# Patient Record
Sex: FEMALE | Race: WHITE | NOT HISPANIC OR LATINO | Employment: FULL TIME | URBAN - METROPOLITAN AREA
[De-identification: names, ages, dates, MRNs, and addresses within clinical notes are randomized per-mention and may not be internally consistent; named-entity substitution may affect disease eponyms.]

---

## 2017-01-26 ENCOUNTER — OFFICE VISIT (OUTPATIENT)
Dept: OBSTETRICS AND GYNECOLOGY | Facility: CLINIC | Age: 32
End: 2017-01-26
Attending: OBSTETRICS & GYNECOLOGY
Payer: COMMERCIAL

## 2017-01-26 ENCOUNTER — TELEPHONE (OUTPATIENT)
Dept: OBSTETRICS AND GYNECOLOGY | Facility: CLINIC | Age: 32
End: 2017-01-26

## 2017-01-26 VITALS
WEIGHT: 147.69 LBS | DIASTOLIC BLOOD PRESSURE: 78 MMHG | BODY MASS INDEX: 23.84 KG/M2 | HEART RATE: 73 BPM | SYSTOLIC BLOOD PRESSURE: 120 MMHG

## 2017-01-26 DIAGNOSIS — Z01.419 WELL FEMALE EXAM WITH ROUTINE GYNECOLOGICAL EXAM: Primary | ICD-10-CM

## 2017-01-26 PROBLEM — K52.9 COLITIS: Status: ACTIVE | Noted: 2017-01-26

## 2017-01-26 PROCEDURE — 88175 CYTOPATH C/V AUTO FLUID REDO: CPT

## 2017-01-26 PROCEDURE — 99999 PR PBB SHADOW E&M-EST. PATIENT-LVL III: CPT | Mod: PBBFAC,,, | Performed by: OBSTETRICS & GYNECOLOGY

## 2017-01-26 PROCEDURE — 99385 PREV VISIT NEW AGE 18-39: CPT | Mod: S$GLB,,, | Performed by: OBSTETRICS & GYNECOLOGY

## 2017-01-26 RX ORDER — AMOXICILLIN 500 MG
2 CAPSULE ORAL DAILY
COMMUNITY

## 2017-01-26 RX ORDER — NORETHINDRONE ACETATE AND ETHINYL ESTRADIOL AND FERROUS FUMARATE 1MG-20(24)
1 KIT ORAL DAILY
Qty: 28 TABLET | Refills: 11 | Status: SHIPPED | OUTPATIENT
Start: 2017-01-26 | End: 2018-11-16

## 2017-01-26 RX ORDER — LANOLIN ALCOHOL/MO/W.PET/CERES
100 CREAM (GRAM) TOPICAL DAILY
COMMUNITY

## 2017-01-26 NOTE — PROGRESS NOTES
SUBJECTIVE:   31 y.o. female No obstetric history on file.  for routine gyn exam. Patient's last menstrual period was 01/25/2017 (approximate)..  She has no unusual complaints. Desires OCP's. Discussed colitis and pregnancy issues.          Past Medical History   Diagnosis Date    Crohn disease     Ulcerative colitis, unspecified      Past Surgical History   Procedure Laterality Date    Appendectomy      Foot surgery Right     Ear tube removal Bilateral     [place] tooth extraction       Social History     Social History    Marital status: Single     Spouse name: N/A    Number of children: N/A    Years of education: N/A     Occupational History    Not on file.     Social History Main Topics    Smoking status: Never Smoker    Smokeless tobacco: Not on file    Alcohol use Yes      Comment: socially    Drug use: No    Sexual activity: Yes     Partners: Male     Birth control/ protection: OCP     Other Topics Concern    Not on file     Social History Narrative     History reviewed. No pertinent family history.  OB History   No data available         Current Outpatient Prescriptions   Medication Sig Dispense Refill    cyanocobalamin (VITAMIN B-12) 1000 MCG tablet Take 100 mcg by mouth once daily.      fish oil-omega-3 fatty acids 300-1,000 mg capsule Take 2 g by mouth once daily.      mercaptopurine (PURINETHOL) 50 mg tablet Take 50 mg by mouth once daily.      mesalamine (ASACOL) 400 mg EC tablet Take 400 mg by mouth 3 (three) times daily.      multivitamin capsule Take 1 capsule by mouth once daily.      turmeric, bulk, 95 % Powd by Misc.(Non-Drug; Combo Route) route.      norethindrone-e.estradiol-iron (MINASTRIN 24 FE) 1 mg-20 mcg(24) /75 mg (4) Chew Take 1 tablet by mouth once daily. 28 tablet 11     No current facility-administered medications for this visit.      Allergies: Cimzia [certolizumab pegol]     ROS:  Constitutional: no weight loss, weight gain, fever, fatigue  Eyes:  No vision  changes, glasses/contacts  ENT/Mouth: No ulcers, sinus problems, ears ringing, headache  Cardiovascular: No inability to lie flat, chest pain, exercise intolerance, swelling, heart palpitations  Respiratory: No wheezing, coughing blood, shortness of breath, or cough  Gastrointestinal: No diarrhea, bloody stool, nausea/vomiting, constipation, gas, hemorrhoids  Genitourinary: No blood in urine, painful urination, urgency of urination, frequency of urination, incomplete emptying, incontinence, abnormal bleeding, painful periods, heavy periods, vaginal discharge, vaginal odor, painful intercourse, sexual problems, bleeding after intercourse.  Musculoskeletal: No muscle weakness  Skin/Breast: No painful breasts, nipple discharge, masses, rash, ulcers  Neurological: No passing out, seizures, numbness, headache  Endocrine: No diabetes, hypothyroid, hyperthyroid, hot flashes, hair loss, abnormal hair growth, ance  Psychiatric: No depression, crying  Hematologic: No bruises, bleeding, swollen lymph nodes, anemia.      OBJECTIVE:   The patient appears well, alert, oriented x 3, in no distress.  Visit Vitals    /78 (BP Location: Right arm, Patient Position: Sitting, BP Method: Manual)    Pulse 73    Wt 67 kg (147 lb 11.3 oz)    LMP 01/25/2017 (Approximate)    BMI 23.84 kg/m2     NECK: no thyromegaly, trachea midline  SKIN: no acne, striae, hirsutism  CHEST: CTAB  CV: RRR  BREAST EXAM: breasts appear normal, no suspicious masses, no skin or nipple changes or axillary nodes  ABDOMEN: no hernias, masses, or hepatosplenomegaly  GENITALIA: normal external genitalia, no erythema, no discharge  URETHRA: normal urethra, normal urethral meatus  VAGINA: Normal  CERVIX: no lesions or cervical motion tenderness  UTERUS: normal size, contour, position, consistency, mobility, non-tender  ADNEXA: no mass, fullness, tenderness      ASSESSMENT:   1. Well female exam with routine gynecological exam  Liquid-based pap smear, screening        PLAN:   Orders Placed This Encounter    Liquid-based pap smear, screening    norethindrone-e.estradiol-iron (MINASTRIN 24 FE) 1 mg-20 mcg(24) /75 mg (4) Chew     Return to clinic in 1 year

## 2017-01-26 NOTE — MR AVS SNAPSHOT
[facility] - [facility]N [address]  [address] [address]  Phone: [phone]                  Alisa Herrera   2017 3:00 PM   Office Visit    Description:  Female : 1985   Provider:  Juanis Mccarthy MD   Department:  [facility] - OB[facility]N [address]           Reason for Visit     Well Woman                To Do List           Goals (5 Years of Data)     None      [facility]     [facility] Nurse Care Line -  Assistance  Registered nurses in the [facility] provide clinical advisement, health education, appointment booking, and other advisory services.  Call for this free service at [phone].             Medications           Message regarding Medications     Verify the changes and/or additions to your medication regime listed below are the same as discussed with your clinician today.  If any of these changes or additions are incorrect, please notify your healthcare provider.             Verify that the below list of medications is an accurate representation of the medications you are currently taking.  If none reported, the list may be blank. If incorrect, please contact your healthcare provider. Carry this list with you in case of emergency.           Current Medications     fish oil-omega-3 fatty acids 300-1,000 mg capsule Take 2 g by mouth once daily.    mercaptopurine (PURINETHOL) 50 mg tablet Take 50 mg by mouth once daily.    mesalamine (ASACOL) 400 mg EC tablet Take 400 mg by mouth 3 (three) times daily.    multivitamin capsule Take 1 capsule by mouth once daily.    turmeric, bulk, 95 % Powd by Misc.(Non-Drug; Combo Route) route.           Clinical Reference Information           Vital Signs - Last Recorded  Most recent update: 2017  3:33 PM by Liz Franz MA    BP Pulse Wt LMP BMI    120/78 (BP Location: Right arm, Patient Position: Sitting, BP Method: Manual) 73 67 kg (147 lb 11.3 oz) 2017 (Approximate) 23.84 kg/m2       Blood Pressure          Most Recent Value    BP  120/78      Allergies as of 1/26/2017     Cimzia [Certolizumab Pegol]      Immunizations Administered on Date of Encounter - 1/26/2017     None      [facility] Sign-Up     Activating your [facility] account is as easy as 1-2-3!     1) Visit [email or web address], select Sign Up Now, enter this activation code and your date of birth, then select Next.  [email or web address]  Expires: 3/12/2017  3:34 PM      2) Create a username and password to use when you visit [facility] in the future and select a security question in case you lose your password and select Next.    3) Enter your e-mail address and click Sign Up!    Additional Information  If you have questions, please e-mail [email or web address] or call [phone] to talk to our [facility] staff. Remember, [facility] is NOT to be used for urgent needs. For medical emergencies, dial 911.

## 2017-01-26 NOTE — LETTER
January 26, 2017      Abbey Fuentes MD  [address]  [address]  [place] [address]           [facility] [address]  [address] [address]  Phone: [phone]          Patient: Alisa Herrera   MR Number: [id number]   YOB: 1985   Date of Visit: 1/26/2017       Dear Dr. Abbey Fuentes:    Thank you for referring Alisa Herrera to me for evaluation. Attached you will find relevant portions of my assessment and plan of care.    If you have questions, please do not hesitate to call me. I look forward to following Alisa Herrera along with you.    Sincerely,    Juanis Mccarthy MD    Enclosure  CC:  No Recipients    If you would like to receive this communication electronically, please contact [email or web address] or [phone] to request more information on [employer] access.    For providers and/or their staff who would like to refer a patient to [facility], please contact us through our one-stop-shop provider referral line, [facility], at [phone].    If you feel you have received this communication in error or would no longer like to receive these types of communications, please e-mail [email or web address]

## 2017-01-26 NOTE — TELEPHONE ENCOUNTER
----- Message from Kalli Albright sent at 1/26/2017  8:10 AM CST -----  Contact: RAFA MESSINA [[id number]]  x_  1st Request  _  2nd Request  _  3rd Request        Who: RAFA MESSINA [[id number]]    Why: Pt states she has started her period and would like to know if she can still be seen today for her annual. Patient states to please leave a voicemail because she may not be able to answer her phone.    What Number to Call Back: [phone]    When to Expect a call back: (Before the end of the day)   -- if call after 3:00 call back will be tomorrow.

## 2017-12-11 ENCOUNTER — OFFICE VISIT (OUTPATIENT)
Dept: OBSTETRICS AND GYNECOLOGY | Facility: CLINIC | Age: 32
End: 2017-12-11
Payer: COMMERCIAL

## 2017-12-11 VITALS
WEIGHT: 148.38 LBS | DIASTOLIC BLOOD PRESSURE: 68 MMHG | HEIGHT: 66 IN | SYSTOLIC BLOOD PRESSURE: 112 MMHG | BODY MASS INDEX: 23.84 KG/M2

## 2017-12-11 DIAGNOSIS — Z31.69 ENCOUNTER FOR PRECONCEPTION CONSULTATION: ICD-10-CM

## 2017-12-11 DIAGNOSIS — K51.00 ULCERATIVE PANCOLITIS WITHOUT COMPLICATION: Primary | ICD-10-CM

## 2017-12-11 PROCEDURE — 99213 OFFICE O/P EST LOW 20 MIN: CPT | Mod: S$GLB,,, | Performed by: OBSTETRICS & GYNECOLOGY

## 2017-12-11 PROCEDURE — 99999 PR PBB SHADOW E&M-EST. PATIENT-LVL III: CPT | Mod: PBBFAC,,, | Performed by: OBSTETRICS & GYNECOLOGY

## 2017-12-11 RX ORDER — MESALAMINE 800 MG/1
1600 TABLET, DELAYED RELEASE ORAL 2 TIMES DAILY
Qty: 360 TABLET | Refills: 3
Start: 2017-12-11 | End: 2018-12-18

## 2017-12-11 NOTE — PROGRESS NOTES
HPI: 32 year old female presents today for preconception counseling. She has hx of ulcerative colitis which has been well controlled for the past 3 years. Is on mesalamine. Was on mercaptopurine which was stopped by her GI doctor in [place] as she will be attempting pregnancy (Cat D). She wants to see how she does with just the mesalamine alone. Pt plans to stop OCP in February 2018 if UC is still well controlled on mesalamine alone.    Pt is a dietician.      ROS:  GENERAL: Feeling well overall. Denies fever or chills.   SKIN: Denies rash or lesions.   HEAD: Denies head injury or headache.   NODES: Denies enlarged lymph nodes.   CHEST: Denies chest pain or shortness of breath.   CARDIOVASCULAR: Denies palpitations or left sided chest pain.   ABDOMEN: No abdominal pain, constipation, diarrhea, nausea, vomiting or rectal bleeding.   URINARY: No dysuria, hematuria, or burning on urination.  REPRODUCTIVE: See HPI.   BREASTS: Denies pain, lumps, or nipple discharge.   HEMATOLOGIC: No easy bruisability or excessive bleeding.   MUSCULOSKELETAL: Denies joint pain or swelling.   NEUROLOGIC: Denies syncope or weakness.   PSYCHIATRIC: Denies depression, anxiety or mood swings.    PE:   APPEARANCE: Well nourished, well developed, White female in no acute distress.  PELVIC: deferred.     Diagnosis:  1. Ulcerative pancolitis without complication    2. Encounter for preconception consultation        Plan:     Orders Placed This Encounter    mesalamine (ASACOL HD) 800 mg TbEC     - Increase folic acid to 4 mg daily  - continue with current adult multi vitamin      Follow-up with me when (+) UPT

## 2018-03-06 ENCOUNTER — CLINICAL SUPPORT (OUTPATIENT)
Dept: OCCUPATIONAL MEDICINE | Facility: CLINIC | Age: 33
End: 2018-03-06

## 2018-03-06 DIAGNOSIS — Z11.1 PPD SCREENING TEST: Primary | ICD-10-CM

## 2018-03-06 PROCEDURE — 86580 TB INTRADERMAL TEST: CPT | Mod: S$GLB,,,

## 2018-03-08 LAB
TB INDURATION - 48 HR READ: 0 MM
TB INDURATION - 72 HR READ: NORMAL MM
TB SKIN TEST - 48 HR READ: NEGATIVE
TB SKIN TEST - 72 HR READ: NORMAL

## 2018-03-12 RX ORDER — NORETHINDRONE ACETATE, ETHINYL ESTRADIOL AND FERROUS FUMARATE 1MG-20(24)
KIT ORAL
Qty: 28 TABLET | Refills: 0 | OUTPATIENT
Start: 2018-03-12

## 2018-07-23 ENCOUNTER — HOSPITAL ENCOUNTER (OUTPATIENT)
Dept: RADIOLOGY | Facility: OTHER | Age: 33
Discharge: HOME | End: 2018-07-23
Attending: OBSTETRICS & GYNECOLOGY
Payer: COMMERCIAL

## 2018-07-23 ENCOUNTER — NURSE TRIAGE (OUTPATIENT)
Dept: ADMINISTRATIVE | Facility: CLINIC | Age: 33
End: 2018-07-23

## 2018-07-23 ENCOUNTER — TELEPHONE (OUTPATIENT)
Dept: OBSTETRICS AND GYNECOLOGY | Facility: CLINIC | Age: 33
End: 2018-07-23

## 2018-07-23 ENCOUNTER — HOSPITAL ENCOUNTER (EMERGENCY)
Facility: OTHER | Age: 33
Discharge: HOME | End: 2018-07-23
Attending: EMERGENCY MEDICINE
Payer: COMMERCIAL

## 2018-07-23 VITALS
TEMPERATURE: 98 F | OXYGEN SATURATION: 98 % | DIASTOLIC BLOOD PRESSURE: 64 MMHG | WEIGHT: 150 LBS | SYSTOLIC BLOOD PRESSURE: 118 MMHG | HEIGHT: 66 IN | HEART RATE: 84 BPM | RESPIRATION RATE: 18 BRPM | BODY MASS INDEX: 24.11 KG/M2

## 2018-07-23 DIAGNOSIS — R10.2 PELVIC PAIN: Primary | ICD-10-CM

## 2018-07-23 DIAGNOSIS — R10.2 PELVIC PAIN: ICD-10-CM

## 2018-07-23 DIAGNOSIS — K57.32 SIGMOID DIVERTICULITIS: Primary | ICD-10-CM

## 2018-07-23 LAB
ALBUMIN SERPL BCP-MCNC: 3.9 G/DL
ALP SERPL-CCNC: 79 U/L
ALT SERPL W/O P-5'-P-CCNC: 18 U/L
ANION GAP SERPL CALC-SCNC: 10 MMOL/L
AST SERPL-CCNC: 19 U/L
B-HCG UR QL: NEGATIVE
BASOPHILS # BLD AUTO: 0.01 K/UL
BASOPHILS NFR BLD: 0.1 %
BILIRUB SERPL-MCNC: 2.1 MG/DL
BILIRUB UR QL STRIP: NEGATIVE
BUN SERPL-MCNC: 6 MG/DL
CALCIUM SERPL-MCNC: 10.1 MG/DL
CHLORIDE SERPL-SCNC: 102 MMOL/L
CLARITY UR: CLEAR
CO2 SERPL-SCNC: 25 MMOL/L
COLOR UR: YELLOW
CREAT SERPL-MCNC: 0.8 MG/DL
CTP QC/QA: YES
DIFFERENTIAL METHOD: ABNORMAL
EOSINOPHIL # BLD AUTO: 0 K/UL
EOSINOPHIL NFR BLD: 0.1 %
ERYTHROCYTE [DISTWIDTH] IN BLOOD BY AUTOMATED COUNT: 12.6 %
EST. GFR  (AFRICAN AMERICAN): >60 ML/MIN/1.73 M^2
EST. GFR  (NON AFRICAN AMERICAN): >60 ML/MIN/1.73 M^2
GLUCOSE SERPL-MCNC: 107 MG/DL
GLUCOSE UR QL STRIP: NEGATIVE
HCT VFR BLD AUTO: 38.2 %
HGB BLD-MCNC: 12.9 G/DL
HGB UR QL STRIP: NEGATIVE
KETONES UR QL STRIP: NEGATIVE
LEUKOCYTE ESTERASE UR QL STRIP: NEGATIVE
LIPASE SERPL-CCNC: 11 U/L
LYMPHOCYTES # BLD AUTO: 1.5 K/UL
LYMPHOCYTES NFR BLD: 9.9 %
MCH RBC QN AUTO: 32 PG
MCHC RBC AUTO-ENTMCNC: 33.8 G/DL
MCV RBC AUTO: 95 FL
MONOCYTES # BLD AUTO: 1 K/UL
MONOCYTES NFR BLD: 6.6 %
NEUTROPHILS # BLD AUTO: 12.6 K/UL
NEUTROPHILS NFR BLD: 82.9 %
NITRITE UR QL STRIP: NEGATIVE
PH UR STRIP: 6 [PH] (ref 5–8)
PLATELET # BLD AUTO: 300 K/UL
PMV BLD AUTO: 9.6 FL
POTASSIUM SERPL-SCNC: 4 MMOL/L
PROT SERPL-MCNC: 8.8 G/DL
PROT UR QL STRIP: NEGATIVE
RBC # BLD AUTO: 4.03 M/UL
SODIUM SERPL-SCNC: 137 MMOL/L
SP GR UR STRIP: 1.01 (ref 1–1.03)
URN SPEC COLLECT METH UR: NORMAL
UROBILINOGEN UR STRIP-ACNC: NEGATIVE EU/DL
WBC # BLD AUTO: 15.17 K/UL

## 2018-07-23 PROCEDURE — 96375 TX/PRO/DX INJ NEW DRUG ADDON: CPT

## 2018-07-23 PROCEDURE — 76830 TRANSVAGINAL US NON-OB: CPT | Mod: 26,,, | Performed by: RADIOLOGY

## 2018-07-23 PROCEDURE — 96361 HYDRATE IV INFUSION ADD-ON: CPT

## 2018-07-23 PROCEDURE — 76856 US EXAM PELVIC COMPLETE: CPT | Mod: 26,,, | Performed by: RADIOLOGY

## 2018-07-23 PROCEDURE — 96374 THER/PROPH/DIAG INJ IV PUSH: CPT

## 2018-07-23 PROCEDURE — 76830 TRANSVAGINAL US NON-OB: CPT | Mod: TC

## 2018-07-23 PROCEDURE — 63600175 PHARM REV CODE 636 W HCPCS: Performed by: PHYSICIAN ASSISTANT

## 2018-07-23 PROCEDURE — 81025 URINE PREGNANCY TEST: CPT | Performed by: EMERGENCY MEDICINE

## 2018-07-23 PROCEDURE — 83690 ASSAY OF LIPASE: CPT

## 2018-07-23 PROCEDURE — 85025 COMPLETE CBC W/AUTO DIFF WBC: CPT

## 2018-07-23 PROCEDURE — 25000003 PHARM REV CODE 250: Performed by: PHYSICIAN ASSISTANT

## 2018-07-23 PROCEDURE — 80053 COMPREHEN METABOLIC PANEL: CPT

## 2018-07-23 PROCEDURE — 81003 URINALYSIS AUTO W/O SCOPE: CPT

## 2018-07-23 PROCEDURE — 25500020 PHARM REV CODE 255: Performed by: EMERGENCY MEDICINE

## 2018-07-23 PROCEDURE — 99284 EMERGENCY DEPT VISIT MOD MDM: CPT | Mod: 25

## 2018-07-23 RX ORDER — ONDANSETRON 4 MG/1
4 TABLET, ORALLY DISINTEGRATING ORAL EVERY 6 HOURS PRN
Qty: 20 TABLET | Refills: 0 | Status: SHIPPED | OUTPATIENT
Start: 2018-07-23 | End: 2022-04-08

## 2018-07-23 RX ORDER — METRONIDAZOLE 500 MG/1
500 TABLET ORAL
Status: COMPLETED | OUTPATIENT
Start: 2018-07-23 | End: 2018-07-23

## 2018-07-23 RX ORDER — METRONIDAZOLE 500 MG/1
500 TABLET ORAL 2 TIMES DAILY
Qty: 20 TABLET | Refills: 0 | Status: SHIPPED | OUTPATIENT
Start: 2018-07-23 | End: 2018-08-02

## 2018-07-23 RX ORDER — ONDANSETRON 2 MG/ML
4 INJECTION INTRAMUSCULAR; INTRAVENOUS
Status: COMPLETED | OUTPATIENT
Start: 2018-07-23 | End: 2018-07-23

## 2018-07-23 RX ORDER — CIPROFLOXACIN 500 MG/1
500 TABLET ORAL 2 TIMES DAILY
Qty: 20 TABLET | Refills: 0 | Status: SHIPPED | OUTPATIENT
Start: 2018-07-23 | End: 2018-08-02

## 2018-07-23 RX ORDER — MORPHINE SULFATE 10 MG/ML
4 INJECTION INTRAMUSCULAR; INTRAVENOUS; SUBCUTANEOUS
Status: COMPLETED | OUTPATIENT
Start: 2018-07-23 | End: 2018-07-23

## 2018-07-23 RX ORDER — HYDROCODONE BITARTRATE AND ACETAMINOPHEN 5; 325 MG/1; MG/1
1 TABLET ORAL EVERY 6 HOURS PRN
Qty: 12 TABLET | Refills: 0 | Status: SHIPPED | OUTPATIENT
Start: 2018-07-23 | End: 2018-08-02

## 2018-07-23 RX ORDER — CIPROFLOXACIN 500 MG/1
500 TABLET ORAL
Status: COMPLETED | OUTPATIENT
Start: 2018-07-23 | End: 2018-07-23

## 2018-07-23 RX ADMIN — ONDANSETRON HYDROCHLORIDE 4 MG: 2 INJECTION, SOLUTION INTRAMUSCULAR; INTRAVENOUS at 07:07

## 2018-07-23 RX ADMIN — MORPHINE SULFATE 4 MG: 10 INJECTION INTRAVENOUS at 07:07

## 2018-07-23 RX ADMIN — IOHEXOL 75 ML: 350 INJECTION, SOLUTION INTRAVENOUS at 08:07

## 2018-07-23 RX ADMIN — CIPROFLOXACIN HYDROCHLORIDE 500 MG: 500 TABLET, FILM COATED ORAL at 09:07

## 2018-07-23 RX ADMIN — SODIUM CHLORIDE 1000 ML: 0.9 INJECTION, SOLUTION INTRAVENOUS at 07:07

## 2018-07-23 RX ADMIN — METRONIDAZOLE 500 MG: 500 TABLET ORAL at 09:07

## 2018-07-23 NOTE — TELEPHONE ENCOUNTER
----- Message from Shari Ibarra sent at 7/23/2018  9:12 AM CDT -----  Contact: pt            Name of Who is Calling: RAFA GERARD [[id number]]      What is the request in detail:pt has been experiencing cramping, gas and pelvic pain.. Pt is not sure if its GI related.. Pt cycle was suppose to start this pass Friday.. Please advise      Can the clinic reply by [facility]: no      What Number to Call Back if not in [facility]: [phone]

## 2018-07-23 NOTE — TELEPHONE ENCOUNTER
----- Message from Robel Reno sent at 7/23/2018  4:06 PM CDT -----  Contact: self  Pt wanting to know results of her US. She can be reached at [phone]

## 2018-07-23 NOTE — TELEPHONE ENCOUNTER
"  Reason for Disposition   General information question, no triage required and triager able to answer question    Answer Assessment - Initial Assessment Questions  1. REASON FOR CALL or QUESTION: "What is your reason for calling today?" or "How can I best help you?" or "What question do you have that I can help answer?"      Wants to know what portion of an abdominal CT scan would be covered by her insurance if she came in to the ER.  She has an appt with her pcp  In the am, and is sure she will order a CT, and she thinks she'd just rather come in to the ED because she is having significant abdominal pain.    Protocols used: ST INFORMATION ONLY CALL-A-AH    "

## 2018-07-24 NOTE — ED TRIAGE NOTES
"+ sharp pelvic pain since Saturday that radiates up to the abdomen. Pt states that she " had an ultra sound today with her OBGYN and they didn't see anything on their end so I came here because I have to get to the bottom of it". Pt c/o nausea and chills but denies fever, dysuria, sob, cp  "

## 2018-07-24 NOTE — ED PROVIDER NOTES
"Encounter Date: 7/23/2018       History     Chief Complaint   Patient presents with    Abdominal Pain     "I've been having really bad abd pain and bloating since Saturday night. I was supposed to start my cycle Thursday but it did not." reports negative home UPT and US done here today; h/o Chron's and colitis     33-year-old female with history of Crohn's/colitis presents to the emergency department with complaints of abdominal pain and bloating.  She states it initially started on Saturday however worsened.  She reports that she is having abdominal cramps that is more to the lower part of her abdomen.  She reports that she had a soft bowel movement yesterday which she reports is her baseline.  She reports nausea but denies vomiting.  She denies constipation.  She denies any abnormal vaginal discharge. She does report that she was supposed to start her menstrual cycle on Thursday however did not.  She reports associated chills but denies fever.  She is treated the pain with both ibuprofen and Tylenol without significant improvement.  She did report temporary relief.  She states the pain is 7/10.      The history is provided by the patient.     Review of patient's allergies indicates:   Allergen Reactions    Cimzia [certolizumab pegol]      Lupus like reactions     Past Medical History:   Diagnosis Date    Crohn disease     Ulcerative colitis, unspecified      Past Surgical History:   Procedure Laterality Date    APPENDECTOMY      EAR TUBE REMOVAL Bilateral     FOOT SURGERY Right     WISDOM TOOTH EXTRACTION       History reviewed. No pertinent family history.  Social History   Substance Use Topics    Smoking status: Never Smoker    Smokeless tobacco: Not on file    Alcohol use Yes      Comment: socially     Review of Systems   Constitutional: Negative for chills and fever.   HENT: Negative for sore throat.    Respiratory: Negative for shortness of breath.    Cardiovascular: Negative for chest pain. "   Gastrointestinal: Positive for abdominal pain and nausea. Negative for anal bleeding, constipation, diarrhea and vomiting.   Genitourinary: Negative for difficulty urinating, dysuria, flank pain, frequency, hematuria, urgency, vaginal bleeding and vaginal discharge.   Musculoskeletal: Negative for back pain.   Skin: Negative for rash.   Neurological: Negative for weakness.   Hematological: Does not bruise/bleed easily.       Physical Exam     Initial Vitals [07/23/18 1832]   BP Pulse Resp Temp SpO2   119/72 107 16 98.3 °F (36.8 °C) 99 %      MAP       --         Physical Exam    Nursing note and vitals reviewed.  Constitutional: Vital signs are normal. She appears well-developed and well-nourished. She is not diaphoretic.  Non-toxic appearance. No distress.   HENT:   Head: Normocephalic and atraumatic.   Right Ear: External ear normal.   Left Ear: External ear normal.   Nose: Nose normal.   Mouth/Throat: Oropharynx is clear and moist.   Eyes: Conjunctivae, EOM and lids are normal. Pupils are equal, round, and reactive to light. No scleral icterus.   Neck: Normal range of motion and phonation normal. Neck supple.   Cardiovascular: Normal rate, regular rhythm and normal heart sounds. Exam reveals no gallop and no friction rub.    No murmur heard.  Pulmonary/Chest: No respiratory distress. She has no wheezes. She has no rhonchi. She has no rales.   Abdominal: Soft. Normal appearance and bowel sounds are normal. She exhibits no distension. There is tenderness in the suprapubic area and left lower quadrant. There is no rigidity, no rebound, no guarding, no CVA tenderness, no tenderness at McBurney's point and negative Dee's sign.   Musculoskeletal: Normal range of motion.   No obvious deformities, moving all extremities, normal gait   Neurological: She is alert and oriented to person, place, and time. She has normal strength. No sensory deficit.   Skin: Skin is warm, dry and intact. No lesion and no rash noted. No  erythema.   Psychiatric: She has a normal mood and affect. Her speech is normal and behavior is normal. Judgment normal. Cognition and memory are normal.         ED Course   Procedures  Labs Reviewed   CBC W/ AUTO DIFFERENTIAL - Abnormal; Notable for the following:        Result Value    WBC 15.17 (*)     MCH 32.0 (*)     Gran # (ANC) 12.6 (*)     Gran% 82.9 (*)     Lymph% 9.9 (*)     All other components within normal limits   COMPREHENSIVE METABOLIC PANEL - Abnormal; Notable for the following:     Total Protein 8.8 (*)     Total Bilirubin 2.1 (*)     All other components within normal limits   URINALYSIS, REFLEX TO URINE CULTURE    Narrative:     Preferred Collection Type->Urine, Clean Catch   LIPASE   POCT URINE PREGNANCY          Imaging Results          CT Abdomen Pelvis With Contrast (Final result)  Result time 07/23/18 20:47:05    Final result by Lito Ambriz MD (07/23/18 20:47:05)                 Impression:      Acute sigmoid diverticulitis.  No abscess.    Prior appendectomy.    Layering sludge in the gallbladder.    Small bilateral adnexal cyst.  Minimal free fluid in the cul de sac.      Electronically signed by: Lito Ambriz MD  Date:    07/23/2018  Time:    20:47             Narrative:    EXAMINATION:  CT ABDOMEN PELVIS WITH CONTRAST    CLINICAL HISTORY:  Abdominal pain, unspecified;    TECHNIQUE:  Low dose axial images, sagittal and coronal reformations were obtained from the lung bases to the pubic symphysis following the IV administration of 75 mL of Omnipaque 350 .  Oral contrast was not administered.    COMPARISON:  July 9, 2014.    FINDINGS:  ABDOMEN:    - Lung bases: No infiltrates and no nodules.    - Liver: No focal mass.    - Gallbladder: Layering sludge.    - Bile Ducts: No evidence of intra or extra hepatic biliary ductal dilation.    - Spleen: Negative.    - Kidneys: No mass or hydronephrosis.    - Adrenals: Unremarkable.    - Pancreas: No mass or peripancreatic fat  stranding.    - Retroperitoneum:  No significant adenopathy.    - Vascular: No abdominal aortic aneurysm.    - Abdominal wall:  Unremarkable.    PELVIS:    No pelvic mass or adenopathy.  Minimal free fluid in the posterior cul-de-sac.  Subcentimeter enhancing follicular cyst left ovary.  2 cm cyst right ovary    BOWEL/MESENTERY:    Fell bowel obstruction.  Sigmoid diverticula with segment of abnormal colon wall thickening and pericolic fat stranding consistent with acute diverticulitis.  No pericolic abscess.  1 cm fecalith  at the cecal base.  Radiopaque suture line at the base of the cecum consistent with history of prior appendectomy.    BONES:  No acute osseous abnormality and no suspicious lytic or blastic lesion.                                 Medical Decision Making:   History:   Old Medical Records: I decided to obtain old medical records.  Initial Assessment:   33-year-old female with complaints consistent with sigmoid diverticulitis.  Afebrile neurovascularly intact.  She is alert and healthy and nontoxic appearing.  Exam documented above.  She is tender in the left lower quadrant suprapubic region.  She appears uncomfortable.  No CVA tenderness palpation.  Patient had ultrasound obtained by gyn with no acute findings to explain her symptoms today.  Clinical Tests:   Lab Tests: Ordered and Reviewed  Radiological Study: Ordered and Reviewed  ED Management:  Urine, labs and CT scan with IV contrast was obtained.  Her [place] blood [place] [address].  H&H stable. No significant electrolyte abnormality.  UPT negative. Urinalysis shows no evidence of serious bacterial infection, UTI or pyelonephritis.  Lipase is not elevated.  A CT scan concerning for sigmoid diverticulitis without abscess formation.  She was administered IV fluids, Zofran, morphine in the emergency department.  She was then given a dose of Cipro and Flagyl.  Given strict return precautions, otherwise follow up with her  gastroenterologist.  She admits that her physician is in bed roof however she did call her today.  Will discharge home with prescription for Cipro, Flagyl, Zofran ODT and Norco.  She is to follow up in the next 48 hr or return for any worsening signs or symptoms. She states understanding agrees with this plan.  This patient was discussed with the attending physician who agrees with treatment plan.  This is the extent of patient's complaints today.  Other:   I have discussed this case with another health care provider.       <> Summary of the Discussion: Jack  This note was created using [employer] dictation.  There may be typographical errors secondary to dictation.                        Clinical Impression:     1. Sigmoid diverticulitis                                   Shea Alvarez PA-C  07/23/18 [id number]

## 2018-07-24 NOTE — ED NOTES
Rounding on the patient is completed. AAOx4.  Patient is sitting in recliner, respirations are even and unlabored. Patient skin is warm and dry. Patient in no acute distress. The patient has been updated on plan of care and current status. Pain was accessed and is currently a 4/10. Pt denies n/v, SOB, dizziness or lightheadedness. Comfort postioning and restroom needs were addressed. Pt's significant other is at the bedside. The call bell remains at bedside. Will continue to monitor patient.

## 2018-11-16 ENCOUNTER — OFFICE VISIT (OUTPATIENT)
Dept: OBSTETRICS AND GYNECOLOGY | Facility: CLINIC | Age: 33
End: 2018-11-16
Payer: COMMERCIAL

## 2018-11-16 ENCOUNTER — LAB VISIT (OUTPATIENT)
Dept: LAB | Facility: HOSPITAL | Age: 33
End: 2018-11-16
Attending: OBSTETRICS & GYNECOLOGY
Payer: COMMERCIAL

## 2018-11-16 ENCOUNTER — TELEPHONE (OUTPATIENT)
Dept: OBSTETRICS AND GYNECOLOGY | Facility: CLINIC | Age: 33
End: 2018-11-16

## 2018-11-16 VITALS
DIASTOLIC BLOOD PRESSURE: 64 MMHG | BODY MASS INDEX: 23.77 KG/M2 | WEIGHT: 147.94 LBS | HEIGHT: 66 IN | SYSTOLIC BLOOD PRESSURE: 112 MMHG

## 2018-11-16 DIAGNOSIS — N91.2 AMENORRHEA: Primary | ICD-10-CM

## 2018-11-16 DIAGNOSIS — Z11.3 SCREEN FOR STD (SEXUALLY TRANSMITTED DISEASE): ICD-10-CM

## 2018-11-16 DIAGNOSIS — Z01.419 ENCOUNTER FOR GYNECOLOGICAL EXAMINATION: Primary | ICD-10-CM

## 2018-11-16 DIAGNOSIS — N91.2 AMENORRHEA: ICD-10-CM

## 2018-11-16 DIAGNOSIS — Z12.4 PAP SMEAR FOR CERVICAL CANCER SCREENING: ICD-10-CM

## 2018-11-16 LAB
HCG INTACT+B SERPL-ACNC: 39 MIU/ML
PROGEST SERPL-MCNC: 8.5 NG/ML

## 2018-11-16 PROCEDURE — 99395 PREV VISIT EST AGE 18-39: CPT | Mod: S$GLB,,, | Performed by: OBSTETRICS & GYNECOLOGY

## 2018-11-16 PROCEDURE — 99999 PR PBB SHADOW E&M-EST. PATIENT-LVL III: CPT | Mod: PBBFAC,,, | Performed by: OBSTETRICS & GYNECOLOGY

## 2018-11-16 PROCEDURE — 36415 COLL VENOUS BLD VENIPUNCTURE: CPT | Mod: PO

## 2018-11-16 PROCEDURE — 84144 ASSAY OF PROGESTERONE: CPT

## 2018-11-16 PROCEDURE — 87491 CHLMYD TRACH DNA AMP PROBE: CPT

## 2018-11-16 PROCEDURE — 88175 CYTOPATH C/V AUTO FLUID REDO: CPT

## 2018-11-16 PROCEDURE — 84702 CHORIONIC GONADOTROPIN TEST: CPT

## 2018-11-16 RX ORDER — CYCLOBENZAPRINE HCL 10 MG
TABLET ORAL
Refills: 0 | COMMUNITY
Start: 2018-11-11 | End: 2019-09-19

## 2018-11-16 RX ORDER — SODIUM PICOSULFATE, MAGNESIUM OXIDE, AND ANHYDROUS CITRIC ACID 10; 3.5; 12 MG/160ML; G/160ML; G/160ML
LIQUID ORAL
Refills: 0 | COMMUNITY
Start: 2018-08-09 | End: 2019-09-19

## 2018-11-16 NOTE — PROGRESS NOTES
HPI: Pt is a 33 y.o.  female who presents for routine annual exam. She reports she has not had a cycle in 6 weeks. She has been attempting pregnancy. No other complaints.      is  at [employer].     ROS:  GENERAL: Feeling well overall. Denies fever or chills.   SKIN: Denies rash or lesions.   HEAD: Denies head injury or headache.   NODES: Denies enlarged lymph nodes.   CHEST: Denies chest pain or shortness of breath.   CARDIOVASCULAR: Denies palpitations or left sided chest pain.   ABDOMEN: No abdominal pain, constipation, diarrhea, nausea, vomiting or rectal bleeding.   URINARY: No dysuria, hematuria, or burning on urination.  REPRODUCTIVE: See HPI.   BREASTS: Denies pain, lumps, or nipple discharge.   HEMATOLOGIC: No easy bruisability or excessive bleeding.   MUSCULOSKELETAL: Denies joint pain or swelling.   NEUROLOGIC: Denies syncope or weakness.   PSYCHIATRIC: Denies depression, anxiety or mood swings.    PE:   APPEARANCE: Well nourished, well developed, White female in no acute distress.  NODES: no cervical, supraclavicular, or inguinal lymphadenopathy  BREASTS: Symmetrical, no skin changes or visible lesions. No palpable masses, nipple discharge or adenopathy bilaterally.  ABDOMEN: Soft. No tenderness or masses. No distention. No hernias palpated. No CVA tenderness.  VULVA: No lesions. Normal external female genitalia.  URETHRAL MEATUS: Normal size and location, no lesions, no prolapse.  URETHRA: No masses, tenderness, or prolapse.  VAGINA: Moist. No lesions or lacerations noted. No abnormal discharge present. No odor present.   CERVIX: No lesions or discharge. No cervical motion tenderness.   UTERUS: Normal size, regular shape, mobile, non-tender.  ADNEXA: No tenderness. No fullness or masses palpated in the adnexal regions.   ANUS PERINEUM: Normal.    UPT faintly (+)      Diagnosis:  1. Encounter for gynecological examination    2. Amenorrhea    3. Pap smear for cervical cancer  screening    4. Screen for STD (sexually transmitted disease)        Plan:     Orders Placed This Encounter    C. trachomatis/N. gonorrhoeae by AMP DNA    hCG, quantitative    Progesterone    Liquid-based pap smear, screening       Patient was counseled today on the new ACS guidelines for cervical cytology screening as well as the current recommendations for breast cancer screening. She was counseled to follow up with her PCP for other routine health maintenance. Counseling session lasted approximately 10 minutes, and all her questions were answered.    Follow-up with me in 4 weeks for dating u/s and new OB visit.

## 2018-11-19 ENCOUNTER — LAB VISIT (OUTPATIENT)
Dept: LAB | Facility: HOSPITAL | Age: 33
End: 2018-11-19
Attending: OBSTETRICS & GYNECOLOGY
Payer: COMMERCIAL

## 2018-11-19 ENCOUNTER — TELEPHONE (OUTPATIENT)
Dept: OBSTETRICS AND GYNECOLOGY | Facility: CLINIC | Age: 33
End: 2018-11-19

## 2018-11-19 DIAGNOSIS — N91.4 SECONDARY OLIGOMENORRHEA: Primary | ICD-10-CM

## 2018-11-19 DIAGNOSIS — N91.4 SECONDARY OLIGOMENORRHEA: ICD-10-CM

## 2018-11-19 LAB
C TRACH DNA SPEC QL NAA+PROBE: NOT DETECTED
HCG INTACT+B SERPL-ACNC: 122 MIU/ML
N GONORRHOEA DNA SPEC QL NAA+PROBE: NOT DETECTED
PROGEST SERPL-MCNC: 11.1 NG/ML

## 2018-11-19 PROCEDURE — 36415 COLL VENOUS BLD VENIPUNCTURE: CPT | Mod: PO

## 2018-11-19 PROCEDURE — 84702 CHORIONIC GONADOTROPIN TEST: CPT

## 2018-11-19 PROCEDURE — 84144 ASSAY OF PROGESTERONE: CPT

## 2018-11-20 ENCOUNTER — PATIENT MESSAGE (OUTPATIENT)
Dept: OBSTETRICS AND GYNECOLOGY | Facility: CLINIC | Age: 33
End: 2018-11-20

## 2018-11-23 ENCOUNTER — PATIENT MESSAGE (OUTPATIENT)
Dept: OBSTETRICS AND GYNECOLOGY | Facility: CLINIC | Age: 33
End: 2018-11-23

## 2018-12-17 ENCOUNTER — OFFICE VISIT (OUTPATIENT)
Dept: OBSTETRICS AND GYNECOLOGY | Facility: CLINIC | Age: 33
End: 2018-12-17
Payer: COMMERCIAL

## 2018-12-17 ENCOUNTER — PROCEDURE VISIT (OUTPATIENT)
Dept: OBSTETRICS AND GYNECOLOGY | Facility: CLINIC | Age: 33
End: 2018-12-17
Payer: COMMERCIAL

## 2018-12-17 VITALS
WEIGHT: 146.81 LBS | SYSTOLIC BLOOD PRESSURE: 118 MMHG | HEIGHT: 66 IN | BODY MASS INDEX: 23.59 KG/M2 | DIASTOLIC BLOOD PRESSURE: 78 MMHG

## 2018-12-17 DIAGNOSIS — N91.2 AMENORRHEA: ICD-10-CM

## 2018-12-17 DIAGNOSIS — Z32.01 POSITIVE PREGNANCY TEST: ICD-10-CM

## 2018-12-17 DIAGNOSIS — N91.2 AMENORRHEA: Primary | ICD-10-CM

## 2018-12-17 DIAGNOSIS — O02.1 MISSED ABORTION: Primary | ICD-10-CM

## 2018-12-17 LAB
B-HCG UR QL: POSITIVE
CTP QC/QA: YES

## 2018-12-17 PROCEDURE — 3008F BODY MASS INDEX DOCD: CPT | Mod: CPTII,S$GLB,, | Performed by: NURSE PRACTITIONER

## 2018-12-17 PROCEDURE — 87186 SC STD MICRODIL/AGAR DIL: CPT

## 2018-12-17 PROCEDURE — 81025 URINE PREGNANCY TEST: CPT | Mod: S$GLB,,, | Performed by: NURSE PRACTITIONER

## 2018-12-17 PROCEDURE — 99214 OFFICE O/P EST MOD 30 MIN: CPT | Mod: S$GLB,,, | Performed by: OBSTETRICS & GYNECOLOGY

## 2018-12-17 PROCEDURE — 99214 OFFICE O/P EST MOD 30 MIN: CPT | Mod: S$GLB,,, | Performed by: NURSE PRACTITIONER

## 2018-12-17 PROCEDURE — 99999 PR PBB SHADOW E&M-EST. PATIENT-LVL II: CPT | Mod: PBBFAC,,, | Performed by: OBSTETRICS & GYNECOLOGY

## 2018-12-17 PROCEDURE — 87086 URINE CULTURE/COLONY COUNT: CPT

## 2018-12-17 PROCEDURE — 87088 URINE BACTERIA CULTURE: CPT

## 2018-12-17 PROCEDURE — 87491 CHLMYD TRACH DNA AMP PROBE: CPT

## 2018-12-17 PROCEDURE — 76801 OB US < 14 WKS SINGLE FETUS: CPT | Mod: S$GLB,,, | Performed by: OBSTETRICS & GYNECOLOGY

## 2018-12-17 PROCEDURE — 87077 CULTURE AEROBIC IDENTIFY: CPT

## 2018-12-17 PROCEDURE — 99999 PR PBB SHADOW E&M-EST. PATIENT-LVL IV: CPT | Mod: PBBFAC,,, | Performed by: NURSE PRACTITIONER

## 2018-12-17 RX ORDER — CLINDAMYCIN PHOSPHATE 11.9 MG/ML
SOLUTION TOPICAL
Refills: 6 | COMMUNITY
Start: 2018-12-11 | End: 2021-04-09

## 2018-12-17 NOTE — PROGRESS NOTES
"  CC: Positive Pregnancy Test    HISTORY OF PRESENT ILLNESS:    Alisa Herrera is a 33 y.o. female, ,  Presents today for a routine exam complaining of amenorrhea and positive home urine pregnancy test.  Patient's last menstrual period was 10/11/2018.  This is her first pregnancy. Sees Dr. Glover for her GYN care. Feels good overall. Occasional nausea. Has ultrasound after this appointment. Has noticed some spotting after intercourse only. Hx of UC, (taking Asacol) TMJ (uses Flexeril prn), chlamydia in , appendectomy in . Had pregnancy confirmed with a beta level in November when she saw Dr. Glover for her annual exam. Usually sees her at the [place] location.     LMP: 10/11/18  EGA: 9w4d  EDC: 19    ROS:  GENERAL: No weight changes. No swelling. No fatigue. No fever.  CARDIOVASCULAR: No chest pain. No shortness of breath. No leg cramps.   NEUROLOGICAL: No headaches. No vision changes.  BREASTS: No pain. No lumps. No discharge.  ABDOMEN: No pain. No diarrhea. No constipation.  REPRODUCTIVE: No abnormal bleeding.   VULVA: No pain. No lesions. No itching.  VAGINA: No relaxation. No itching. No odor. No discharge. No lesions.  URINARY: No incontinence. No nocturia. No frequency. No dysuria.    MEDICATIONS AND ALLERGIES:  Reviewed    COMPREHENSIVE GYN HISTORY:  PAP History: Denies abnormal Paps.  Infection History: Denies STDs. Denies PID.  Benign History: Denies uterine fibroids. Denies ovarian cysts. Denies endometriosis. Denies other conditions.  Cancer History: Denies cervical cancer. Denies uterine cancer or hyperplasia. Denies ovarian cancer. Denies vulvar cancer or pre-cancer. Denies vaginal cancer or pre-cancer. Denies breast cancer. Denies colon cancer.  Sexual Activity History: Reports currently being sexually active  Menstrual History: None.  Contraception: None    /78   Ht 5' 6" (1.676 m)   Wt 66.6 kg (146 lb 13.2 oz)   LMP 10/11/2018   BMI 23.70 kg/m² "     PE:  AFFECT: Calm, alert and oriented X 3. Interactive during exam  GENERAL: Appears well-nourished, well-developed, in no acute distress.  HEAD: Normocephalic, atruamatic  TEETH: Good dentition.  THYROID: No thyromegally   BREASTS: No masses, skin changes, nipple discharge or adenopathy bilaterally.  SKIN: Normal for race, warm, & dry. No lesions or rashes.  LUNGS: Easy and unlabored, clear to auscultation bilaterally.  HEART: Regular rate and rhythm   ABDOMEN: Soft and nontender without masses or organomegally.  VULVA: No lesions, masses or tenderness.  VAGINA: Moist and well rugated without lesions or discharge.  CERVIX: Moist and pink without lesions, discharge or tenderness.      UTERUS SIZE: 8 week size, nontender and without masses.  ADNEXA: No masses or tenderness.  ESTIMATE OF PELVIC CAPACITY: Adequate  EXTREMITIES: No cyanosis, clubbing or edema. No calf tenderness.  LYMPH NODES: No axillary or inguinal adenopathy.    PROCEDURES:  UPT Positive  Genprobe  Pap current      ASSESSMENT/PLAN:  Amenorrhea  Positive urine pregnancy test (MONA: 19, EGA: 9w4d based on LMP)    -  Routine prenatal care    Nausea and vomiting in pregnancy    -  Education regarding lifestyle and dietary modifications    -  Advised use of B6/Unisom. Pt will notify us if no relief/worsening symptoms, will consider Zofran if needed.      1st TRIMESTER COUNSELING: Discussed all, booklet provided:  Common complaints of pregnancy  HIV and other routine prenatal tests including  genetic screening  Risk factors identified by prenatal history  Oriented to practice - discussed anticipated course of prenatal care & indications for Ultrasound  Childbirth classes/Hospital facilities   Nutrition and weight gain counseling  Toxoplasmosis precautions (Cats/Raw Meat)  Sexual activity and exercise  Environmental/Work hazards  Travel  Tobacco (Ask, Advise, Assess, Assist, and Arrange), as well as alcohol and drug use  Use of any  medications (Including supplements, Vitamins, Herbs, or OTC Drugs)  Domestic violence  Seat belt use    TERATOLOGY COUNSELING:   Discussed indications and options for aneuploidy screening - pamphlets given    -  Pt declines testing    FOLLOW-UP in 4 weeks with Dr. Belen SAMSON labs and ultrasound today  Pap current    Betina Arias NP  OB/GYN

## 2018-12-17 NOTE — H&P (VIEW-ONLY)
HPI: Pt here today to discuss management for missed AB. Seen today for dating scan and was supposed to be 8 weeks and baby was only measuring 6 weeks with no heart beat seen. She denies VB or pain.     ROS:  GENERAL: Feeling well overall. Denies fever or chills.   SKIN: Denies rash or lesions.   HEAD: Denies head injury or headache.   NODES: Denies enlarged lymph nodes.   CHEST: Denies chest pain or shortness of breath.   CARDIOVASCULAR: Denies palpitations or left sided chest pain.   ABDOMEN: No abdominal pain, constipation, diarrhea, nausea, vomiting or rectal bleeding.   URINARY: No dysuria, hematuria, or burning on urination.  REPRODUCTIVE: See HPI.   BREASTS: Denies pain, lumps, or nipple discharge.   HEMATOLOGIC: No easy bruisability or excessive bleeding.   MUSCULOSKELETAL: Denies joint pain or swelling.   NEUROLOGIC: Denies syncope or weakness.   PSYCHIATRIC: Denies depression, anxiety or mood swings.    PE:   APPEARANCE: Well nourished, well developed, White female in no acute distress.  ABDOMEN: Soft. No tenderness or masses. No distention. No hernias palpated. No CVA tenderness.  VULVA: No lesions. Normal external female genitalia.  URETHRAL MEATUS: Normal size and location, no lesions, no prolapse.  URETHRA: No masses, tenderness, or prolapse.  VAGINA: Moist. No lesions or lacerations noted. No abnormal discharge present. No odor present.   CERVIX: No lesions or discharge. No cervical motion tenderness.   UTERUS: Normal size, regular shape, mobile, non-tender.  ADNEXA: No tenderness. No fullness or masses palpated in the adnexal regions.   ANUS PERINEUM: Normal.      Diagnosis:  1. Missed         Plan:     Orders Placed This Encounter    Case Request Operating Room: DILATION AND CURETTAGE, UTERUS, USING SUCTION     Discussed with the patient management options for missed , including expectant management, medical management with Cytotec, and surgical management with D+C. The risks and  benefits of each option were discussed and all of her questions were answered.     - Suction D+C scheduled for tomorrow at 12:00 noon.     I have discussed the risks, benefits, indications, and alternatives of the procedure in detail with the patient.  She verbalizes her understanding.  All questions answered.  Surgical and blood consents signed.  The patient agrees to proceed with procedure as planned.      Follow-up with me PRN. No post op visit needed.

## 2018-12-18 ENCOUNTER — ANESTHESIA EVENT (OUTPATIENT)
Dept: SURGERY | Facility: OTHER | Age: 33
End: 2018-12-18
Payer: COMMERCIAL

## 2018-12-18 ENCOUNTER — ANESTHESIA (OUTPATIENT)
Dept: SURGERY | Facility: OTHER | Age: 33
End: 2018-12-18
Payer: COMMERCIAL

## 2018-12-18 ENCOUNTER — HOSPITAL ENCOUNTER (OUTPATIENT)
Facility: OTHER | Age: 33
Discharge: HOME | End: 2018-12-18
Attending: OBSTETRICS & GYNECOLOGY | Admitting: OBSTETRICS & GYNECOLOGY
Payer: COMMERCIAL

## 2018-12-18 VITALS
OXYGEN SATURATION: 100 % | SYSTOLIC BLOOD PRESSURE: 112 MMHG | DIASTOLIC BLOOD PRESSURE: 78 MMHG | BODY MASS INDEX: 23.46 KG/M2 | TEMPERATURE: 98 F | WEIGHT: 146 LBS | HEART RATE: 65 BPM | RESPIRATION RATE: 16 BRPM | HEIGHT: 66 IN

## 2018-12-18 DIAGNOSIS — O02.1 MISSED ABORTION: Primary | ICD-10-CM

## 2018-12-18 PROBLEM — Z98.890 STATUS POST DILATION AND CURETTAGE: Status: ACTIVE | Noted: 2018-12-18

## 2018-12-18 LAB
ABO + RH BLD: NORMAL
BLD GP AB SCN CELLS X3 SERPL QL: NORMAL

## 2018-12-18 PROCEDURE — 36000705 HC OR TIME LEV I EA ADD 15 MIN: Performed by: OBSTETRICS & GYNECOLOGY

## 2018-12-18 PROCEDURE — 71000016 HC POSTOP RECOV ADDL HR: Performed by: OBSTETRICS & GYNECOLOGY

## 2018-12-18 PROCEDURE — 36415 COLL VENOUS BLD VENIPUNCTURE: CPT

## 2018-12-18 PROCEDURE — 88305 TISSUE EXAM BY PATHOLOGIST: CPT | Mod: 26,,, | Performed by: PATHOLOGY

## 2018-12-18 PROCEDURE — 71000039 HC RECOVERY, EACH ADD'L HOUR: Performed by: OBSTETRICS & GYNECOLOGY

## 2018-12-18 PROCEDURE — 25000003 PHARM REV CODE 250: Performed by: ANESTHESIOLOGY

## 2018-12-18 PROCEDURE — 71000015 HC POSTOP RECOV 1ST HR: Performed by: OBSTETRICS & GYNECOLOGY

## 2018-12-18 PROCEDURE — 36000704 HC OR TIME LEV I 1ST 15 MIN: Performed by: OBSTETRICS & GYNECOLOGY

## 2018-12-18 PROCEDURE — 37000008 HC ANESTHESIA 1ST 15 MINUTES: Performed by: OBSTETRICS & GYNECOLOGY

## 2018-12-18 PROCEDURE — 71000033 HC RECOVERY, INTIAL HOUR: Performed by: OBSTETRICS & GYNECOLOGY

## 2018-12-18 PROCEDURE — 59820 CARE OF MISCARRIAGE: CPT | Mod: ,,, | Performed by: OBSTETRICS & GYNECOLOGY

## 2018-12-18 PROCEDURE — 88305 TISSUE EXAM BY PATHOLOGIST: CPT | Performed by: PATHOLOGY

## 2018-12-18 PROCEDURE — 63600175 PHARM REV CODE 636 W HCPCS: Performed by: NURSE ANESTHETIST, CERTIFIED REGISTERED

## 2018-12-18 PROCEDURE — 25000003 PHARM REV CODE 250: Performed by: NURSE ANESTHETIST, CERTIFIED REGISTERED

## 2018-12-18 PROCEDURE — 37000009 HC ANESTHESIA EA ADD 15 MINS: Performed by: OBSTETRICS & GYNECOLOGY

## 2018-12-18 PROCEDURE — 25000003 PHARM REV CODE 250: Performed by: OBSTETRICS & GYNECOLOGY

## 2018-12-18 PROCEDURE — 86850 RBC ANTIBODY SCREEN: CPT

## 2018-12-18 PROCEDURE — 63600175 PHARM REV CODE 636 W HCPCS: Performed by: ANESTHESIOLOGY

## 2018-12-18 PROCEDURE — 25000003 PHARM REV CODE 250: Performed by: STUDENT IN AN ORGANIZED HEALTH CARE EDUCATION/TRAINING PROGRAM

## 2018-12-18 RX ORDER — SODIUM CHLORIDE, SODIUM LACTATE, POTASSIUM CHLORIDE, CALCIUM CHLORIDE 600; 310; 30; 20 MG/100ML; MG/100ML; MG/100ML; MG/100ML
INJECTION, SOLUTION INTRAVENOUS CONTINUOUS PRN
Status: DISCONTINUED | OUTPATIENT
Start: 2018-12-18 | End: 2018-12-18

## 2018-12-18 RX ORDER — DOXYCYCLINE HYCLATE 100 MG
200 TABLET ORAL ONCE
Status: COMPLETED | OUTPATIENT
Start: 2018-12-18 | End: 2018-12-18

## 2018-12-18 RX ORDER — PROPOFOL 10 MG/ML
VIAL (ML) INTRAVENOUS
Status: DISCONTINUED | OUTPATIENT
Start: 2018-12-18 | End: 2018-12-18

## 2018-12-18 RX ORDER — ONDANSETRON 2 MG/ML
4 INJECTION INTRAMUSCULAR; INTRAVENOUS DAILY PRN
Status: DISCONTINUED | OUTPATIENT
Start: 2018-12-18 | End: 2018-12-18 | Stop reason: HOSPADM

## 2018-12-18 RX ORDER — FENTANYL CITRATE 50 UG/ML
25 INJECTION, SOLUTION INTRAMUSCULAR; INTRAVENOUS EVERY 5 MIN PRN
Status: COMPLETED | OUTPATIENT
Start: 2018-12-18 | End: 2018-12-18

## 2018-12-18 RX ORDER — FENTANYL CITRATE 50 UG/ML
INJECTION, SOLUTION INTRAMUSCULAR; INTRAVENOUS
Status: DISCONTINUED | OUTPATIENT
Start: 2018-12-18 | End: 2018-12-18

## 2018-12-18 RX ORDER — ONDANSETRON 8 MG/1
8 TABLET, ORALLY DISINTEGRATING ORAL EVERY 8 HOURS PRN
Status: DISCONTINUED | OUTPATIENT
Start: 2018-12-18 | End: 2018-12-18 | Stop reason: HOSPADM

## 2018-12-18 RX ORDER — IBUPROFEN 800 MG/1
800 TABLET ORAL EVERY 8 HOURS PRN
Qty: 30 TABLET | Refills: 2 | Status: SHIPPED | OUTPATIENT
Start: 2018-12-18 | End: 2019-09-19

## 2018-12-18 RX ORDER — LIDOCAINE HCL/PF 100 MG/5ML
SYRINGE (ML) INTRAVENOUS
Status: DISCONTINUED | OUTPATIENT
Start: 2018-12-18 | End: 2018-12-18

## 2018-12-18 RX ORDER — KETOROLAC TROMETHAMINE 30 MG/ML
30 INJECTION, SOLUTION INTRAMUSCULAR; INTRAVENOUS ONCE
Status: COMPLETED | OUTPATIENT
Start: 2018-12-18 | End: 2018-12-18

## 2018-12-18 RX ORDER — SODIUM CHLORIDE 0.9 % (FLUSH) 0.9 %
3 SYRINGE (ML) INJECTION
Status: DISCONTINUED | OUTPATIENT
Start: 2018-12-18 | End: 2018-12-18 | Stop reason: HOSPADM

## 2018-12-18 RX ORDER — MIDAZOLAM HYDROCHLORIDE 1 MG/ML
INJECTION INTRAMUSCULAR; INTRAVENOUS
Status: DISCONTINUED | OUTPATIENT
Start: 2018-12-18 | End: 2018-12-18

## 2018-12-18 RX ORDER — MEPERIDINE HYDROCHLORIDE 50 MG/ML
12.5 INJECTION INTRAMUSCULAR; INTRAVENOUS; SUBCUTANEOUS ONCE AS NEEDED
Status: DISCONTINUED | OUTPATIENT
Start: 2018-12-18 | End: 2018-12-18 | Stop reason: HOSPADM

## 2018-12-18 RX ORDER — DIPHENHYDRAMINE HCL 25 MG
25 CAPSULE ORAL EVERY 4 HOURS PRN
Status: DISCONTINUED | OUTPATIENT
Start: 2018-12-18 | End: 2018-12-18 | Stop reason: HOSPADM

## 2018-12-18 RX ORDER — DIPHENHYDRAMINE HYDROCHLORIDE 50 MG/ML
25 INJECTION INTRAMUSCULAR; INTRAVENOUS EVERY 4 HOURS PRN
Status: DISCONTINUED | OUTPATIENT
Start: 2018-12-18 | End: 2018-12-18 | Stop reason: HOSPADM

## 2018-12-18 RX ORDER — GLYCOPYRROLATE 0.2 MG/ML
INJECTION INTRAMUSCULAR; INTRAVENOUS
Status: DISCONTINUED | OUTPATIENT
Start: 2018-12-18 | End: 2018-12-18

## 2018-12-18 RX ORDER — HYDROCODONE BITARTRATE AND ACETAMINOPHEN 5; 325 MG/1; MG/1
1 TABLET ORAL EVERY 4 HOURS PRN
Qty: 8 TABLET | Refills: 0 | Status: SHIPPED | OUTPATIENT
Start: 2018-12-18 | End: 2019-07-23

## 2018-12-18 RX ORDER — OXYCODONE HYDROCHLORIDE 5 MG/1
5 TABLET ORAL
Status: DISCONTINUED | OUTPATIENT
Start: 2018-12-18 | End: 2018-12-18 | Stop reason: HOSPADM

## 2018-12-18 RX ORDER — HYDROCODONE BITARTRATE AND ACETAMINOPHEN 5; 325 MG/1; MG/1
1 TABLET ORAL EVERY 4 HOURS PRN
Status: DISCONTINUED | OUTPATIENT
Start: 2018-12-18 | End: 2018-12-18 | Stop reason: HOSPADM

## 2018-12-18 RX ADMIN — MIDAZOLAM HYDROCHLORIDE 2 MG: 1 INJECTION, SOLUTION INTRAMUSCULAR; INTRAVENOUS at 12:12

## 2018-12-18 RX ADMIN — FENTANYL CITRATE 50 MCG: 50 INJECTION, SOLUTION INTRAMUSCULAR; INTRAVENOUS at 12:12

## 2018-12-18 RX ADMIN — OXYCODONE HYDROCHLORIDE 5 MG: 5 TABLET ORAL at 01:12

## 2018-12-18 RX ADMIN — FENTANYL CITRATE 25 MCG: 50 INJECTION INTRAMUSCULAR; INTRAVENOUS at 01:12

## 2018-12-18 RX ADMIN — DOXYCYCLINE 100 MG: 100 INJECTION, POWDER, LYOPHILIZED, FOR SOLUTION INTRAVENOUS at 12:12

## 2018-12-18 RX ADMIN — SODIUM CHLORIDE, SODIUM LACTATE, POTASSIUM CHLORIDE, AND CALCIUM CHLORIDE: 600; 310; 30; 20 INJECTION, SOLUTION INTRAVENOUS at 12:12

## 2018-12-18 RX ADMIN — DOXYCYCLINE HYCLATE 200 MG: 100 TABLET, COATED ORAL at 03:12

## 2018-12-18 RX ADMIN — FENTANYL CITRATE 25 MCG: 50 INJECTION INTRAMUSCULAR; INTRAVENOUS at 02:12

## 2018-12-18 RX ADMIN — FENTANYL CITRATE 50 MCG: 50 INJECTION, SOLUTION INTRAMUSCULAR; INTRAVENOUS at 01:12

## 2018-12-18 RX ADMIN — LIDOCAINE HYDROCHLORIDE 75 MG: 20 INJECTION, SOLUTION INTRAVENOUS at 12:12

## 2018-12-18 RX ADMIN — PROPOFOL 180 MG: 10 INJECTION, EMULSION INTRAVENOUS at 12:12

## 2018-12-18 RX ADMIN — GLYCOPYRROLATE 0.2 MG: 0.2 INJECTION, SOLUTION INTRAMUSCULAR; INTRAVENOUS at 12:12

## 2018-12-18 RX ADMIN — KETOROLAC TROMETHAMINE 30 MG: 30 INJECTION, SOLUTION INTRAMUSCULAR at 03:12

## 2018-12-18 RX ADMIN — CARBOXYMETHYLCELLULOSE SODIUM 2 DROP: 2.5 SOLUTION/ DROPS OPHTHALMIC at 12:12

## 2018-12-18 NOTE — ANESTHESIA POSTPROCEDURE EVALUATION
"Anesthesia Post Evaluation    Patient: Alisa Herrera    Procedure(s) Performed: Procedure(s) (LRB):  DILATION AND CURETTAGE, UTERUS, USING SUCTION (N/A)    Final Anesthesia Type: general  Patient location during evaluation: PACU  Patient participation: Yes- Able to Participate  Level of consciousness: oriented and awake  Post-procedure vital signs: reviewed and stable  Pain management: adequate  Airway patency: patent  PONV status at discharge: No PONV  Anesthetic complications: no      Cardiovascular status: hemodynamically stable  Respiratory status: unassisted, spontaneous ventilation and room air  Hydration status: euvolemic  Follow-up not needed.        Visit Vitals  /60   Pulse 68   Temp 36.3 °C (97.4 °F) (Oral)   Resp 20   Ht 5' 6" (1.676 m)   Wt 66.2 kg (146 lb)   SpO2 96%   Breastfeeding? No   BMI 23.57 kg/m²       Pain/Kaelyn Score: Pain Rating Prior to Med Admin: 3 (12/18/2018  2:18 PM)  Pain Rating Post Med Admin: 1 (12/18/2018  2:18 PM)  Kaelyn Score: 10 (12/18/2018  2:23 PM)        "

## 2018-12-18 NOTE — TRANSFER OF CARE
"Anesthesia Transfer of Care Note    Patient: Alisa Herrera    Procedure(s) Performed: Procedure(s) (LRB):  DILATION AND CURETTAGE, UTERUS, USING SUCTION (N/A)    Patient location: PACU    Anesthesia Type: general    Transport from OR: Transported from OR on 2-3 L/min O2 by NC with adequate spontaneous ventilation    Post pain: adequate analgesia    Post assessment: no apparent anesthetic complications    Post vital signs: stable    Level of consciousness: awake    Nausea/Vomiting: no nausea/vomiting    Complications: none    Transfer of care protocol was followed      Last vitals:   Visit Vitals  /71 (BP Location: Right arm, Patient Position: Lying)   Pulse 68   Temp 37.1 °C (98.8 °F) (Oral)   Resp 16   Ht 5' 6" (1.676 m)   Wt 66.2 kg (146 lb)   SpO2 100%   Breastfeeding? No   BMI 23.57 kg/m²     "

## 2018-12-18 NOTE — BRIEF OP NOTE
[facility]  Brief Op Note/Discharge Note  Short Stay    Admit Date: 2018    Discharge Date: 2018    Attending Physician: Consuelo Glover MD     Surgery Date: 2018     Surgeon(s) and Role:     * Consuelo Glover MD - Primary    Assisting Surgeon: None    Pre-op Diagnosis:  Missed  [O02.1]    Post-op Diagnosis:  Post-Op Diagnosis Codes:     * Missed  [O02.1]    Procedure(s) (LRB):  DILATION AND CURETTAGE, UTERUS, USING SUCTION (N/A)    Anesthesia: General    Findings/Key Components:   1. Normal appearing external genitalia and cervix   2. Uterus sounded to approximately 9.5 cm   3. Cervix serially dilated to 9 mm   4. 9 mm suction curetted used to remove products of conception   5. Endometrial curettage performed until uniform grittiness felt in all four quadrants   6. Final sweep with suction curette to ensure all products removed   7. Bleeding from cervical os minimal to none at procedure close     Estimated Blood Loss: 50 cc         Specimens:   Specimen (12h ago, onward)    Start     Ordered    18 1309  Specimen to Pathology - Surgery  Once     Comments:  1.) Products of Conception (ps)     Start Status   18 1309 Collected (18 1313)       18 1309          Discharge Provider: Camila Borrero    Diagnoses:  Active Hospital Problems    Diagnosis  POA    *Missed  [O02.1]  Yes    Status post dilation and curettage [Z98.890]  Not Applicable      Resolved Hospital Problems   No resolved problems to display.       Discharged Condition: good    Hospital Course:   Patient was admitted for outpatient procedure as above, and tolerated the procedure well with no complications. Please see operative report for further details. Following the procedure, the patient was awakened from anesthesia and transferred to the recovery area in stable condition. She was discharged to home once ambulating, voiding, tolerating PO intake, and pain was  well-controlled. Patient was given routine post-op instructions and prescriptions for pain medication to take as needed. Patient instructed to follow up with provider as needed. No specific follow up appointment required.    Final Diagnoses: Same as principal problem.    Disposition: Home or Self Care    Follow up/Patient Instructions:    Medications:  Reconciled Home Medications:      Medication List      START taking these medications    HYDROcodone-acetaminophen 5-325 mg per tablet  Commonly known as:  NORCO  Take 1 tablet by mouth every 4 (four) hours as needed for Pain.     ibuprofen 800 MG tablet  Commonly known as:  ADVIL,MOTRIN  Take 1 tablet (800 mg total) by mouth every 8 (eight) hours as needed for Pain.        CONTINUE taking these medications    CLENPIQ 10 mg-3.5 gram -12 gram/160 mL Soln  Generic drug:  sod picosulf-mag ox-citric ac  USE AS DIRECTED     clindamycin 1 % external solution  Commonly known as:  CLEOCIN T  APPLY TO SCALP DAILY AS NEEDED     cyclobenzaprine 10 MG tablet  Commonly known as:  FLEXERIL  TAKE 1/2 TO 1 TABLET BY MOUTH AT BEDTIME     fish oil-omega-3 fatty acids 300-1,000 mg capsule  Take 2 g by mouth once daily.     FLUCELVAX QUAD 3478-1495 (PF) 60 mcg (15 mcg x 4)/0.5 mL Syrg vaccine  Generic drug:  influenza  TO BE ADMINISTERED BY PHARMACIST FOR IMMUNIZATION     FLUZONE QUAD 0503-5512 (PF) 60 mcg (15 mcg x 4)/0.5 mL Syrg  Generic drug:  flu vac [id number] 36mos up(PF)  TO BE ADMINISTERED BY PHARMACIST FOR IMMUNIZATION     mesalamine 800 mg Tbec  Commonly known as:  ASACOL HD  Take 2 tablets (1,600 mg total) by mouth 2 (two) times daily.     multivitamin capsule  Take 1 capsule by mouth once daily.     ondansetron 4 MG Tbdl  Commonly known as:  ZOFRAN-ODT  Take 1 tablet (4 mg total) by mouth every 6 (six) hours as needed (nausea).     turmeric (bulk) 95 % Powd  by Misc.(Non-Drug; Combo Route) route.     VITAMIN B-12 1000 MCG tablet  Generic drug:  cyanocobalamin  Take 100 mcg by  mouth once daily.          Discharge Procedure Orders   Diet general     Other restrictions (specify):   Order Comments: Nothing in the vagina for four weeks     Call MD for:  temperature >100.4     Call MD for:  persistent nausea and vomiting     Call MD for:  severe uncontrolled pain     Call MD for:  difficulty breathing, headache or visual disturbances     Call MD for:  hives     Call MD for:  persistent dizziness or light-headedness     Call MD for:  extreme fatigue     Call MD for:   Order Comments: Heavy vaginal bleeding >1 pad/hour for greater than 2 hours     Activity as tolerated     Follow-up Information     Consuelo Glover MD.    Specialties:  Obstetrics and Gynecology, Obstetrics  Why:  As needed  Contact information:  [address] [id number] [phone]                   Camila Borrero MD  OBGYN, PGY-1

## 2018-12-18 NOTE — OP NOTE
OPERATIVE NOTE    DATE OF PROCEDURE: 2018    SURGEON: Consuelo Glover     ASSISTANT: Camila Borrero MD-PGY1    PREOPERATIVE DIAGNOSIS: Missed     POSTOPERATIVE DIAGNOSIS: Same.     PROCEDURE: Suction Dilation and curettage.     ANESTHESIA: General    FINDINGS: Uterus sounded to 9.5 cm. Cervix was dilated to 9 mm. Suction D&C performed yielding moderate amount of tissue. Bleeding minimal at completion of procedure.    IV FLUIDS: 400 ml    UOP: approximately 100  Ml via in and out catheterization prior to procedure start     ESTIMATED BLOOD LOSS: 50 mL.      SPECIMEN: products of conception     INDICATIONS: Ultrasound performed prior to the procedure confirmed no fetal cardiac activity with CRL of 7.4 mm consistent with embryonic demise.  Patient was counseled extensively on medical and surgical options and opted for surgical management via suction dilation and curettage.     PROCEDURE IN DETAIL: After proper consents were explained and obtained, the   patient was taken to the Operating Room where general anesthesia was obtained without difficulty. She was then placed in dorsal lithotomy position using Apollo stirrups. The patient was prepped and draped in normal sterile fashion. Bladder was drained via in-and-out catheterization prior to procedure. A surgical timeout was performed with patient's name, date of birth, allergies, and procedure to be performed verbalized. All OR staff were in agreement. Preoperative antibiotics doxycycline 100 mg were administered as indicated.     Attention was then turned to the vagina where a weighted speculum was placed into the vagina. A right angle retractor was used to visualize the cervix, which was grasped at the anterior lip with the single tooth tenaculum. The uterus was gently sounded to 9.5 cm. Cervix did not pull. The cervix was then serially dilated to 9mm, and a 9 mm curved suction curette was introduced into the endometrial cavity and suction was  applied to remove the products of conception. Serial sweeps were performed and products of conception were removed. Using a Alfredo curette, serial sweeps were performed until a gritty consistency of the uterine lining was felt in all 4 quadrants. A final pass was performed with the suction curette to ensure that all remaining products of conception were removed. Bleeding from the uterus was minimal to none at completion of the procedure.    The tenaculum was removed and good hemostasis was noted. All instruments were removed from the vagina. All counts were correct x2.    The patient tolerated the procedure well and was awakened without difficulty.  The patient was taken to Recovery area in stable condition.    Camila Borrero MD  OBGYN, PGY-1

## 2018-12-18 NOTE — ANESTHESIA PREPROCEDURE EVALUATION
12/18/2018  Alisa Herrera is a 33 y.o., female.    Anesthesia Evaluation    I have reviewed the Patient Summary Reports.     I have reviewed the Medications.     Review of Systems  Anesthesia Hx:  No problems with previous Anesthesia    Social:  Non-Smoker    Cardiovascular:   Exercise tolerance: good    Pulmonary:  Pulmonary Normal    Hepatic/GI:   PUD,    Endocrine:  Endocrine Normal        Physical Exam  General:  Well nourished    Airway/Jaw/Neck:  Airway Findings: Mouth Opening: Normal Tongue: Normal  General Airway Assessment: Adult  Mallampati: II  TM Distance: Normal, at least 6 cm  Jaw/Neck Findings:     Neck ROM: Normal ROM      Dental:  Dental Findings: In tact             Anesthesia Plan  Type of Anesthesia, risks & benefits discussed:  Anesthesia Type:  general  Patient's Preference:   Intra-op Monitoring Plan:   Intra-op Monitoring Plan Comments:   Post Op Pain Control Plan:   Post Op Pain Control Plan Comments:   Induction:   IV  Beta Blocker:         Informed Consent: Patient understands risks and agrees with Anesthesia plan.  Questions answered. Anesthesia consent signed with patient.  ASA Score: 2     Day of Surgery Review of History & Physical:    H&P update referred to the surgeon.         Ready For Surgery From Anesthesia Perspective.

## 2018-12-18 NOTE — PLAN OF CARE
Alisa Herrera has met all discharge criteria from Phase II. Vital Signs are stable, ambulating  without difficulty. Discharge instructions given, patient verbalized understanding. Discharged from facility via wheelchair in stable condition.

## 2018-12-18 NOTE — DISCHARGE INSTRUCTIONS
Suction Curettage (Therapeutic Dilation & Curettage, D&C)  Suction curettage is a procedure to remove the lining and contents of the womb (uterus). It may be done to stop bleeding, control pain, and prevent infection after a miscarriage, , or childbirth. It may also be done to remove a molar pregnancy. This is when tumors grow in the womb instead of or in addition to a fetus.  After the procedure, you should be able to return to your normal routine in 1 or 2 days. You may have some cramping and light bleeding, however. This is normal. These problems should go away within 5 to 7 days. You can expect to have your next period within 4 to 6 weeks.  Home care  · If you have pain or cramping, use pain medicine as directed.  · If you have light bleeding, use pads instead of tampons. Change these as often as needed.  · Avoid douching, using tampons, or having sex until your healthcare provider says its OK.  · Take showers instead of baths for 1 to 2 weeks.  Follow-up care  Follow-up with your healthcare provider as directed.  When to seek medical advice  Call your healthcare provider right away if any of these occur:  · Fever of 100.4ºF (38ºC) or higher, or as directed by your provider  · Heavy bleeding  · Bleeding that lasts longer than 1 week  · Pain or cramping worsens instead of getting better  · Foul-smelling discharge from the vagina  · Passage of anything that resembles tissue from the vagina (if possible, save the tissue and bring it to the healthcare provider)  · Weakness, dizziness or fainting  Date Last Reviewed: 2015-2017 [employer]. [address] [place] [address], [place] [address]. All rights reserved. This information is not intended as a substitute for professional medical care. Always follow your healthcare professional's instructions.      Anesthesia: After Your Surgery  Youve just had surgery. During surgery, you received medication called anesthesia to keep you comfortable  and pain-free. After surgery, you may experience some pain or nausea. This is common. Here are some tips for feeling better and recovering after surgery.    Going home  Your doctor or nurse will show you how to take care of yourself when you go home. He or she will also answer your questions. Have an adult family member or friend drive you home. For the first 24 hours after your surgery:  · Do not drive or use heavy equipment.  · Do not make important decisions or sign legal documents.  · Avoid alcohol.  · Have someone stay with you, if needed. He or she can watch for problems and help keep you safe.  Be sure to keep all follow-up appointments with your doctor. And rest after your procedure for as long as your doctor tells you to.    Coping with pain  If you have pain after surgery, pain medication will help you feel better. Take it as directed, before pain becomes severe. Also, ask your doctor or pharmacist about other ways to control pain, such as with heat, ice, and relaxation. And follow any other instructions your surgeon or nurse gives you.    URINARY RETENTION  Should you experience a decrease in your urine output or are unable to urinate following surgery, this can be due to the medications given during surgery.  We recommend you going to the nearest Emergency Department.    Tips for taking pain medication  To get the best relief possible, remember these points:  · Pain medications can upset your stomach. Taking them with a little food may help.  · Most pain relievers taken by mouth need at least 20 to 30 minutes to take effect.  · Taking medication on a schedule can help you remember to take it. Try to time your medication so that you can take it before beginning an activity, such as dressing, walking, or sitting down for dinner.  · Constipation is a common side effect of pain medications. Contact your doctor before taking any medications like laxatives or stool softeners to help relieve constipation. Also  ask about any dietary restrictions, because drinking lots of fluids and eating foods like fruits and vegetables that are high in fiber can also help. Remember, dont take laxatives unless your surgeon has prescribed them.  · Mixing alcohol and pain medication can cause dizziness and slow your breathing. It can even be fatal. Dont drink alcohol while taking pain medication.  · Pain medication can slow your reflexes. Dont drive or operate machinery while taking pain medication.  If your health care provider tells you to take acetaminophen to help relieve your pain, ask him or her how much you are supposed to take each day. (Acetaminophen is the generic name for Tylenol and other brand-name pain relievers.) Acetaminophen or other pain relievers may interact with your prescription medicines or other over-the-counter (OTC) drugs. Some prescription medications contain acetaminophen along with other active ingredients. Using both prescription and OTC acetaminophen for pain can cause you to overdose. The FDA recommends that you read the labels on your OTC medications carefully. This will help you to clearly understand the list of active ingredients, dosing instructions, and any warnings. It may also help you avoid taking too much acetaminophen. If you have questions or don't understand the information, ask your pharmacist or health care provider to explain it to you before you take the OTC medication.    Managing nausea  Some people have an upset stomach after surgery. This is often due to anesthesia, pain, pain medications, or the stress of surgery. The following tips will help you manage nausea and get good nutrition as you recover. If you were on a special diet before surgery, ask your doctor if you should follow it during recovery. These tips may help:  · Dont push yourself to eat. Your body will tell you when to eat and how much.  · Start off with clear liquids and soup. They are easier to digest.  · Progress to  semi-solid foods (mashed potatoes, applesauce, and gelatin) as you feel ready.  · Slowly move to solid foods. Dont eat fatty, rich, or spicy foods at first.  · Dont force yourself to have three large meals a day. Instead, eat smaller amounts more often.  · Take pain medications with a small amount of solid food, such as crackers or toast to avoid nausea.      Call your surgeon if    · You feel too sleepy, dizzy, or groggy (medication may be too strong).  · You have side effects like nausea, vomiting, or skin changes (rash, itching, or hives).   © 1089-8754 [employer]. [address] [place] [address], [place] [address]. All rights reserved. This information is not intended as a substitute for professional medical care. Always follow your healthcare professional's instructions.

## 2018-12-20 LAB
BACTERIA UR CULT: NORMAL
C TRACH DNA SPEC QL NAA+PROBE: NOT DETECTED
N GONORRHOEA DNA SPEC QL NAA+PROBE: NOT DETECTED

## 2018-12-20 RX ORDER — NITROFURANTOIN 25; 75 MG/1; MG/1
100 CAPSULE ORAL 2 TIMES DAILY
Qty: 14 CAPSULE | Refills: 0 | Status: SHIPPED | OUTPATIENT
Start: 2018-12-20 | End: 2018-12-27

## 2019-02-13 ENCOUNTER — IMMUNIZATION (OUTPATIENT)
Dept: URGENT CARE | Facility: CLINIC | Age: 34
End: 2019-02-13

## 2019-02-13 DIAGNOSIS — Z11.1 ENCOUNTER FOR PPD TEST: Primary | ICD-10-CM

## 2019-02-13 PROCEDURE — 86580 TB INTRADERMAL TEST: CPT | Mod: S$GLB,,, | Performed by: EMERGENCY MEDICINE

## 2019-02-13 PROCEDURE — 86580 POCT TB SKIN TEST: ICD-10-PCS | Mod: S$GLB,,, | Performed by: EMERGENCY MEDICINE

## 2019-02-13 NOTE — PROGRESS NOTES
Subjective:       Patient ID: Alisa Herrera is a 33 y.o. female.    Vitals:  vitals were not taken for this visit.     Chief Complaint: PPD Placement    PPD placement       <OUCOOHADULT>    Objective:      Physical Exam    Assessment:       No diagnosis found.    Plan:         There are no diagnoses linked to this encounter.

## 2019-03-12 ENCOUNTER — INITIAL PRENATAL (OUTPATIENT)
Dept: OBSTETRICS AND GYNECOLOGY | Facility: CLINIC | Age: 34
End: 2019-03-12
Payer: COMMERCIAL

## 2019-03-12 ENCOUNTER — LAB VISIT (OUTPATIENT)
Dept: LAB | Facility: OTHER | Age: 34
End: 2019-03-12
Attending: OBSTETRICS & GYNECOLOGY
Payer: COMMERCIAL

## 2019-03-12 VITALS
BODY MASS INDEX: 24.02 KG/M2 | DIASTOLIC BLOOD PRESSURE: 72 MMHG | WEIGHT: 148.81 LBS | SYSTOLIC BLOOD PRESSURE: 112 MMHG

## 2019-03-12 DIAGNOSIS — N91.4 SECONDARY OLIGOMENORRHEA: ICD-10-CM

## 2019-03-12 DIAGNOSIS — N91.4 SECONDARY OLIGOMENORRHEA: Primary | ICD-10-CM

## 2019-03-12 DIAGNOSIS — Z32.01 POSITIVE PREGNANCY TEST: ICD-10-CM

## 2019-03-12 LAB
HCG INTACT+B SERPL-ACNC: NORMAL MIU/ML
PROGEST SERPL-MCNC: 18.8 NG/ML

## 2019-03-12 PROCEDURE — 84144 ASSAY OF PROGESTERONE: CPT

## 2019-03-12 PROCEDURE — 99999 PR PBB SHADOW E&M-EST. PATIENT-LVL II: CPT | Mod: PBBFAC,,, | Performed by: OBSTETRICS & GYNECOLOGY

## 2019-03-12 PROCEDURE — 36415 COLL VENOUS BLD VENIPUNCTURE: CPT

## 2019-03-12 PROCEDURE — 99213 PR OFFICE/OUTPT VISIT, EST, LEVL III, 20-29 MIN: ICD-10-PCS | Mod: S$GLB,,, | Performed by: OBSTETRICS & GYNECOLOGY

## 2019-03-12 PROCEDURE — 3008F BODY MASS INDEX DOCD: CPT | Mod: CPTII,S$GLB,, | Performed by: OBSTETRICS & GYNECOLOGY

## 2019-03-12 PROCEDURE — 84702 CHORIONIC GONADOTROPIN TEST: CPT

## 2019-03-12 PROCEDURE — 3008F PR BODY MASS INDEX (BMI) DOCUMENTED: ICD-10-PCS | Mod: CPTII,S$GLB,, | Performed by: OBSTETRICS & GYNECOLOGY

## 2019-03-12 PROCEDURE — 99999 PR PBB SHADOW E&M-EST. PATIENT-LVL II: ICD-10-PCS | Mod: PBBFAC,,, | Performed by: OBSTETRICS & GYNECOLOGY

## 2019-03-12 PROCEDURE — 99213 OFFICE O/P EST LOW 20 MIN: CPT | Mod: S$GLB,,, | Performed by: OBSTETRICS & GYNECOLOGY

## 2019-03-12 NOTE — PROGRESS NOTES
HPI: Pt is a 33 y.o. female who presents complaining of missed menses and (+) home UPT. She denies vaginal bleeding or abdominal pain. She does not have nausea and vomiting.     Pt has hx of ulcerative colitis and is stable.     She is a dietician.     Hx of SAB s/p D+C in 12/2018.    ROS:  GENERAL: Feeling well overall.   SKIN: Denies rash or lesions.   HEAD: Denies head injury or headache.   NODES: Denies enlarged lymph nodes.   CHEST: Denies chest pain or shortness of breath.   CARDIOVASCULAR: Denies palpitations or left sided chest pain.   ABDOMEN: No abdominal pain, constipation, diarrhea or rectal bleeding.   URINARY: No dysuria, hematuria, or burning on urination.  REPRODUCTIVE: See HPI.   BREASTS: Denies pain, lumps, or nipple discharge.   HEMATOLOGIC: No easy bruisability or excessive bleeding.   MUSCULOSKELETAL: Denies joint pain or swelling.   NEUROLOGIC: Denies syncope or weakness.   PSYCHIATRIC: Denies depression, anxiety or mood swings.    PE:   APPEARANCE: Well nourished, well developed, in no acute distress.  AFFECT: WNL, alert and oriented x 3.  SKIN: No acne or hirsutism.  NECK: Neck symmetric, without masses or thyromegaly.  NODES: No inguinal, cervical, axillary or femoral lymph node enlargement.  CHEST: Good respiratory effort.   ABDOMEN: Soft. No tenderness or masses. No hepatosplenomegaly. No hernias.  PELVIC: deferred.   EXTREMITIES: No edema.    PROCEDURES:  - UPT: positive  - Urine dip: negative  - Dating U/S: to be scheduled with GynUS      Diagnosis:  1. Secondary oligomenorrhea    2. Positive pregnancy test        Plan:     Orders Placed This Encounter    hCG, quantitative    Progesterone    US OB/GYN Procedure ([facility])       - Rx: Prenatal vitamins  - She does not want 1st trimester screening with MFM. Declines screening.     Patient was counseled today on routine 1st trimester precautions, including vaginal bleeding and abdominal pain. We also discussed proper weight gain based  on the [employer]'s recommendations based on her pre-pregnancy weight, foods to avoid in pregnancy (i.e. sushi, fish that are high in mercury, cold deli meat, and unpasteurized cheeses), environmental precautions such as cat litter, and prenatal vitamin options (i.e. stool softener, DHA).     Total face to face time spent with the patient was 20 minutes and over half spent in counseling on the above related issues.     Follow-up with me in 6 weeks for OB check

## 2019-03-13 ENCOUNTER — PATIENT MESSAGE (OUTPATIENT)
Dept: OBSTETRICS AND GYNECOLOGY | Facility: CLINIC | Age: 34
End: 2019-03-13

## 2019-03-14 ENCOUNTER — LAB VISIT (OUTPATIENT)
Dept: LAB | Facility: HOSPITAL | Age: 34
End: 2019-03-14
Attending: OBSTETRICS & GYNECOLOGY
Payer: COMMERCIAL

## 2019-03-14 DIAGNOSIS — N91.4 SECONDARY OLIGOMENORRHEA: ICD-10-CM

## 2019-03-14 PROCEDURE — 36415 COLL VENOUS BLD VENIPUNCTURE: CPT | Mod: PO

## 2019-03-14 PROCEDURE — 84144 ASSAY OF PROGESTERONE: CPT

## 2019-03-14 PROCEDURE — 84702 CHORIONIC GONADOTROPIN TEST: CPT

## 2019-03-15 ENCOUNTER — PATIENT MESSAGE (OUTPATIENT)
Dept: OBSTETRICS AND GYNECOLOGY | Facility: CLINIC | Age: 34
End: 2019-03-15

## 2019-03-15 LAB
HCG INTACT+B SERPL-ACNC: NORMAL MIU/ML
PROGEST SERPL-MCNC: 13.6 NG/ML

## 2019-03-15 RX ORDER — PROGESTERONE 200 MG/1
200 CAPSULE ORAL NIGHTLY
Qty: 30 CAPSULE | Refills: 6 | Status: SHIPPED | OUTPATIENT
Start: 2019-03-15 | End: 2019-07-23

## 2019-04-01 ENCOUNTER — PROCEDURE VISIT (OUTPATIENT)
Dept: OBSTETRICS AND GYNECOLOGY | Facility: CLINIC | Age: 34
End: 2019-04-01
Payer: COMMERCIAL

## 2019-04-01 DIAGNOSIS — N91.4 SECONDARY OLIGOMENORRHEA: ICD-10-CM

## 2019-04-01 PROCEDURE — 76801 OB US < 14 WKS SINGLE FETUS: CPT | Mod: S$GLB,,, | Performed by: OBSTETRICS & GYNECOLOGY

## 2019-04-01 PROCEDURE — 76801 PR US, OB <14WKS, TRANSABD, SINGLE GESTATION: ICD-10-PCS | Mod: S$GLB,,, | Performed by: OBSTETRICS & GYNECOLOGY

## 2019-04-07 ENCOUNTER — PATIENT MESSAGE (OUTPATIENT)
Dept: OBSTETRICS AND GYNECOLOGY | Facility: CLINIC | Age: 34
End: 2019-04-07

## 2019-04-22 ENCOUNTER — ROUTINE PRENATAL (OUTPATIENT)
Dept: OBSTETRICS AND GYNECOLOGY | Facility: CLINIC | Age: 34
End: 2019-04-22
Payer: COMMERCIAL

## 2019-04-22 VITALS
SYSTOLIC BLOOD PRESSURE: 120 MMHG | WEIGHT: 155.44 LBS | BODY MASS INDEX: 25.09 KG/M2 | DIASTOLIC BLOOD PRESSURE: 66 MMHG

## 2019-04-22 DIAGNOSIS — Z34.00 SUPERVISION OF NORMAL FIRST PREGNANCY, ANTEPARTUM: Primary | ICD-10-CM

## 2019-04-22 DIAGNOSIS — Z36.9 ANTENATAL SCREENING ENCOUNTER: ICD-10-CM

## 2019-04-22 PROCEDURE — 0502F PR SUBSEQUENT PRENATAL CARE: ICD-10-PCS | Mod: CPTII,S$GLB,, | Performed by: OBSTETRICS & GYNECOLOGY

## 2019-04-22 PROCEDURE — 99999 PR PBB SHADOW E&M-EST. PATIENT-LVL III: ICD-10-PCS | Mod: PBBFAC,,, | Performed by: OBSTETRICS & GYNECOLOGY

## 2019-04-22 PROCEDURE — 99999 PR PBB SHADOW E&M-EST. PATIENT-LVL III: CPT | Mod: PBBFAC,,, | Performed by: OBSTETRICS & GYNECOLOGY

## 2019-04-22 PROCEDURE — 0502F SUBSEQUENT PRENATAL CARE: CPT | Mod: CPTII,S$GLB,, | Performed by: OBSTETRICS & GYNECOLOGY

## 2019-04-22 NOTE — PROGRESS NOTES
Doing well. No complaints. Will stop Progesterone today as we are 12 wga. Single marker AFP next visit. Anatomy sono ordered. RTC 4 weeks.

## 2019-04-26 ENCOUNTER — PATIENT MESSAGE (OUTPATIENT)
Dept: OBSTETRICS AND GYNECOLOGY | Facility: CLINIC | Age: 34
End: 2019-04-26

## 2019-05-06 ENCOUNTER — PATIENT MESSAGE (OUTPATIENT)
Dept: OBSTETRICS AND GYNECOLOGY | Facility: CLINIC | Age: 34
End: 2019-05-06

## 2019-05-07 NOTE — INTERVAL H&P NOTE
The patient has been examined and the H&P has been reviewed:    I concur with the findings and no changes have occurred since H&P was written. Patient feeling well overall. Denies bleeding/cramping. No questions.     Anesthesia/Surgery risks, benefits and alternative options discussed and understood by patient/family.          Active Hospital Problems    Diagnosis  POA    Missed  [O02.1]  Yes      Resolved Hospital Problems   No resolved problems to display.     
non affiliated

## 2019-05-20 ENCOUNTER — ROUTINE PRENATAL (OUTPATIENT)
Dept: OBSTETRICS AND GYNECOLOGY | Facility: CLINIC | Age: 34
End: 2019-05-20
Payer: COMMERCIAL

## 2019-05-20 ENCOUNTER — LAB VISIT (OUTPATIENT)
Dept: LAB | Facility: HOSPITAL | Age: 34
End: 2019-05-20
Attending: OBSTETRICS & GYNECOLOGY
Payer: COMMERCIAL

## 2019-05-20 VITALS — DIASTOLIC BLOOD PRESSURE: 70 MMHG | BODY MASS INDEX: 25.8 KG/M2 | SYSTOLIC BLOOD PRESSURE: 122 MMHG | WEIGHT: 159.81 LBS

## 2019-05-20 DIAGNOSIS — Z36.9 ANTENATAL SCREENING ENCOUNTER: ICD-10-CM

## 2019-05-20 DIAGNOSIS — Z34.00 SUPERVISION OF NORMAL FIRST PREGNANCY, ANTEPARTUM: Primary | ICD-10-CM

## 2019-05-20 DIAGNOSIS — N91.2 AMENORRHEA: ICD-10-CM

## 2019-05-20 DIAGNOSIS — Z32.01 POSITIVE PREGNANCY TEST: ICD-10-CM

## 2019-05-20 LAB — RPR SER QL: NORMAL

## 2019-05-20 PROCEDURE — 0502F PR SUBSEQUENT PRENATAL CARE: ICD-10-PCS | Mod: CPTII,S$GLB,, | Performed by: OBSTETRICS & GYNECOLOGY

## 2019-05-20 PROCEDURE — 82105 ALPHA-FETOPROTEIN SERUM: CPT

## 2019-05-20 PROCEDURE — 0502F SUBSEQUENT PRENATAL CARE: CPT | Mod: CPTII,S$GLB,, | Performed by: OBSTETRICS & GYNECOLOGY

## 2019-05-20 PROCEDURE — 36415 COLL VENOUS BLD VENIPUNCTURE: CPT | Mod: PN

## 2019-05-20 PROCEDURE — 86762 RUBELLA ANTIBODY: CPT

## 2019-05-20 PROCEDURE — 87340 HEPATITIS B SURFACE AG IA: CPT

## 2019-05-20 PROCEDURE — 86703 HIV-1/HIV-2 1 RESULT ANTBDY: CPT

## 2019-05-20 PROCEDURE — 99999 PR PBB SHADOW E&M-EST. PATIENT-LVL III: CPT | Mod: PBBFAC,,, | Performed by: OBSTETRICS & GYNECOLOGY

## 2019-05-20 PROCEDURE — 99999 PR PBB SHADOW E&M-EST. PATIENT-LVL III: ICD-10-PCS | Mod: PBBFAC,,, | Performed by: OBSTETRICS & GYNECOLOGY

## 2019-05-20 PROCEDURE — 86592 SYPHILIS TEST NON-TREP QUAL: CPT

## 2019-05-21 LAB
HBV SURFACE AG SERPL QL IA: NEGATIVE
HIV 1+2 AB+HIV1 P24 AG SERPL QL IA: NEGATIVE
RUBV IGG SER-ACNC: 12.2 IU/ML
RUBV IGG SER-IMP: REACTIVE

## 2019-05-22 LAB
# FETUSES US: NORMAL
AFP INTERPRETATION: NORMAL
AFP MOM SERPL: 1.04
AFP SERPL-MCNC: 37.1 NG/ML
AFP SERPL-MCNC: NEGATIVE NG/ML
AGE AT DELIVERY: 33
GA (DAYS): 2 D
GA (WEEKS): 16 WK
GA METHOD: NORMAL
IDDM PATIENT QL: NORMAL
SMOKING STATUS FTND: NO

## 2019-06-10 ENCOUNTER — PROCEDURE VISIT (OUTPATIENT)
Dept: MATERNAL FETAL MEDICINE | Facility: CLINIC | Age: 34
End: 2019-06-10
Payer: COMMERCIAL

## 2019-06-10 DIAGNOSIS — Z36.9 ANTENATAL SCREENING ENCOUNTER: ICD-10-CM

## 2019-06-10 DIAGNOSIS — Z36.3 ANTENATAL SCREENING FOR MALFORMATION USING ULTRASONICS: ICD-10-CM

## 2019-06-10 PROCEDURE — 76805 OB US >/= 14 WKS SNGL FETUS: CPT | Mod: S$GLB,,, | Performed by: OBSTETRICS & GYNECOLOGY

## 2019-06-10 PROCEDURE — 76805 PR US, OB 14+WKS, TRANSABD, SINGLE GESTATION: ICD-10-PCS | Mod: S$GLB,,, | Performed by: OBSTETRICS & GYNECOLOGY

## 2019-06-10 PROCEDURE — 99499 UNLISTED E&M SERVICE: CPT | Mod: S$GLB,,, | Performed by: OBSTETRICS & GYNECOLOGY

## 2019-06-10 PROCEDURE — 99499 NO LOS: ICD-10-PCS | Mod: S$GLB,,, | Performed by: OBSTETRICS & GYNECOLOGY

## 2019-06-10 NOTE — PROGRESS NOTES
Obstetrical ultrasound completed today.  See report in imaging section of [facility].

## 2019-06-17 ENCOUNTER — ROUTINE PRENATAL (OUTPATIENT)
Dept: OBSTETRICS AND GYNECOLOGY | Facility: CLINIC | Age: 34
End: 2019-06-17
Payer: COMMERCIAL

## 2019-06-17 VITALS
BODY MASS INDEX: 26.76 KG/M2 | SYSTOLIC BLOOD PRESSURE: 114 MMHG | DIASTOLIC BLOOD PRESSURE: 62 MMHG | WEIGHT: 165.81 LBS

## 2019-06-17 DIAGNOSIS — Z78.9 BREASTFEEDING (INFANT): ICD-10-CM

## 2019-06-17 DIAGNOSIS — Z34.00 SUPERVISION OF NORMAL FIRST PREGNANCY, ANTEPARTUM: Primary | ICD-10-CM

## 2019-06-17 PROCEDURE — 99999 PR PBB SHADOW E&M-EST. PATIENT-LVL III: ICD-10-PCS | Mod: PBBFAC,,, | Performed by: OBSTETRICS & GYNECOLOGY

## 2019-06-17 PROCEDURE — 0502F PR SUBSEQUENT PRENATAL CARE: ICD-10-PCS | Mod: CPTII,S$GLB,, | Performed by: OBSTETRICS & GYNECOLOGY

## 2019-06-17 PROCEDURE — 0502F SUBSEQUENT PRENATAL CARE: CPT | Mod: CPTII,S$GLB,, | Performed by: OBSTETRICS & GYNECOLOGY

## 2019-06-17 PROCEDURE — 99999 PR PBB SHADOW E&M-EST. PATIENT-LVL III: CPT | Mod: PBBFAC,,, | Performed by: OBSTETRICS & GYNECOLOGY

## 2019-06-17 RX ORDER — TRIAMCINOLONE ACETONIDE 1 MG/G
OINTMENT TOPICAL
Qty: 30 G | Refills: 0 | Status: SHIPPED | OUTPATIENT
Start: 2019-06-17 | End: 2019-09-19

## 2019-06-17 NOTE — PROGRESS NOTES
Pt doing well. No vaginal bleeding, no loss of fluid, positive fetal movement, no contractions. Bleeding/labor/rom/fmc precautions. DM screen, CBC next visit. RTC 4 weeks.

## 2019-07-02 ENCOUNTER — PATIENT MESSAGE (OUTPATIENT)
Dept: OBSTETRICS AND GYNECOLOGY | Facility: CLINIC | Age: 34
End: 2019-07-02

## 2019-07-07 ENCOUNTER — PATIENT MESSAGE (OUTPATIENT)
Dept: OBSTETRICS AND GYNECOLOGY | Facility: CLINIC | Age: 34
End: 2019-07-07

## 2019-07-19 ENCOUNTER — LAB VISIT (OUTPATIENT)
Dept: LAB | Facility: HOSPITAL | Age: 34
End: 2019-07-19
Attending: OBSTETRICS & GYNECOLOGY
Payer: COMMERCIAL

## 2019-07-19 ENCOUNTER — ROUTINE PRENATAL (OUTPATIENT)
Dept: OBSTETRICS AND GYNECOLOGY | Facility: CLINIC | Age: 34
End: 2019-07-19
Payer: COMMERCIAL

## 2019-07-19 DIAGNOSIS — Z34.00 SUPERVISION OF NORMAL FIRST PREGNANCY, ANTEPARTUM: ICD-10-CM

## 2019-07-19 DIAGNOSIS — L65.9 HAIR LOSS: ICD-10-CM

## 2019-07-19 DIAGNOSIS — Z34.00 SUPERVISION OF NORMAL FIRST PREGNANCY, ANTEPARTUM: Primary | ICD-10-CM

## 2019-07-19 LAB
BASOPHILS # BLD AUTO: 0.03 K/UL (ref 0–0.2)
BASOPHILS NFR BLD: 0.3 % (ref 0–1.9)
DIFFERENTIAL METHOD: ABNORMAL
EOSINOPHIL # BLD AUTO: 0.1 K/UL (ref 0–0.5)
EOSINOPHIL NFR BLD: 0.8 % (ref 0–8)
ERYTHROCYTE [DISTWIDTH] IN BLOOD BY AUTOMATED COUNT: 12.3 % (ref 11.5–14.5)
GLUCOSE SERPL-MCNC: 92 MG/DL (ref 70–140)
HCT VFR BLD AUTO: 31.5 % (ref 37–48.5)
HGB BLD-MCNC: 10.6 G/DL (ref 12–16)
IMM GRANULOCYTES # BLD AUTO: 0.13 K/UL (ref 0–0.04)
IMM GRANULOCYTES NFR BLD AUTO: 1.5 % (ref 0–0.5)
LYMPHOCYTES # BLD AUTO: 1.4 K/UL (ref 1–4.8)
LYMPHOCYTES NFR BLD: 16.6 % (ref 18–48)
MCH RBC QN AUTO: 31.9 PG (ref 27–31)
MCHC RBC AUTO-ENTMCNC: 33.7 G/DL (ref 32–36)
MCV RBC AUTO: 95 FL (ref 82–98)
MONOCYTES # BLD AUTO: 0.8 K/UL (ref 0.3–1)
MONOCYTES NFR BLD: 8.7 % (ref 4–15)
NEUTROPHILS # BLD AUTO: 6.2 K/UL (ref 1.8–7.7)
NEUTROPHILS NFR BLD: 72.1 % (ref 38–73)
NRBC BLD-RTO: 0 /100 WBC
PLATELET # BLD AUTO: 259 K/UL (ref 150–350)
PMV BLD AUTO: 10.5 FL (ref 9.2–12.9)
RBC # BLD AUTO: 3.32 M/UL (ref 4–5.4)
WBC # BLD AUTO: 8.61 K/UL (ref 3.9–12.7)

## 2019-07-19 PROCEDURE — 99999 PR PBB SHADOW E&M-EST. PATIENT-LVL I: CPT | Mod: PBBFAC,,, | Performed by: OBSTETRICS & GYNECOLOGY

## 2019-07-19 PROCEDURE — 0502F SUBSEQUENT PRENATAL CARE: CPT | Mod: CPTII,S$GLB,, | Performed by: OBSTETRICS & GYNECOLOGY

## 2019-07-19 PROCEDURE — 99999 PR PBB SHADOW E&M-EST. PATIENT-LVL I: ICD-10-PCS | Mod: PBBFAC,,, | Performed by: OBSTETRICS & GYNECOLOGY

## 2019-07-19 PROCEDURE — 0502F PR SUBSEQUENT PRENATAL CARE: ICD-10-PCS | Mod: CPTII,S$GLB,, | Performed by: OBSTETRICS & GYNECOLOGY

## 2019-07-19 PROCEDURE — 85025 COMPLETE CBC W/AUTO DIFF WBC: CPT

## 2019-07-19 PROCEDURE — 36415 COLL VENOUS BLD VENIPUNCTURE: CPT | Mod: PN

## 2019-07-19 PROCEDURE — 82950 GLUCOSE TEST: CPT

## 2019-07-19 NOTE — PROGRESS NOTES
Pt doing well. No vaginal bleeding, no loss of fluid, positive fetal movement, no contractions. Bleeding/labor/rom/fmc precautions.     RTC 4 weeks. Tdap @ 32 weeks with growth scan.

## 2019-07-22 ENCOUNTER — PATIENT MESSAGE (OUTPATIENT)
Dept: OBSTETRICS AND GYNECOLOGY | Facility: CLINIC | Age: 34
End: 2019-07-22

## 2019-07-23 ENCOUNTER — ROUTINE PRENATAL (OUTPATIENT)
Dept: OBSTETRICS AND GYNECOLOGY | Facility: CLINIC | Age: 34
End: 2019-07-23
Payer: COMMERCIAL

## 2019-07-23 VITALS
DIASTOLIC BLOOD PRESSURE: 58 MMHG | BODY MASS INDEX: 27.03 KG/M2 | WEIGHT: 167.44 LBS | SYSTOLIC BLOOD PRESSURE: 100 MMHG

## 2019-07-23 DIAGNOSIS — Z34.92 PRENATAL CARE IN SECOND TRIMESTER: ICD-10-CM

## 2019-07-23 DIAGNOSIS — N89.8 VAGINA ITCHING: Primary | ICD-10-CM

## 2019-07-23 DIAGNOSIS — Z3A.25 25 WEEKS GESTATION OF PREGNANCY: ICD-10-CM

## 2019-07-23 PROCEDURE — 87481 CANDIDA DNA AMP PROBE: CPT | Mod: 59

## 2019-07-23 PROCEDURE — 99999 PR PBB SHADOW E&M-EST. PATIENT-LVL III: ICD-10-PCS | Mod: PBBFAC,,, | Performed by: NURSE PRACTITIONER

## 2019-07-23 PROCEDURE — 99999 PR PBB SHADOW E&M-EST. PATIENT-LVL III: CPT | Mod: PBBFAC,,, | Performed by: NURSE PRACTITIONER

## 2019-07-23 PROCEDURE — 99213 OFFICE O/P EST LOW 20 MIN: CPT | Mod: S$GLB,,, | Performed by: NURSE PRACTITIONER

## 2019-07-23 PROCEDURE — 3008F PR BODY MASS INDEX (BMI) DOCUMENTED: ICD-10-PCS | Mod: CPTII,S$GLB,, | Performed by: NURSE PRACTITIONER

## 2019-07-23 PROCEDURE — 87801 DETECT AGNT MULT DNA AMPLI: CPT

## 2019-07-23 PROCEDURE — 99213 PR OFFICE/OUTPT VISIT, EST, LEVL III, 20-29 MIN: ICD-10-PCS | Mod: S$GLB,,, | Performed by: NURSE PRACTITIONER

## 2019-07-23 PROCEDURE — 3008F BODY MASS INDEX DOCD: CPT | Mod: CPTII,S$GLB,, | Performed by: NURSE PRACTITIONER

## 2019-07-23 RX ORDER — FOLIC ACID 0.4 MG
TABLET ORAL
COMMUNITY
Start: 2018-10-01

## 2019-07-23 RX ORDER — MESALAMINE 1000 MG/1
SUPPOSITORY RECTAL
Refills: 2 | COMMUNITY
Start: 2019-05-09 | End: 2021-04-09

## 2019-07-23 RX ORDER — MESALAMINE 800 MG/1
1600 TABLET, DELAYED RELEASE ORAL 2 TIMES DAILY
Refills: 3 | COMMUNITY
Start: 2019-05-20

## 2019-07-23 NOTE — PROGRESS NOTES
Pt presents [facility] to [facility], 25w3d, for vaginal itching x 1 month. She has been using kenalog cream externally, but over the weekend she reports that the itching was more intense and it was hard for her to sleep. She denies any vaginal discharge, burning or odor. She denies using any scented products in the vaginal area or changing any detergents or hygiene products.   She reports + FM, denies LOF, VB or ctx.   VAGINA: + thin yellow watery discharge, no odor noted.  -affirm pending.  -Reviewed  labor/bleeding precautions and when to call or go to [facility]  -Next appt with Dr. Glover on 2019

## 2019-07-24 ENCOUNTER — PATIENT MESSAGE (OUTPATIENT)
Dept: OBSTETRICS AND GYNECOLOGY | Facility: CLINIC | Age: 34
End: 2019-07-24

## 2019-07-24 LAB
BACTERIAL VAGINOSIS DNA: NEGATIVE
CANDIDA GLABRATA DNA: NEGATIVE
CANDIDA KRUSEI DNA: NEGATIVE
CANDIDA RRNA VAG QL PROBE: POSITIVE
T VAGINALIS RRNA GENITAL QL PROBE: NEGATIVE

## 2019-07-25 DIAGNOSIS — B37.31 YEAST VAGINITIS: Primary | ICD-10-CM

## 2019-07-25 RX ORDER — TERCONAZOLE 4 MG/G
1 CREAM VAGINAL NIGHTLY
Qty: 45 G | Refills: 0 | Status: SHIPPED | OUTPATIENT
Start: 2019-07-25 | End: 2019-09-19

## 2019-07-31 ENCOUNTER — PATIENT MESSAGE (OUTPATIENT)
Dept: OBSTETRICS AND GYNECOLOGY | Facility: CLINIC | Age: 34
End: 2019-07-31

## 2019-08-16 ENCOUNTER — TELEPHONE (OUTPATIENT)
Dept: OBSTETRICS AND GYNECOLOGY | Facility: CLINIC | Age: 34
End: 2019-08-16

## 2019-08-19 ENCOUNTER — ROUTINE PRENATAL (OUTPATIENT)
Dept: OBSTETRICS AND GYNECOLOGY | Facility: CLINIC | Age: 34
End: 2019-08-19
Payer: COMMERCIAL

## 2019-08-19 VITALS
DIASTOLIC BLOOD PRESSURE: 60 MMHG | SYSTOLIC BLOOD PRESSURE: 118 MMHG | BODY MASS INDEX: 27.75 KG/M2 | WEIGHT: 171.94 LBS

## 2019-08-19 DIAGNOSIS — Z23 NEED FOR TDAP VACCINATION: ICD-10-CM

## 2019-08-19 DIAGNOSIS — O26.843 UTERINE SIZE DATE DISCREPANCY PREGNANCY, THIRD TRIMESTER: ICD-10-CM

## 2019-08-19 DIAGNOSIS — Z34.00 SUPERVISION OF NORMAL FIRST PREGNANCY, ANTEPARTUM: Primary | ICD-10-CM

## 2019-08-19 PROCEDURE — 99999 PR PBB SHADOW E&M-EST. PATIENT-LVL II: CPT | Mod: PBBFAC,,, | Performed by: OBSTETRICS & GYNECOLOGY

## 2019-08-19 PROCEDURE — 0502F SUBSEQUENT PRENATAL CARE: CPT | Mod: CPTII,S$GLB,, | Performed by: OBSTETRICS & GYNECOLOGY

## 2019-08-19 PROCEDURE — 0502F PR SUBSEQUENT PRENATAL CARE: ICD-10-PCS | Mod: CPTII,S$GLB,, | Performed by: OBSTETRICS & GYNECOLOGY

## 2019-08-19 PROCEDURE — 99999 PR PBB SHADOW E&M-EST. PATIENT-LVL II: ICD-10-PCS | Mod: PBBFAC,,, | Performed by: OBSTETRICS & GYNECOLOGY

## 2019-08-19 NOTE — PROGRESS NOTES
Pt doing well. No vaginal bleeding, no loss of fluid, positive fetal movement, no contractions. Bleeding/labor/rom/fmc precautions. FH = 33. Will send for growth sono. Tdap with next visit on 9/19.

## 2019-09-05 ENCOUNTER — PATIENT MESSAGE (OUTPATIENT)
Dept: OBSTETRICS AND GYNECOLOGY | Facility: CLINIC | Age: 34
End: 2019-09-05

## 2019-09-19 ENCOUNTER — PROCEDURE VISIT (OUTPATIENT)
Dept: MATERNAL FETAL MEDICINE | Facility: CLINIC | Age: 34
End: 2019-09-19
Payer: COMMERCIAL

## 2019-09-19 ENCOUNTER — CLINICAL SUPPORT (OUTPATIENT)
Dept: OBSTETRICS AND GYNECOLOGY | Facility: CLINIC | Age: 34
End: 2019-09-19
Payer: COMMERCIAL

## 2019-09-19 ENCOUNTER — OFFICE VISIT (OUTPATIENT)
Dept: MATERNAL FETAL MEDICINE | Facility: CLINIC | Age: 34
End: 2019-09-19
Payer: COMMERCIAL

## 2019-09-19 DIAGNOSIS — O26.843 SIGNIFICANT DISCREPANCY BETWEEN UTERINE SIZE AND CLINICAL DATES, ANTEPARTUM, THIRD TRIMESTER: ICD-10-CM

## 2019-09-19 DIAGNOSIS — O40.9XX0 AFI (AMNIOTIC FLUID INDEX) INCREASED: ICD-10-CM

## 2019-09-19 DIAGNOSIS — Z23 FLU VACCINE NEED: Primary | ICD-10-CM

## 2019-09-19 DIAGNOSIS — O26.843 UTERINE SIZE DATE DISCREPANCY PREGNANCY, THIRD TRIMESTER: ICD-10-CM

## 2019-09-19 DIAGNOSIS — Z23 NEED FOR TDAP VACCINATION: ICD-10-CM

## 2019-09-19 DIAGNOSIS — O40.3XX0 POLYHYDRAMNIOS AFFECTING PREGNANCY IN THIRD TRIMESTER: ICD-10-CM

## 2019-09-19 DIAGNOSIS — Z36.89 ENCOUNTER FOR ULTRASOUND TO CHECK FETAL GROWTH: Primary | ICD-10-CM

## 2019-09-19 PROCEDURE — 90472 TDAP VACCINE GREATER THAN OR EQUAL TO 7YO IM: ICD-10-PCS | Mod: S$GLB,,, | Performed by: OBSTETRICS & GYNECOLOGY

## 2019-09-19 PROCEDURE — 99212 PR OFFICE/OUTPT VISIT, EST, LEVL II, 10-19 MIN: ICD-10-PCS | Mod: 25,S$GLB,, | Performed by: OBSTETRICS & GYNECOLOGY

## 2019-09-19 PROCEDURE — 90471 FLU VACCINE (QUAD) GREATER THAN OR EQUAL TO 3YO PRESERVATIVE FREE IM: ICD-10-PCS | Mod: S$GLB,,, | Performed by: OBSTETRICS & GYNECOLOGY

## 2019-09-19 PROCEDURE — 99999 PR PBB SHADOW E&M-EST. PATIENT-LVL I: ICD-10-PCS | Mod: PBBFAC,,,

## 2019-09-19 PROCEDURE — 90472 IMMUNIZATION ADMIN EACH ADD: CPT | Mod: S$GLB,,, | Performed by: OBSTETRICS & GYNECOLOGY

## 2019-09-19 PROCEDURE — 90715 TDAP VACCINE 7 YRS/> IM: CPT | Mod: S$GLB,,, | Performed by: OBSTETRICS & GYNECOLOGY

## 2019-09-19 PROCEDURE — 90686 FLU VACCINE (QUAD) GREATER THAN OR EQUAL TO 3YO PRESERVATIVE FREE IM: ICD-10-PCS | Mod: S$GLB,,, | Performed by: OBSTETRICS & GYNECOLOGY

## 2019-09-19 PROCEDURE — 99999 PR PBB SHADOW E&M-EST. PATIENT-LVL I: CPT | Mod: PBBFAC,,,

## 2019-09-19 PROCEDURE — 90686 IIV4 VACC NO PRSV 0.5 ML IM: CPT | Mod: S$GLB,,, | Performed by: OBSTETRICS & GYNECOLOGY

## 2019-09-19 PROCEDURE — 90715 TDAP VACCINE GREATER THAN OR EQUAL TO 7YO IM: ICD-10-PCS | Mod: S$GLB,,, | Performed by: OBSTETRICS & GYNECOLOGY

## 2019-09-19 PROCEDURE — 76816 PR  US,PREGNANT UTERUS,F/U,TRANSABD APP: ICD-10-PCS | Mod: S$GLB,,, | Performed by: OBSTETRICS & GYNECOLOGY

## 2019-09-19 PROCEDURE — 99212 OFFICE O/P EST SF 10 MIN: CPT | Mod: 25,S$GLB,, | Performed by: OBSTETRICS & GYNECOLOGY

## 2019-09-19 PROCEDURE — 90471 IMMUNIZATION ADMIN: CPT | Mod: S$GLB,,, | Performed by: OBSTETRICS & GYNECOLOGY

## 2019-09-19 PROCEDURE — 76816 OB US FOLLOW-UP PER FETUS: CPT | Mod: S$GLB,,, | Performed by: OBSTETRICS & GYNECOLOGY

## 2019-09-19 NOTE — PROGRESS NOTES
Indication  ========    Follow-up evaluation for fetal growth, S > D    History  ======    Previous Outcomes   2  Para 0  Abortions (A) 1  Miscarriages 1    Pregnancy History  ==============    Maternal Lab Tests  Test: AFP  Result of other maternal screening test: negative    Fetal Growth Overview  =================    Exam date        GA              BPD (mm)         HC (mm)        AC (mm)        FL (mm)         HL (mm)        EFW (g)  06/10/2019        19w 2d        44.7                  164.5             139.2             29.4              29.4               280  2019        33w 5d        87.1                  317.0             298.3             65.3                                   2,345    32%      Method  ======    Transabdominal ultrasound examination. 2D Color Doppler, Voluson E10. View: Good view    Pregnancy  =========    Davis pregnancy. Number of fetuses: 1    Dating  ======    LMP on: 2019  Cycle: regular cycle  GA by LMP 33 w + 5 d  MONA by LMP: 2019  Ultrasound examination on: 2019  GA by U/S based upon: AC, BPD, Femur, HC  GA by U/S 34 w + 4 d  MONA by U/S: 10/27/2019  Assigned: Dating performed on 2019, based on the LMP  Assigned GA 33 w + 5 d  Assigned MONA: 2019  Pregnancy length 280 d    General Evaluation  ==============    Cardiac activity present.  bpm.  Fetal movements visualized.  Presentation cephalic .  Placenta anterior.  Umbilical cord 3 vessel cord.  Amniotic fluid Amount of AF: normal amount. MVP 9.3 cm. PAMELA 24.9 cm. Q1 5.7 cm, Q2 5.8 cm, Q3 7.5 cm, Q4 5.9 cm.    Fetal Biometry  ============    Standard  BPD 87.1 mm  35w 1d                Hadlock    .6 mm  37w 4d                Jarrett    .0 mm  35w 4d                Hadlock    .3 mm  33w 6d                Hadlock    Femur 65.3 mm  33w 5d                Hadlock    HC / AC 1.06    EFW 2,345 g          32%        Javier    EFW (lb) 5 lb  EFW (oz) 3 oz  EFW by: Iliana  (BPD-HC-AC-FL)  Head / Face / Neck  Cephalic index 0.78    Extremities / Bony Struc  FL / BPD 0.75    FL / AC 0.22    Other Structures   bpm    Fetal Anatomy  ============    Cranium: normal  Stomach: normal  Kidneys: normal  Bladder: normal  Wants to know gender: no    Consultation  ==========    Type: Polyhydramnios  Polyhydramnios can be seen in conditions including but not limited to idiopathic causes and diabetes (common reasons) and also in cases of  infections, chromosomal abnormalities, genetic disorders, structural malformations, and neuromuscular conditions. Patient aware often  idiopathic. Declined genetic screening for aneuploidy during pregnancy.  Patient was also advised of risks of PPROM/PTL/adverse outcomes with polyhydramnios.  Glucose screen in July was 92. Recommend primary ob consider repeating GDM screen. Recommend follow up in 4 weeks for growth.   fetal surveillance is not warranted unless PAMELA is 30cm or greater or if other indications for testing.    10 minutes was seen in face to face time with greater than half of that time spent in counseling and coordination of care.      Impression  =========    1. Davis live IUP at 33w5d  2. Normal interval fetal growth, EFW 2345g in the 32nd%  3. Mild polyhydramnios, PAMELA 24-26cm  4. Limited fetal anatomy appears normal.    Recommendation  ==============    Recommend primary OB consider repeating GDM screen  We will see the patient back in 4 weeks for growth and to evaluate amniotic fluid   labor precautions given    Message sent to primary OB provider

## 2019-09-19 NOTE — PROGRESS NOTES
Here for TDAP injection. Patient without complaint of pain at this time ,Injection given. Tolerated well. No pain noted after injection.  Advised to wait in lobby 15 minutes and report any adverse reactions.         Site:[facility]    Baby Friendly Handout Given to Patient      Here for Influenza - Quadrivalent - PF (ADULT) vaccine . Vaccine Information sheet given to patient. Patient without complaint of pain prior to or after injection. Immunization tolerated well and patient advised to wait in lobby 15 minutes. Report any adverse reactions.      Site:[facility]

## 2019-09-20 ENCOUNTER — PATIENT MESSAGE (OUTPATIENT)
Dept: OBSTETRICS AND GYNECOLOGY | Facility: CLINIC | Age: 34
End: 2019-09-20

## 2019-09-23 ENCOUNTER — PATIENT MESSAGE (OUTPATIENT)
Dept: OBSTETRICS AND GYNECOLOGY | Facility: CLINIC | Age: 34
End: 2019-09-23

## 2019-09-23 DIAGNOSIS — O40.3XX0 POLYHYDRAMNIOS, THIRD TRIMESTER, SINGLE GESTATION: Primary | ICD-10-CM

## 2019-09-23 NOTE — TELEPHONE ENCOUNTER
Janet,     Schedule her for tomorrow morning at 8:00 am at [facility] for 1 hour glucola.

## 2019-09-24 ENCOUNTER — LAB VISIT (OUTPATIENT)
Dept: LAB | Facility: HOSPITAL | Age: 34
End: 2019-09-24
Attending: OBSTETRICS & GYNECOLOGY
Payer: COMMERCIAL

## 2019-09-24 DIAGNOSIS — O40.3XX0 POLYHYDRAMNIOS, THIRD TRIMESTER, SINGLE GESTATION: ICD-10-CM

## 2019-09-24 LAB — GLUCOSE SERPL-MCNC: 88 MG/DL (ref 70–140)

## 2019-09-24 PROCEDURE — 36415 COLL VENOUS BLD VENIPUNCTURE: CPT | Mod: PN

## 2019-09-24 PROCEDURE — 82950 GLUCOSE TEST: CPT

## 2019-09-27 ENCOUNTER — PATIENT MESSAGE (OUTPATIENT)
Dept: OBSTETRICS AND GYNECOLOGY | Facility: CLINIC | Age: 34
End: 2019-09-27

## 2019-10-03 ENCOUNTER — LAB VISIT (OUTPATIENT)
Dept: LAB | Facility: OTHER | Age: 34
End: 2019-10-03
Attending: OBSTETRICS & GYNECOLOGY
Payer: COMMERCIAL

## 2019-10-03 ENCOUNTER — ROUTINE PRENATAL (OUTPATIENT)
Dept: OBSTETRICS AND GYNECOLOGY | Facility: CLINIC | Age: 34
End: 2019-10-03
Payer: COMMERCIAL

## 2019-10-03 VITALS
WEIGHT: 176.81 LBS | SYSTOLIC BLOOD PRESSURE: 108 MMHG | BODY MASS INDEX: 28.54 KG/M2 | DIASTOLIC BLOOD PRESSURE: 62 MMHG

## 2019-10-03 DIAGNOSIS — Z34.00 SUPERVISION OF NORMAL FIRST PREGNANCY, ANTEPARTUM: Primary | ICD-10-CM

## 2019-10-03 DIAGNOSIS — Z34.00 SUPERVISION OF NORMAL FIRST PREGNANCY, ANTEPARTUM: ICD-10-CM

## 2019-10-03 LAB
BASOPHILS # BLD AUTO: 0.04 K/UL (ref 0–0.2)
BASOPHILS NFR BLD: 0.4 % (ref 0–1.9)
DIFFERENTIAL METHOD: ABNORMAL
EOSINOPHIL # BLD AUTO: 0.1 K/UL (ref 0–0.5)
EOSINOPHIL NFR BLD: 0.6 % (ref 0–8)
ERYTHROCYTE [DISTWIDTH] IN BLOOD BY AUTOMATED COUNT: 12.8 % (ref 11.5–14.5)
HCT VFR BLD AUTO: 31.5 % (ref 37–48.5)
HGB BLD-MCNC: 10.8 G/DL (ref 12–16)
IMM GRANULOCYTES # BLD AUTO: 0.19 K/UL (ref 0–0.04)
IMM GRANULOCYTES NFR BLD AUTO: 1.8 % (ref 0–0.5)
LYMPHOCYTES # BLD AUTO: 1.6 K/UL (ref 1–4.8)
LYMPHOCYTES NFR BLD: 15 % (ref 18–48)
MCH RBC QN AUTO: 32 PG (ref 27–31)
MCHC RBC AUTO-ENTMCNC: 34.3 G/DL (ref 32–36)
MCV RBC AUTO: 94 FL (ref 82–98)
MONOCYTES # BLD AUTO: 0.9 K/UL (ref 0.3–1)
MONOCYTES NFR BLD: 8.3 % (ref 4–15)
NEUTROPHILS # BLD AUTO: 7.8 K/UL (ref 1.8–7.7)
NEUTROPHILS NFR BLD: 73.9 % (ref 38–73)
NRBC BLD-RTO: 0 /100 WBC
PLATELET # BLD AUTO: 251 K/UL (ref 150–350)
PMV BLD AUTO: 10.2 FL (ref 9.2–12.9)
RBC # BLD AUTO: 3.37 M/UL (ref 4–5.4)
WBC # BLD AUTO: 10.58 K/UL (ref 3.9–12.7)

## 2019-10-03 PROCEDURE — 99999 PR PBB SHADOW E&M-EST. PATIENT-LVL II: CPT | Mod: PBBFAC,,, | Performed by: OBSTETRICS & GYNECOLOGY

## 2019-10-03 PROCEDURE — 36415 COLL VENOUS BLD VENIPUNCTURE: CPT

## 2019-10-03 PROCEDURE — 0502F SUBSEQUENT PRENATAL CARE: CPT | Mod: CPTII,S$GLB,, | Performed by: OBSTETRICS & GYNECOLOGY

## 2019-10-03 PROCEDURE — 86592 SYPHILIS TEST NON-TREP QUAL: CPT

## 2019-10-03 PROCEDURE — 86703 HIV-1/HIV-2 1 RESULT ANTBDY: CPT

## 2019-10-03 PROCEDURE — 85025 COMPLETE CBC W/AUTO DIFF WBC: CPT

## 2019-10-03 PROCEDURE — 0502F PR SUBSEQUENT PRENATAL CARE: ICD-10-PCS | Mod: CPTII,S$GLB,, | Performed by: OBSTETRICS & GYNECOLOGY

## 2019-10-03 PROCEDURE — 87081 CULTURE SCREEN ONLY: CPT

## 2019-10-03 PROCEDURE — 99999 PR PBB SHADOW E&M-EST. PATIENT-LVL II: ICD-10-PCS | Mod: PBBFAC,,, | Performed by: OBSTETRICS & GYNECOLOGY

## 2019-10-04 LAB
HIV 1+2 AB+HIV1 P24 AG SERPL QL IA: NEGATIVE
RPR SER QL: NORMAL

## 2019-10-05 LAB — BACTERIA SPEC AEROBE CULT: NORMAL

## 2019-10-14 ENCOUNTER — TELEPHONE (OUTPATIENT)
Dept: OBSTETRICS AND GYNECOLOGY | Facility: CLINIC | Age: 34
End: 2019-10-14

## 2019-10-14 ENCOUNTER — PATIENT MESSAGE (OUTPATIENT)
Dept: OBSTETRICS AND GYNECOLOGY | Facility: CLINIC | Age: 34
End: 2019-10-14

## 2019-10-14 ENCOUNTER — ROUTINE PRENATAL (OUTPATIENT)
Dept: OBSTETRICS AND GYNECOLOGY | Facility: CLINIC | Age: 34
End: 2019-10-14
Payer: COMMERCIAL

## 2019-10-14 VITALS
WEIGHT: 179.88 LBS | BODY MASS INDEX: 29.04 KG/M2 | DIASTOLIC BLOOD PRESSURE: 92 MMHG | SYSTOLIC BLOOD PRESSURE: 156 MMHG

## 2019-10-14 DIAGNOSIS — Z3A.37 37 WEEKS GESTATION OF PREGNANCY: Primary | ICD-10-CM

## 2019-10-14 DIAGNOSIS — N89.8 VAGINAL DISCHARGE DURING PREGNANCY IN THIRD TRIMESTER: ICD-10-CM

## 2019-10-14 DIAGNOSIS — O26.893 VAGINAL DISCHARGE DURING PREGNANCY IN THIRD TRIMESTER: ICD-10-CM

## 2019-10-14 PROCEDURE — 0502F SUBSEQUENT PRENATAL CARE: CPT | Mod: CPTII,S$GLB,, | Performed by: NURSE PRACTITIONER

## 2019-10-14 PROCEDURE — 99999 PR PBB SHADOW E&M-EST. PATIENT-LVL I: CPT | Mod: PBBFAC,,, | Performed by: NURSE PRACTITIONER

## 2019-10-14 PROCEDURE — 0502F PR SUBSEQUENT PRENATAL CARE: ICD-10-PCS | Mod: CPTII,S$GLB,, | Performed by: NURSE PRACTITIONER

## 2019-10-14 PROCEDURE — 99999 PR PBB SHADOW E&M-EST. PATIENT-LVL I: ICD-10-PCS | Mod: PBBFAC,,, | Performed by: NURSE PRACTITIONER

## 2019-10-14 NOTE — PROGRESS NOTES
Here for routine OB appt at 37w2d, with complaints of ?LOF.  Reports good FM.  Denies VB, denies regular contractions reports some sporadic BH. SSE with some watery discharge noted but negative pooling and valsalva. Reviewed warning signs of Labor and Preeclampsia.  Daily FM counts reinforced.  F/U scheduled 1 weeks    Repeat /84. Pt denies any headaches, visual disturbances, RUQ abdominal pain, or swelling. Urine negative. Trace ankle edema noted bilaterally. Discussed with Dr. Glover- follow-up as scheduled. Strict pre-e precautions discussed.

## 2019-10-18 ENCOUNTER — ROUTINE PRENATAL (OUTPATIENT)
Dept: OBSTETRICS AND GYNECOLOGY | Facility: CLINIC | Age: 34
End: 2019-10-18
Payer: COMMERCIAL

## 2019-10-18 VITALS
BODY MASS INDEX: 29.39 KG/M2 | SYSTOLIC BLOOD PRESSURE: 120 MMHG | WEIGHT: 182.13 LBS | DIASTOLIC BLOOD PRESSURE: 78 MMHG

## 2019-10-18 DIAGNOSIS — O48.0 41 WEEKS GESTATION OF PREGNANCY: ICD-10-CM

## 2019-10-18 DIAGNOSIS — Z34.00 SUPERVISION OF NORMAL FIRST PREGNANCY, ANTEPARTUM: Primary | ICD-10-CM

## 2019-10-18 DIAGNOSIS — Z3A.41 41 WEEKS GESTATION OF PREGNANCY: ICD-10-CM

## 2019-10-18 PROCEDURE — 0502F SUBSEQUENT PRENATAL CARE: CPT | Mod: CPTII,S$GLB,, | Performed by: OBSTETRICS & GYNECOLOGY

## 2019-10-18 PROCEDURE — 99999 PR PBB SHADOW E&M-EST. PATIENT-LVL II: ICD-10-PCS | Mod: PBBFAC,,, | Performed by: OBSTETRICS & GYNECOLOGY

## 2019-10-18 PROCEDURE — 0502F PR SUBSEQUENT PRENATAL CARE: ICD-10-PCS | Mod: CPTII,S$GLB,, | Performed by: OBSTETRICS & GYNECOLOGY

## 2019-10-18 PROCEDURE — 99999 PR PBB SHADOW E&M-EST. PATIENT-LVL II: CPT | Mod: PBBFAC,,, | Performed by: OBSTETRICS & GYNECOLOGY

## 2019-10-18 NOTE — PROGRESS NOTES
Pt doing well. No vaginal bleeding, no loss of fluid, positive fetal movement, no contractions. Bleeding/labor/rom/fmc precautions.     Induction scheduled for 11/12/19. Will need PNT starting on 11/7/19. RTC 1 week. Has fluid check with MFM on Tuesday.

## 2019-10-22 ENCOUNTER — PATIENT MESSAGE (OUTPATIENT)
Dept: OBSTETRICS AND GYNECOLOGY | Facility: CLINIC | Age: 34
End: 2019-10-22

## 2019-10-22 ENCOUNTER — PROCEDURE VISIT (OUTPATIENT)
Dept: MATERNAL FETAL MEDICINE | Facility: CLINIC | Age: 34
End: 2019-10-22
Payer: COMMERCIAL

## 2019-10-22 DIAGNOSIS — O40.9XX0 AFI (AMNIOTIC FLUID INDEX) INCREASED: ICD-10-CM

## 2019-10-22 DIAGNOSIS — Z36.89 ENCOUNTER FOR ULTRASOUND TO CHECK FETAL GROWTH: ICD-10-CM

## 2019-10-22 PROCEDURE — 76816 OB US FOLLOW-UP PER FETUS: CPT | Mod: S$GLB,,, | Performed by: OBSTETRICS & GYNECOLOGY

## 2019-10-22 PROCEDURE — 76816 PR  US,PREGNANT UTERUS,F/U,TRANSABD APP: ICD-10-PCS | Mod: S$GLB,,, | Performed by: OBSTETRICS & GYNECOLOGY

## 2019-10-24 ENCOUNTER — ROUTINE PRENATAL (OUTPATIENT)
Dept: OBSTETRICS AND GYNECOLOGY | Facility: CLINIC | Age: 34
End: 2019-10-24
Payer: COMMERCIAL

## 2019-10-24 VITALS
DIASTOLIC BLOOD PRESSURE: 64 MMHG | WEIGHT: 182.56 LBS | SYSTOLIC BLOOD PRESSURE: 120 MMHG | BODY MASS INDEX: 29.46 KG/M2

## 2019-10-24 DIAGNOSIS — Z34.00 SUPERVISION OF NORMAL FIRST PREGNANCY, ANTEPARTUM: Primary | ICD-10-CM

## 2019-10-24 PROCEDURE — 99999 PR PBB SHADOW E&M-EST. PATIENT-LVL III: CPT | Mod: PBBFAC,,, | Performed by: OBSTETRICS & GYNECOLOGY

## 2019-10-24 PROCEDURE — 0502F SUBSEQUENT PRENATAL CARE: CPT | Mod: CPTII,S$GLB,, | Performed by: OBSTETRICS & GYNECOLOGY

## 2019-10-24 PROCEDURE — 99999 PR PBB SHADOW E&M-EST. PATIENT-LVL III: ICD-10-PCS | Mod: PBBFAC,,, | Performed by: OBSTETRICS & GYNECOLOGY

## 2019-10-24 PROCEDURE — 0502F PR SUBSEQUENT PRENATAL CARE: ICD-10-PCS | Mod: CPTII,S$GLB,, | Performed by: OBSTETRICS & GYNECOLOGY

## 2019-10-24 NOTE — PROGRESS NOTES
Pt doing well. No vaginal bleeding, no loss of fluid, positive fetal movement, no contractions. Bleeding/labor/rom/fmc precautions.     Induction 11/12/19 if undelivered. Will start PNT after 40 weeks.

## 2019-10-29 ENCOUNTER — ROUTINE PRENATAL (OUTPATIENT)
Dept: OBSTETRICS AND GYNECOLOGY | Facility: CLINIC | Age: 34
End: 2019-10-29
Payer: COMMERCIAL

## 2019-10-29 VITALS
DIASTOLIC BLOOD PRESSURE: 60 MMHG | WEIGHT: 180.56 LBS | BODY MASS INDEX: 29.14 KG/M2 | SYSTOLIC BLOOD PRESSURE: 116 MMHG

## 2019-10-29 DIAGNOSIS — O48.0 POST TERM PREGNANCY AT 41 WEEKS GESTATION: ICD-10-CM

## 2019-10-29 DIAGNOSIS — Z3A.41 POST TERM PREGNANCY AT 41 WEEKS GESTATION: ICD-10-CM

## 2019-10-29 DIAGNOSIS — Z34.00 SUPERVISION OF NORMAL FIRST PREGNANCY, ANTEPARTUM: Primary | ICD-10-CM

## 2019-10-29 PROCEDURE — 0502F PR SUBSEQUENT PRENATAL CARE: ICD-10-PCS | Mod: CPTII,S$GLB,, | Performed by: OBSTETRICS & GYNECOLOGY

## 2019-10-29 PROCEDURE — 0502F SUBSEQUENT PRENATAL CARE: CPT | Mod: CPTII,S$GLB,, | Performed by: OBSTETRICS & GYNECOLOGY

## 2019-10-29 PROCEDURE — 99999 PR PBB SHADOW E&M-EST. PATIENT-LVL II: ICD-10-PCS | Mod: PBBFAC,,, | Performed by: OBSTETRICS & GYNECOLOGY

## 2019-10-29 PROCEDURE — 99999 PR PBB SHADOW E&M-EST. PATIENT-LVL II: CPT | Mod: PBBFAC,,, | Performed by: OBSTETRICS & GYNECOLOGY

## 2019-10-29 NOTE — PROGRESS NOTES
Pt doing well. No vaginal bleeding, no loss of fluid, positive fetal movement, no contractions. Bleeding/labor/rom/fmc precautions.      here with her today. Declined cervical check today. RTC 1 week. BPP next Friday prior to induction on Tuesday 11/12.

## 2019-10-31 ENCOUNTER — HOSPITAL ENCOUNTER (INPATIENT)
Facility: OTHER | Age: 34
LOS: 3 days | Discharge: HOME | End: 2019-11-03
Attending: OBSTETRICS & GYNECOLOGY | Admitting: OBSTETRICS & GYNECOLOGY
Payer: COMMERCIAL

## 2019-10-31 ENCOUNTER — ANESTHESIA (OUTPATIENT)
Dept: OBSTETRICS AND GYNECOLOGY | Facility: OTHER | Age: 34
End: 2019-10-31
Payer: COMMERCIAL

## 2019-10-31 ENCOUNTER — ANESTHESIA EVENT (OUTPATIENT)
Dept: OBSTETRICS AND GYNECOLOGY | Facility: OTHER | Age: 34
End: 2019-10-31
Payer: COMMERCIAL

## 2019-10-31 DIAGNOSIS — Z37.9 NORMAL LABOR: ICD-10-CM

## 2019-10-31 LAB
ABO + RH BLD: NORMAL
BASOPHILS # BLD AUTO: 0.05 K/UL (ref 0–0.2)
BASOPHILS NFR BLD: 0.3 % (ref 0–1.9)
BLD GP AB SCN CELLS X3 SERPL QL: NORMAL
DIFFERENTIAL METHOD: ABNORMAL
EOSINOPHIL # BLD AUTO: 0.1 K/UL (ref 0–0.5)
EOSINOPHIL NFR BLD: 0.3 % (ref 0–8)
ERYTHROCYTE [DISTWIDTH] IN BLOOD BY AUTOMATED COUNT: 12.6 % (ref 11.5–14.5)
HCT VFR BLD AUTO: 35.6 % (ref 37–48.5)
HGB BLD-MCNC: 12 G/DL (ref 12–16)
IMM GRANULOCYTES # BLD AUTO: 0.23 K/UL (ref 0–0.04)
IMM GRANULOCYTES NFR BLD AUTO: 1.4 % (ref 0–0.5)
LYMPHOCYTES # BLD AUTO: 1.8 K/UL (ref 1–4.8)
LYMPHOCYTES NFR BLD: 11 % (ref 18–48)
MCH RBC QN AUTO: 31.1 PG (ref 27–31)
MCHC RBC AUTO-ENTMCNC: 33.7 G/DL (ref 32–36)
MCV RBC AUTO: 92 FL (ref 82–98)
MONOCYTES # BLD AUTO: 1.4 K/UL (ref 0.3–1)
MONOCYTES NFR BLD: 8.4 % (ref 4–15)
NEUTROPHILS # BLD AUTO: 12.7 K/UL (ref 1.8–7.7)
NEUTROPHILS NFR BLD: 78.6 % (ref 38–73)
NRBC BLD-RTO: 0 /100 WBC
PLATELET # BLD AUTO: 243 K/UL (ref 150–350)
PMV BLD AUTO: 11 FL (ref 9.2–12.9)
RBC # BLD AUTO: 3.86 M/UL (ref 4–5.4)
WBC # BLD AUTO: 16.19 K/UL (ref 3.9–12.7)

## 2019-10-31 PROCEDURE — 59025 PR FETAL 2N-STRESS TEST: ICD-10-PCS | Mod: 26,,, | Performed by: OBSTETRICS & GYNECOLOGY

## 2019-10-31 PROCEDURE — 11000001 HC ACUTE MED/SURG PRIVATE ROOM

## 2019-10-31 PROCEDURE — 99283 PR EMERGENCY DEPT VISIT,LEVEL III: ICD-10-PCS | Mod: 25,,, | Performed by: OBSTETRICS & GYNECOLOGY

## 2019-10-31 PROCEDURE — 99285 EMERGENCY DEPT VISIT HI MDM: CPT | Mod: 25

## 2019-10-31 PROCEDURE — 86901 BLOOD TYPING SEROLOGIC RH(D): CPT

## 2019-10-31 PROCEDURE — 99283 EMERGENCY DEPT VISIT LOW MDM: CPT | Mod: 25,,, | Performed by: OBSTETRICS & GYNECOLOGY

## 2019-10-31 PROCEDURE — 59025 FETAL NON-STRESS TEST: CPT

## 2019-10-31 PROCEDURE — 85025 COMPLETE CBC W/AUTO DIFF WBC: CPT

## 2019-10-31 PROCEDURE — 59025 FETAL NON-STRESS TEST: CPT | Mod: 26,,, | Performed by: OBSTETRICS & GYNECOLOGY

## 2019-10-31 RX ORDER — CEFAZOLIN SODIUM 2 G/50ML
2 SOLUTION INTRAVENOUS ONCE AS NEEDED
Status: DISCONTINUED | OUTPATIENT
Start: 2019-10-31 | End: 2019-11-03 | Stop reason: HOSPADM

## 2019-10-31 RX ORDER — MESALAMINE 400 MG/1
1600 CAPSULE, DELAYED RELEASE ORAL 2 TIMES DAILY
Status: DISCONTINUED | OUTPATIENT
Start: 2019-10-31 | End: 2019-11-03 | Stop reason: HOSPADM

## 2019-10-31 RX ORDER — OXYTOCIN/RINGER'S LACTATE 30/500 ML
334 PLASTIC BAG, INJECTION (ML) INTRAVENOUS ONCE
Status: DISCONTINUED | OUTPATIENT
Start: 2019-10-31 | End: 2019-11-01

## 2019-10-31 RX ORDER — FENTANYL/BUPIVACAINE/NS/PF 2MCG/ML-.1
PLASTIC BAG, INJECTION (ML) INJECTION
Status: DISPENSED
Start: 2019-10-31 | End: 2019-11-01

## 2019-10-31 RX ORDER — ONDANSETRON 8 MG/1
8 TABLET, ORALLY DISINTEGRATING ORAL EVERY 8 HOURS PRN
Status: DISCONTINUED | OUTPATIENT
Start: 2019-10-31 | End: 2019-11-03 | Stop reason: HOSPADM

## 2019-10-31 RX ORDER — SODIUM CHLORIDE, SODIUM LACTATE, POTASSIUM CHLORIDE, CALCIUM CHLORIDE 600; 310; 30; 20 MG/100ML; MG/100ML; MG/100ML; MG/100ML
INJECTION, SOLUTION INTRAVENOUS CONTINUOUS
Status: DISCONTINUED | OUTPATIENT
Start: 2019-10-31 | End: 2019-11-03 | Stop reason: HOSPADM

## 2019-10-31 RX ORDER — OXYTOCIN/RINGER'S LACTATE 30/500 ML
95 PLASTIC BAG, INJECTION (ML) INTRAVENOUS ONCE
Status: DISCONTINUED | OUTPATIENT
Start: 2019-10-31 | End: 2019-11-03 | Stop reason: HOSPADM

## 2019-10-31 RX ORDER — SIMETHICONE 80 MG
1 TABLET,CHEWABLE ORAL 4 TIMES DAILY PRN
Status: DISCONTINUED | OUTPATIENT
Start: 2019-10-31 | End: 2019-11-03 | Stop reason: HOSPADM

## 2019-10-31 RX ORDER — SODIUM CHLORIDE 9 MG/ML
INJECTION, SOLUTION INTRAVENOUS
Status: DISCONTINUED | OUTPATIENT
Start: 2019-10-31 | End: 2019-11-03 | Stop reason: HOSPADM

## 2019-10-31 RX ORDER — CALCIUM CARBONATE 200(500)MG
500 TABLET,CHEWABLE ORAL 3 TIMES DAILY PRN
Status: DISCONTINUED | OUTPATIENT
Start: 2019-10-31 | End: 2019-11-03 | Stop reason: HOSPADM

## 2019-10-31 NOTE — PROGRESS NOTES
LABOR PROGRESS NOTE    S:  MD to bedside for cervical check. Complaints: None.  Feeling pain with contractions.      O: Temp:  [97.3 °F (36.3 °C)-98.7 °F (37.1 °C)] 98 °F (36.7 °C)  Pulse:  [] 74  Resp:  [18] 18  SpO2:  [91 %-98 %] 97 %  BP: ()/(56-87) 99/56    FHT: 145  BPM + accels, moderate variability, - decels  CAT 1  Intermittent auscultation    Dilation (cm): 5  Effacement (%): 90  Station: -2     ASSESSMENT:   34 y.o.  at 39w5d, no change.   FHT reassuring  Cat 1    Pt still declining AROM. Wants to get in the tub at this time.    TIMELINE  0245: /-3  0500: /-2  1030: /-2, declined AROM  1800 /-2, declined AROM    PLAN:  1. Labor management  -Continue intermittent Maternal/Fetal Monitoring if tolerated   -Recheck 2-4 hours or PRN and AROM if no change    Ronny Ashton MD  PGY3, OBGYN [facility]

## 2019-10-31 NOTE — ANESTHESIA PREPROCEDURE EVALUATION
Alisa Herrera is a 34 y.o. female  @  39w5d presenting with contractions for 24 hrs. Pmhx includes ulcerative colitis on mesalamine. IUP otherwise uncomplicated.     OB History    Para Term  AB Living   2 0 0 0 1 0   SAB TAB Ectopic Multiple Live Births   1 0 0 0 0      # Outcome Date GA Lbr De/2nd Weight Sex Delivery Anes PTL Lv   2 Current            1 SAB                Wt Readings from Last 1 Encounters:   10/31/19 0115 82.6 kg (182 lb)       BP Readings from Last 3 Encounters:   10/31/19 121/81   10/29/19 116/60   10/24/19 120/64       Patient Active Problem List   Diagnosis    Colitis    Missed     Status post dilation and curettage    Normal labor       Past Surgical History:   Procedure Laterality Date    APPENDECTOMY      DILATION AND CURETTAGE OF UTERUS USING SUCTION N/A 2018    Procedure: DILATION AND CURETTAGE, UTERUS, USING SUCTION;  Surgeon: Consuelo Glover MD;  [facility];  Service: OB/GYN;  Laterality: N/A;    EAR TUBE REMOVAL Bilateral     FOOT SURGERY Right     WISDOM TOOTH EXTRACTION         Social History     Socioeconomic History    Marital status:      Spouse name: Not on file    Number of children: Not on file    Years of education: Not on file    Highest education level: Not on file   Occupational History    Not on file   Social Needs    Financial resource strain: Not on file    Food insecurity:     Worry: Not on file     Inability: Not on file    Transportation needs:     Medical: Not on file     Non-medical: Not on file   Tobacco Use    Smoking status: Never Smoker    Smokeless tobacco: Never Used   Substance and Sexual Activity    Alcohol use: Not Currently     Comment: socially    Drug use: No    Sexual activity: Yes     Partners: Male     Birth control/protection: None   Lifestyle    Physical activity:     Days per week: Not on file     Minutes per session: Not on file    Stress: Not on file    Relationships    Social connections:     Talks on phone: Not on file     Gets together: Not on file     Attends Anabaptism service: Not on file     Active member of club or organization: Not on file     Attends meetings of clubs or organizations: Not on file     Relationship status: Not on file   Other Topics Concern    Not on file   Social History Narrative    Not on file         Chemistry        Component Value Date/Time     07/23/2018 1941    K 4.0 07/23/2018 1941     07/23/2018 1941    CO2 25 07/23/2018 1941    BUN 6 07/23/2018 1941    CREATININE 0.8 07/23/2018 1941     07/23/2018 1941        Component Value Date/Time    CALCIUM 10.1 07/23/2018 1941    ALKPHOS 79 07/23/2018 1941    AST 19 07/23/2018 1941    ALT 18 07/23/2018 1941    BILITOT 2.1 (H) 07/23/2018 1941    ESTGFRAFRICA >60 07/23/2018 1941    EGFRNONAA >60 07/23/2018 1941            Lab Results   Component Value Date    WBC 16.19 (H) 10/31/2019    HGB 12.0 10/31/2019    HCT 35.6 (L) 10/31/2019    MCV 92 10/31/2019     10/31/2019       No results for input(s): PT, INR, PROTIME, APTT in the last 72 hours.                  Anesthesia Evaluation    I have reviewed the Patient Summary Reports.     I have reviewed the Medications.     Review of Systems  Anesthesia Hx:  No problems with previous Anesthesia    Social:  Non-Smoker    Cardiovascular:   Exercise tolerance: good    Pulmonary:  Pulmonary Normal    Hepatic/GI:   PUD, UC   Endocrine:  Endocrine Normal        Physical Exam  General:  Well nourished    Airway/Jaw/Neck:  Airway Findings: Mouth Opening: Normal Tongue: Normal  General Airway Assessment: Adult  Mallampati: II  TM Distance: Normal, at least 6 cm  Jaw/Neck Findings:     Neck ROM: Normal ROM      Dental:  Dental Findings: In tact             Anesthesia Plan  Type of Anesthesia, risks & benefits discussed:  Anesthesia Type:  CSE, epidural, general, spinal  Patient's Preference:   Intra-op Monitoring Plan: standard  ASA monitors  Intra-op Monitoring Plan Comments:   Post Op Pain Control Plan: per primary service following discharge from PACU  Post Op Pain Control Plan Comments:   Induction:   IV  Beta Blocker:  Patient is not currently on a Beta-Blocker (No further documentation required).       Informed Consent: Patient understands risks and agrees with Anesthesia plan.  Questions answered. Anesthesia consent signed with patient.  ASA Score: 2     Day of Surgery Review of History & Physical:    H&P update referred to the provider.         Ready For Surgery From Anesthesia Perspective.

## 2019-10-31 NOTE — H&P
HISTORY AND PHYSICAL                                                OBSTETRICS        Alisa Herrera is a 34 y.o. [id number] at 39w5d presenting with contractions for past 24 hours. Increased in frequency and intensity 2-3 hours ago. Denies LOF. Does report vaginal spotting. Normal fetal movement. This IUP is complicated by UC (on mesalamine.)              Review of patient's allergies indicates:   Allergen Reactions    Cimzia [certolizumab pegol]         Lupus like reactions           Past Medical History:   Diagnosis Date    Crohn disease      Ulcerative colitis, unspecified              Past Surgical History:   Procedure Laterality Date    APPENDECTOMY        DILATION AND CURETTAGE OF UTERUS USING SUCTION N/A 2018     Procedure: DILATION AND CURETTAGE, UTERUS, USING SUCTION;  Surgeon: Consuelo Glover MD;  Location: [facility];  Service: OB/GYN;  Laterality: N/A;    EAR TUBE REMOVAL Bilateral      FOOT SURGERY Right      WISDOM TOOTH EXTRACTION                Family History   Problem Relation Age of Onset    Colon cancer Paternal Uncle      Breast cancer Neg Hx      Ovarian cancer Neg Hx        Social History            Tobacco Use    Smoking status: Never Smoker    Smokeless tobacco: Never Used   Substance Use Topics    Alcohol use: Not Currently       Comment: socially    Drug use: No      Review of Systems   Constitutional: Negative for chills and fever.   HENT: Negative for congestion.    Eyes: Negative for visual disturbance.   Respiratory: Negative for shortness of breath.    Cardiovascular: Negative for chest pain.   Gastrointestinal: Positive for abdominal pain. Negative for diarrhea, nausea and vomiting.   Genitourinary: Negative for dysuria, hematuria, vaginal bleeding and vaginal discharge.   Musculoskeletal: Positive for back pain.   Neurological: Negative for dizziness and headaches.         Physical Exam             Initial Vitals   BP Pulse Resp Temp SpO2    10/31/19 0022 10/31/19 0021 10/31/19 0020 10/31/19 0020 --   127/85 82 18 98 °F (36.7 °C)         MAP           --                          Physical Exam     Vitals reviewed.  Constitutional: She appears well-developed and well-nourished. She is not diaphoretic. She appears distressed (appears to be in pain).   HENT:   Head: Normocephalic and atraumatic.   Eyes: EOM are normal.   Cardiovascular: Normal rate.   Pulmonary/Chest: No respiratory distress.   Abdominal: Soft. She exhibits no distension. There is no tenderness. There is no rebound and no guarding.   Musculoskeletal: Normal range of motion.   Neurological: She is alert and oriented to person, place, and time.   Skin: Skin is warm and dry.   Psychiatric: She has a normal mood and affect. Her behavior is normal. Judgment and thought content normal.      OB LABOR EXAM:   Pre-Term Labor: No.   Membranes ruptured: No.   Method: Sterile vaginal exam per MD.   Vaginal Bleeding: bloody show.      Dilatation: 3.   Station: -3.   Effacement: 90%.   Amniotic Fluid Color: no fluid.   Amniotic Fluid Amount: none noted.            ED Course   Obtain Fetal nonstress test (NST)  Date/Time: 10/31/2019 12:47 AM  Performed by: Ronny Ashton MD  Authorized by: oRnny Ashton MD      Nonstress Test:     Variability:  6-25 BPM    Decelerations:  None    Accelerations:  15 bpm    Baseline:  140    Uterine Irritability: No      Contractions:  Regular    Contraction Frequency:  Q 2-3 min  Biophysical Profile:     Nonstress Test Interpretation: reactive      Overall Impression:  Reassuring  Post-procedure:     Patient tolerance:  Patient tolerated the procedure well with no immediate complications        Labs Reviewed - No data to display     Imaging Results    None              ASSESSMENT/PLAN    1. Normal labor   Risks, benefits, alternatives and possible complications have been discussed in detail with the patient.   - Consents signed and to chart  - Admit to Labor and  Delivery unit  - Expectant management. Augmentation PRN.  - Epidural per Anesthesia  - Draw CBC, T&S  - Notify Staff  - Recheck in 2-4 hrs or PRN  - pt desires minimal intervention. Discussed that if augmentation needed will start with AROM. Pt desires intermittent monitoring. Okay to proceed - pt low risk.    2. Ulcerative colitis  - continue home mesalamine    Post-Partum Hemorrhage risk - low    Ronny Ashton MD  PGY3, OBGYN [facility]

## 2019-10-31 NOTE — NURSING
Patient and  request minimal intermittent fetal monitoring and decline IV fluids. Per facility protocol, RN will assess q 1 hour until 5 cm.

## 2019-10-31 NOTE — PROGRESS NOTES
LABOR PROGRESS NOTE    S:  MD to bedside for cervical check. Complaints: None.  Feeling pain with contractions.      O: Temp:  [97.3 °F (36.3 °C)-98.5 °F (36.9 °C)] 97.5 °F (36.4 °C)  Pulse:  [] 86  Resp:  [18] 18  SpO2:  [91 %-98 %] 98 %  BP: (118-137)/(78-87) 125/83    FHT: 125  BPM  CTX: q 3-5 minutes,     Dilation (cm): 5  Effacement (%): 90  Station: -2     ASSESSMENT:   34 y.o.  at 39w5d, no change.   FHT reassuring  Cat 1    Had discussion with pt and recommended AROM. Pt desired to wait 1 hour and recheck and AROM if no change.    TIMELINE  0245: /-3  0500: /-2  1030: 2, declined AROM    PLAN:  1. Labor management  -Continue intermittent Maternal/Fetal Monitoring if tolerated   -Recheck 2-4 hours or PRN and AROM if no change        Ronel Dougherty MD  Ob/Gyn PGY-4

## 2019-10-31 NOTE — PROGRESS NOTES
LABOR PROGRESS NOTE    S:  MD to bedside for cervical check. Complaints: None.      O: Temp:  [98 °F (36.7 °C)-98.4 °F (36.9 °C)] 98.4 °F (36.9 °C)  Pulse:  [] 82  Resp:  [18] 18  SpO2:  [91 %-97 %] 97 %  BP: (121-132)/(80-85) 121/81    FHT: 140 BPM/moderate beat to beat variability/+accels/-decels   CTX: q 3-5 minutes,     Dilation (cm): 4  Effacement (%): 90  Station: -2     ASSESSMENT:   34 y.o.  at 39w5d, no change.   FHT reassuring  Cat 1    Had discussion with pt and recommended AROM. Pt desired to wait 1 hour and recheck and AROM if no change.    TIMELINE  0245: 3  0500: 2    PLAN:  1. Labor management  -Continue intermittent Maternal/Fetal Monitoring if tolerated   -Recheck 1 hours or PRN and AROM if no change    BETTE Marley MD  OBGYN PGY1

## 2019-11-01 PROCEDURE — 62326 NJX INTERLAMINAR LMBR/SAC: CPT | Performed by: ANESTHESIOLOGY

## 2019-11-01 PROCEDURE — 11000001 HC ACUTE MED/SURG PRIVATE ROOM

## 2019-11-01 PROCEDURE — 59400 PR FULL ROUT OBSTE CARE,VAGINAL DELIV: ICD-10-PCS | Mod: GB,,, | Performed by: OBSTETRICS & GYNECOLOGY

## 2019-11-01 PROCEDURE — 27200710 HC EPIDURAL INFUSION PUMP SET: Performed by: STUDENT IN AN ORGANIZED HEALTH CARE EDUCATION/TRAINING PROGRAM

## 2019-11-01 PROCEDURE — 72100002 HC LABOR CARE, 1ST 8 HOURS

## 2019-11-01 PROCEDURE — 59409 OBSTETRICAL CARE: CPT | Mod: QY,,, | Performed by: ANESTHESIOLOGY

## 2019-11-01 PROCEDURE — C1751 CATH, INF, PER/CENT/MIDLINE: HCPCS | Performed by: STUDENT IN AN ORGANIZED HEALTH CARE EDUCATION/TRAINING PROGRAM

## 2019-11-01 PROCEDURE — 59409 PRA ETRICAL CARE,VAG DELIV ONLY: ICD-10-PCS | Mod: QY,,, | Performed by: ANESTHESIOLOGY

## 2019-11-01 PROCEDURE — 25000003 PHARM REV CODE 250: Performed by: STUDENT IN AN ORGANIZED HEALTH CARE EDUCATION/TRAINING PROGRAM

## 2019-11-01 PROCEDURE — 62326 NJX INTERLAMINAR LMBR/SAC: CPT | Performed by: STUDENT IN AN ORGANIZED HEALTH CARE EDUCATION/TRAINING PROGRAM

## 2019-11-01 PROCEDURE — 72100003 HC LABOR CARE, EA. ADDL. 8 HRS

## 2019-11-01 PROCEDURE — 72200004 HC VAGINAL DELIVERY LEVEL I

## 2019-11-01 PROCEDURE — 59400 OBSTETRICAL CARE: CPT | Mod: GB,,, | Performed by: OBSTETRICS & GYNECOLOGY

## 2019-11-01 PROCEDURE — 51702 INSERT TEMP BLADDER CATH: CPT

## 2019-11-01 RX ORDER — HYDROCODONE BITARTRATE AND ACETAMINOPHEN 5; 325 MG/1; MG/1
1 TABLET ORAL EVERY 4 HOURS PRN
Status: DISCONTINUED | OUTPATIENT
Start: 2019-11-01 | End: 2019-11-03 | Stop reason: HOSPADM

## 2019-11-01 RX ORDER — METHYLERGONOVINE MALEATE 0.2 MG/ML
INJECTION INTRAVENOUS
Status: DISPENSED
Start: 2019-11-01 | End: 2019-11-01

## 2019-11-01 RX ORDER — DIPHENHYDRAMINE HCL 25 MG
25 CAPSULE ORAL EVERY 4 HOURS PRN
Status: DISCONTINUED | OUTPATIENT
Start: 2019-11-01 | End: 2019-11-03 | Stop reason: HOSPADM

## 2019-11-01 RX ORDER — FAMOTIDINE 10 MG/ML
20 INJECTION INTRAVENOUS ONCE
Status: DISCONTINUED | OUTPATIENT
Start: 2019-11-01 | End: 2019-11-03 | Stop reason: HOSPADM

## 2019-11-01 RX ORDER — FENTANYL/BUPIVACAINE/NS/PF 2MCG/ML-.1
PLASTIC BAG, INJECTION (ML) INJECTION CONTINUOUS
Status: DISCONTINUED | OUTPATIENT
Start: 2019-11-01 | End: 2019-11-03 | Stop reason: HOSPADM

## 2019-11-01 RX ORDER — DOCUSATE SODIUM 100 MG/1
200 CAPSULE, LIQUID FILLED ORAL 2 TIMES DAILY PRN
Status: DISCONTINUED | OUTPATIENT
Start: 2019-11-01 | End: 2019-11-03 | Stop reason: HOSPADM

## 2019-11-01 RX ORDER — SODIUM CITRATE AND CITRIC ACID MONOHYDRATE 334; 500 MG/5ML; MG/5ML
30 SOLUTION ORAL ONCE
Status: DISCONTINUED | OUTPATIENT
Start: 2019-11-01 | End: 2019-11-03 | Stop reason: HOSPADM

## 2019-11-01 RX ORDER — OXYTOCIN/RINGER'S LACTATE 30/500 ML
41.65 PLASTIC BAG, INJECTION (ML) INTRAVENOUS CONTINUOUS
Status: DISCONTINUED | OUTPATIENT
Start: 2019-11-01 | End: 2019-11-01

## 2019-11-01 RX ORDER — HYDROCORTISONE 25 MG/G
CREAM TOPICAL 3 TIMES DAILY PRN
Status: DISCONTINUED | OUTPATIENT
Start: 2019-11-01 | End: 2019-11-03 | Stop reason: HOSPADM

## 2019-11-01 RX ORDER — FAMOTIDINE 10 MG/ML
20 INJECTION INTRAVENOUS ONCE
Status: DISCONTINUED | OUTPATIENT
Start: 2019-11-01 | End: 2019-11-01 | Stop reason: SDUPTHER

## 2019-11-01 RX ORDER — FENTANYL/BUPIVACAINE/NS/PF 2MCG/ML-.1
PLASTIC BAG, INJECTION (ML) INJECTION CONTINUOUS PRN
Status: DISCONTINUED | OUTPATIENT
Start: 2019-11-01 | End: 2019-11-01

## 2019-11-01 RX ORDER — IBUPROFEN 600 MG/1
600 TABLET ORAL EVERY 6 HOURS
Status: DISCONTINUED | OUTPATIENT
Start: 2019-11-01 | End: 2019-11-03 | Stop reason: HOSPADM

## 2019-11-01 RX ORDER — HYDROCODONE BITARTRATE AND ACETAMINOPHEN 10; 325 MG/1; MG/1
1 TABLET ORAL EVERY 4 HOURS PRN
Status: DISCONTINUED | OUTPATIENT
Start: 2019-11-01 | End: 2019-11-03 | Stop reason: HOSPADM

## 2019-11-01 RX ORDER — OXYTOCIN 10 [USP'U]/ML
INJECTION, SOLUTION INTRAMUSCULAR; INTRAVENOUS
Status: DISPENSED
Start: 2019-11-01 | End: 2019-11-01

## 2019-11-01 RX ORDER — MISOPROSTOL 200 UG/1
TABLET ORAL
Status: DISPENSED
Start: 2019-11-01 | End: 2019-11-01

## 2019-11-01 RX ORDER — TERBUTALINE SULFATE 1 MG/ML
INJECTION SUBCUTANEOUS
Status: DISPENSED
Start: 2019-11-01 | End: 2019-11-01

## 2019-11-01 RX ORDER — ACETAMINOPHEN 325 MG/1
650 TABLET ORAL EVERY 6 HOURS PRN
Status: DISCONTINUED | OUTPATIENT
Start: 2019-11-01 | End: 2019-11-03 | Stop reason: HOSPADM

## 2019-11-01 RX ORDER — LIDOCAINE HYDROCHLORIDE 10 MG/ML
INJECTION INFILTRATION; PERINEURAL
Status: DISPENSED
Start: 2019-11-01 | End: 2019-11-01

## 2019-11-01 RX ORDER — DIPHENHYDRAMINE HYDROCHLORIDE 50 MG/ML
25 INJECTION INTRAMUSCULAR; INTRAVENOUS EVERY 4 HOURS PRN
Status: DISCONTINUED | OUTPATIENT
Start: 2019-11-01 | End: 2019-11-03 | Stop reason: HOSPADM

## 2019-11-01 RX ORDER — CARBOPROST TROMETHAMINE 250 UG/ML
INJECTION, SOLUTION INTRAMUSCULAR
Status: DISPENSED
Start: 2019-11-01 | End: 2019-11-01

## 2019-11-01 RX ORDER — ONDANSETRON 8 MG/1
8 TABLET, ORALLY DISINTEGRATING ORAL EVERY 8 HOURS PRN
Status: DISCONTINUED | OUTPATIENT
Start: 2019-11-01 | End: 2019-11-03 | Stop reason: HOSPADM

## 2019-11-01 RX ORDER — AMOXICILLIN 250 MG
1 CAPSULE ORAL NIGHTLY
Status: DISCONTINUED | OUTPATIENT
Start: 2019-11-01 | End: 2019-11-03 | Stop reason: HOSPADM

## 2019-11-01 RX ORDER — OXYTOCIN/RINGER'S LACTATE 30/500 ML
95 PLASTIC BAG, INJECTION (ML) INTRAVENOUS ONCE
Status: DISCONTINUED | OUTPATIENT
Start: 2019-11-01 | End: 2019-11-03 | Stop reason: HOSPADM

## 2019-11-01 RX ADMIN — IBUPROFEN 600 MG: 600 TABLET, FILM COATED ORAL at 11:11

## 2019-11-01 RX ADMIN — IBUPROFEN 600 MG: 600 TABLET, FILM COATED ORAL at 05:11

## 2019-11-01 RX ADMIN — SENNOSIDES,DOCUSATE SODIUM 1 TABLET: 8.6; 5 TABLET, FILM COATED ORAL at 08:11

## 2019-11-01 RX ADMIN — MESALAMINE 1600 MG: 400 CAPSULE, DELAYED RELEASE ORAL at 08:11

## 2019-11-01 RX ADMIN — IBUPROFEN 600 MG: 600 TABLET, FILM COATED ORAL at 12:11

## 2019-11-01 RX ADMIN — Medication 10 ML/HR: at 12:11

## 2019-11-01 NOTE — PROGRESS NOTES
LABOR PROGRESS NOTE    S:  Painful contractions. Wants to discuss anesthesia options.    O: Temp:  [97.3 °F (36.3 °C)-98.7 °F (37.1 °C)] 98.5 °F (36.9 °C)  Pulse:  [] 85  Resp:  [18] 18  SpO2:  [91 %-98 %] 96 %  BP: ()/(56-87) 115/61    Manisha q 2 min, intermittent auscultation reassuring    7.5/0    ASSESSMENT:   34 y.o.  at 39w5d, active labor      TIMELINE  0245: /-3  0500: 4/-2  1030: /-2, declined AROM  1800 /-2, declined AROM  2100: /-2 AROM clear  0000: 7.5/0, getting epidural    PLAN:  Labor Management  -pt desires epidural. No longer candidate for intermittent auscultation. Will proceed with continuous fetal monitoring.  -making cervical change. Pitocin at next check if stops progressing.  -continue close maternal/fetal monitoring  -recheck 2 hrs or PRN    Ronny Ashton MD  PGY3, OBGYN [facility]

## 2019-11-01 NOTE — LACTATION NOTE
11/01/19 1615   Maternal Assessment   Breast Shape Bilateral:;round   Breast Density Bilateral:;soft   Areola Bilateral:  (scar tissue)   Nipples flat   Left Nipple Symptoms scarring   Right Nipple Symptoms scarring   Maternal Infant Feeding   Maternal Emotional State relaxed   Infant Positioning cross-cradle   Latch Assistance yes   Pt and baby engaged in skin to skin.  Baby in quiet alert state and rooting at the breast. Multiple attempts to latch on right breast in cross cradle and laid back. Pt agreeable to hand expression.  Baby received drops of ebm by spoon.  Scar tissue present on areola bilaterally.  LC reminded family to feed 8 or more in 24 hours  and educated family on feeding cues. After three hours stimulate baby to awake to state.  LC's name and number placed on white board and encouraged family to call with any other questions.

## 2019-11-01 NOTE — L&D DELIVERY NOTE
[facility]  Vaginal Delivery   Operative Note    SUMMARY     Normal spontaneous vaginal delivery of live infant, was placed on mothers abdomen for skin to skin and bulb suctioning performed.  Infant delivered position OA over intact perineum.  Nuchal cord: No.    Spontaneous delivery of placenta and IV pitocin given noting good uterine tone.  1st degree laceration noted along right labia and periurethra. Repaired with 2-0 and 3-0 vicryl.  Patient tolerated delivery well. Sponge needle and lap counted correctly x2.    I WAS PRESENT THROUGHOUT THE DELIVERY    Indications: Normal labor  Pregnancy complicated by:   Patient Active Problem List   Diagnosis    Colitis    Missed     Status post dilation and curettage    Normal labor     (spontaneous vaginal delivery)     Admitting GA: 39w6d    Delivery Information for Jamaal Herrera    Birth information:  YOB: 2018   Time of birth: 6:04 AM   Sex: female   Head Delivery Date/Time: 2019  6:04 AM   Delivery type: Vaginal, Spontaneous   Gestational Age: 39w6d    Delivery Providers    Delivering clinician:  Sulema Tran MD   Provider Role    Duglas Marley MD Resident    Angi Kent, RN Delivery Nurse    [facility]    Georgette Chavez RN Registered Nurse    Vladislav Clements RN Registered Nurse            Measurements    Weight:  3600 g  Length:  510.5 cm  Head circumference:  34.9 cm  Chest circumference:  31.8 cm         Apgars    Living status:  Living  Apgars:   1 min.:   5 min.:   10 min.:   15 min.:   20 min.:     Skin color:   1  1       Heart rate:   2  2       Reflex irritability:   2  2       Muscle tone:   2  2       Respiratory effort:   2  2       Total:   9  9              Operative Delivery    Forceps attempted?:  No  Vacuum extractor attempted?:  No         Shoulder Dystocia    Shoulder dystocia present?:  No           Presentation    Presentation:  Vertex  Position:  Left  Occiput Anterior           Interventions/Resuscitation    Method:  Bulb Suctioning, Tactile Stimulation       Cord    Vessels:  3 vessels  Complications:  None  Delayed Cord Clamping?:  Yes  Cord Blood Disposition:  Sent with Baby  Gases Sent?:  No  Stem Cell Collection (by MD):  No       Placenta    Placenta delivery date/time:  2019 0615  Placenta removal:  Spontaneous  Placenta appearance:  Intact  Placenta disposition:  discarded           Labor Events:       labor: No     Labor Onset Date/Time: 10/31/2019 21:03     Dilation Complete Date/Time: 2019 05:00     Start Pushing Date/Time: 2019 05:26     Rupture Date/Time: 10/31/19  2103         Rupture type:           Fluid Amount:        Fluid Color:        Fluid Odor:        Membrane Status (PeriCalm):        Rupture Date/Time (PeriCalm):        Fluid Amount (PeriCalm):        Fluid Color (PeriCalm):         steroids: None     Antibiotics given for GBS: No     Induction: none     Indications for induction:        Augmentation:       Indications for augmentation:       Labor complications: None     Additional complications:          Cervical ripening:                     Delivery:      Episiotomy: None     Indication for Episiotomy:       Perineal Lacerations: 1st Repaired:      Periurethral Laceration: bilateral Repaired: Yes   Labial Laceration: left Repaired: Yes   Sulcus Laceration:   Repaired:     Vaginal Laceration:   Repaired:     Cervical Laceration:   Repaired:     Repair suture:       Repair # of packets:       Last Value - EBL - Nursing (mL): 125     Sum - EBL - Nursing (mL): 125     Last Value - EBL - Anesthesia (mL):      Calculated QBL (mL):        Vaginal Sweep Performed: Yes     Surgicount Correct:         Other providers:       Anesthesia    Method:  Epidural          Details (if applicable):  Trial of Labor      Categorization:      Priority:     Indications for :     Incision  Type:       Additional  information:  Forceps:    Vacuum:    Breech:    Observed anomalies    Other (Comments):         BETTE Marley MD  OBGYN PGY1

## 2019-11-01 NOTE — PROGRESS NOTES
LABOR PROGRESS NOTE    S:  MD to bedside for cervical check. Complaints: None. Epidural working!    O: Temp:  [97.3 °F (36.3 °C)-98.7 °F (37.1 °C)] 98.2 °F (36.8 °C)  Pulse:  [] 100  Resp:  [18] 18  SpO2:  [91 %-100 %] 97 %  BP: ()/(54-92) 112/74    FHT: 130 BPM/moderate beat to beat variability/+accels/-decels; occasional variable w/ return to baseline  CTX: q 3 minutes,     Dilation (cm): 8  Effacement (%): 90  Station: +1     ASSESSMENT:   34 y.o.  at 39w6d, active labor. Progress  FHT reassuring  Cat 2    TIMELINE  0245: 490/-3  0500: 490/-2  1030: 590/-2, declined AROM  1800 5/-2, declined AROM  2100: 5/-2 AROM clear  0000: 7.590/0, getting epidural  0200: 8/+1      PLAN:  1. Labor management  -Continue Close Maternal/Fetal Monitoring.   -Recheck 2 hours or PRN    BETTE Marley MD  OBGYN PGY1

## 2019-11-01 NOTE — PROGRESS NOTES
LABOR PROGRESS NOTE    S:  MD to bedside for cervical check. Complaints: None.      O: Temp:  [97.3 °F (36.3 °C)-98.7 °F (37.1 °C)] 98.5 °F (36.9 °C)  Pulse:  [] 85  Resp:  [18] 18  SpO2:  [91 %-98 %] 96 %  BP: ()/(56-87) 115/61    FHT: 135 BPM/moderate beat to beat variability/+accels/-decels   CTX: q 4 minutes,     Dilation (cm): 5  Effacement (%): 90  Station: -2     ASSESSMENT:   34 y.o.  at 39w5d, latent labor  FHT reassuring  Cat 1      TIMELINE  0245: 4/-3  0500: 4/-2  1030: /90/-2, declined AROM  1800 /-2, declined AROM  2100: 5/-2 AROM clear    PLAN:  1. Labor management  -Continue Close Maternal/Fetal Monitoring  -Recheck in 2-4 hours or PRN    BETTE Marley MD  OBGYN PGY1

## 2019-11-01 NOTE — DISCHARGE INSTRUCTIONS
Breastfeeding Discharge Instructions       Feed the baby at the earliest sign of hunger or comfort  o Hands to mouth, sucking motions  o Rooting or searching for something to suck on  o Dont wait for crying - it is a sign of distress     The feedings may be 8-12 times per 24hrs and will not follow a schedule   Avoid pacifiers and bottles for the first 4 weeks   Alternate the breast you start the feeding with, or start with the breast that feels the fullest   Switch breasts when the baby takes himself off the breast or falls asleep   Keep offering breasts until the baby looks full, no longer gives hunger signs, and stays asleep when placed on his back in the crib   If the baby is sleepy and wont wake for a feeding, put the baby skin-to-skin dressed in a diaper against the mothers bare chest   Sleep near your baby   The baby should be positioned and latched on to the breast correctly  o Chest-to-chest, chin in the breast  o Babys lips are flipped outward  o Babys mouth is stretched open wide like a shout  o Babys sucking should feel like tugging to the mother  - The baby should be drinking at the breast:  o You should hear swallowing or gulping throughout the feeding  o You should see milk on the babys lips when he comes off the breast  o Your breasts should be softer when the baby is finished feeding  o The baby should look relaxed at the end of feedings  o After the 4th day and your milk is in:  o The babys poop should turn bright yellow and be loose, watery, and seedy  o The baby should have at least 3-4 poops the size of the palm of your hand per day  o The baby should have at least 5-6 wet diapers per day  o The urine should be light yellow in color  You should drink when you are thirsty and eat a healthy diet when you are    hungry.     Take naps to get the rest you need.   Take medications and/or drink alcohol only with permission of your obstetrician    or the babys pediatrician.  You can  also call the Infant Risk Center,   ([phone]), Monday-Friday, 8am-5pm Central time, to get the most   up-to-date evidence-based information on the use of medications during   pregnancy and breastfeeding.      The baby should be examined by a pediatrician at 3-5 days of age.  Once your   milk comes in, the baby should be gaining at least ½ - 1oz each day and should be back to birthweight no later than 10-14 days of age.          Community Resources    [facility] Breastfeeding Warmline: [phone]   Local [facility] clinics: provide incentives and breastpumps to eligible mothers  [employer]he [employer] (LLLI):  mother-to-mother support group website        www.ActXl.[employer] Control  Local La Leche League mother-to-mother support groups:        www.[place]        La Leche League [facility]   Dr. Jack Lara website for latch videos and general information:        www.breastfeedinginc.ca  Infant Risk Center is a call center that provides information about the safety of taking medications while breastfeeding.  Call 1-543.591.8421, M-F, 8am-5pm, CT.  International Lactation Consultant Association provides resources for assistance:        www.ilca.org  [place] Breastfeeding Coalition provides informationand resources for parents  and the community    www.LaBreastfeedingSupport.org     Kaitlynn Moody is a mom-to-mom support group:                             www.isango![employer].com//breastfeedng-support/  Partners for Healthy Babies:  5-968-179-BABY(5324)  C[employer]ait: a breastfeeding support group for women of color, [phone]

## 2019-11-01 NOTE — PLAN OF CARE
Pt to follow basic breastfeeding instruction.  If baby is unable to latch, hand express and provide by spoon.

## 2019-11-01 NOTE — LACTATION NOTE
Lactation Round:  Baby spitty.   Pt placed baby skin to skin. LC provided basic breastfeeding education. LC encouraged Pt to continue skin to skin. LC placed number on white board and encouraged family to call when baby shows cues to feed.

## 2019-11-01 NOTE — ANESTHESIA PROCEDURE NOTES
Epidural    Patient location during procedure: OB   Reason for block: primary anesthetic   Diagnosis: IUP   Start time: 11/1/2019 12:06 AM  Timeout: 11/1/2019 12:05 AM  End time: 11/1/2019 12:12 AM  Surgery related to: Vaginal Delivery    Staffing  Performing Provider: Kalyan Aguilar MD  Authorizing Provider: Calvin Guthrie MD        Preanesthetic Checklist  Completed: patient identified, site marked, surgical consent, pre-op evaluation, timeout performed, IV checked, risks and benefits discussed, monitors and equipment checked, anesthesia consent given, hand hygiene performed and patient being monitored  Preparation  Patient position: sitting  Prep: ChloraPrep  Patient monitoring: Pulse Ox  Epidural  Skin Anesthetic: lidocaine 1%  Skin Wheal: 3 mL  Administration type: continuous  Approach: midline  Interspace: L3-4    Injection technique: DIANE saline  Needle and Epidural Catheter  Needle type: Tuohy   Needle gauge: 17  Needle length: 3.5 inches  Needle insertion depth: 6 cm  Catheter type: springwound  Catheter size: 19 G  Catheter at skin depth: 10 cm  Test dose: 3 mL of lidocaine 1.5% with Epi 1-to-200,000  Additional Documentation: incremental injection, negative aspiration for heme and CSF, no paresthesia on injection, no signs/symptoms of IV or SA injection, no significant pain on injection and no significant complaints from patient  Needle localization: anatomical landmarks  Medications:  Volume per aspiration: 5 mL  Time between aspirations: 5 minutes  Assessment  Ease of block: easy  Patient's tolerance of the procedure: comfortable throughout block and no complaintsNo inadvertent dural puncture with Tuohy.  Dural puncture performed with spinal needle.

## 2019-11-02 LAB
BASOPHILS # BLD AUTO: 0.04 K/UL (ref 0–0.2)
BASOPHILS NFR BLD: 0.3 % (ref 0–1.9)
DIFFERENTIAL METHOD: ABNORMAL
EOSINOPHIL # BLD AUTO: 0.1 K/UL (ref 0–0.5)
EOSINOPHIL NFR BLD: 0.7 % (ref 0–8)
ERYTHROCYTE [DISTWIDTH] IN BLOOD BY AUTOMATED COUNT: 12.9 % (ref 11.5–14.5)
HCT VFR BLD AUTO: 29.1 % (ref 37–48.5)
HGB BLD-MCNC: 9.8 G/DL (ref 12–16)
IMM GRANULOCYTES # BLD AUTO: 0.25 K/UL (ref 0–0.04)
IMM GRANULOCYTES NFR BLD AUTO: 1.8 % (ref 0–0.5)
LYMPHOCYTES # BLD AUTO: 2.7 K/UL (ref 1–4.8)
LYMPHOCYTES NFR BLD: 20 % (ref 18–48)
MCH RBC QN AUTO: 30.7 PG (ref 27–31)
MCHC RBC AUTO-ENTMCNC: 33.7 G/DL (ref 32–36)
MCV RBC AUTO: 91 FL (ref 82–98)
MONOCYTES # BLD AUTO: 1.1 K/UL (ref 0.3–1)
MONOCYTES NFR BLD: 7.9 % (ref 4–15)
NEUTROPHILS # BLD AUTO: 9.4 K/UL (ref 1.8–7.7)
NEUTROPHILS NFR BLD: 69.3 % (ref 38–73)
NRBC BLD-RTO: 0 /100 WBC
PLATELET # BLD AUTO: 216 K/UL (ref 150–350)
PMV BLD AUTO: 11 FL (ref 9.2–12.9)
RBC # BLD AUTO: 3.19 M/UL (ref 4–5.4)
WBC # BLD AUTO: 13.59 K/UL (ref 3.9–12.7)

## 2019-11-02 PROCEDURE — 36415 COLL VENOUS BLD VENIPUNCTURE: CPT

## 2019-11-02 PROCEDURE — 25000003 PHARM REV CODE 250: Performed by: STUDENT IN AN ORGANIZED HEALTH CARE EDUCATION/TRAINING PROGRAM

## 2019-11-02 PROCEDURE — 85025 COMPLETE CBC W/AUTO DIFF WBC: CPT

## 2019-11-02 PROCEDURE — 11000001 HC ACUTE MED/SURG PRIVATE ROOM

## 2019-11-02 RX ORDER — FERROUS SULFATE 325(65) MG
325 TABLET, DELAYED RELEASE (ENTERIC COATED) ORAL DAILY
Status: DISCONTINUED | OUTPATIENT
Start: 2019-11-02 | End: 2019-11-03 | Stop reason: HOSPADM

## 2019-11-02 RX ORDER — IBUPROFEN 600 MG/1
600 TABLET ORAL EVERY 6 HOURS
Qty: 30 TABLET | Refills: 1 | Status: SHIPPED | OUTPATIENT
Start: 2019-11-03 | End: 2020-12-22

## 2019-11-02 RX ADMIN — IBUPROFEN 600 MG: 600 TABLET, FILM COATED ORAL at 05:11

## 2019-11-02 RX ADMIN — MESALAMINE 1600 MG: 400 CAPSULE, DELAYED RELEASE ORAL at 08:11

## 2019-11-02 RX ADMIN — DOCUSATE SODIUM 200 MG: 100 CAPSULE, LIQUID FILLED ORAL at 08:11

## 2019-11-02 RX ADMIN — IBUPROFEN 600 MG: 600 TABLET, FILM COATED ORAL at 12:11

## 2019-11-02 RX ADMIN — SENNOSIDES,DOCUSATE SODIUM 1 TABLET: 8.6; 5 TABLET, FILM COATED ORAL at 09:11

## 2019-11-02 RX ADMIN — MESALAMINE 1600 MG: 400 CAPSULE, DELAYED RELEASE ORAL at 09:11

## 2019-11-02 RX ADMIN — FERROUS SULFATE TAB EC 325 MG (65 MG FE EQUIVALENT) 325 MG: 325 (65 FE) TABLET DELAYED RESPONSE at 08:11

## 2019-11-02 NOTE — LACTATION NOTE
11/02/19 1005   Maternal Assessment   Breast Shape Bilateral:;round   Breast Density Bilateral:;soft   Areola Bilateral:;elastic;other (see comments)  (scarring around nipple x 2 due to previous piercing)   Nipples Bilateral:;flat   Left Nipple Symptoms scarring   Right Nipple Symptoms scarring   Maternal Infant Feeding   Maternal Emotional State assist needed   Infant Positioning cross-cradle   Signs of Milk Transfer audible swallow;infant jaw motion present   Pain with Feeding no   Latch Assistance yes   Lactation note:  Assisted with breastfeeding; infant fussy but able to latch after multiple attempts. Mother's nipples flat, so assisted with sandwiching breast to get a deep enough latch. Infant gaggy but settled down after a few sucks. Infant nursing effectively with breast compression/stimulation. Mother able to latch to other side with minimal assistance. Breast pump set up for extra stimulation and to help rhonda nipples prior to latching.

## 2019-11-02 NOTE — PROGRESS NOTES
POSTPARTUM PROGRESS NOTE     Alisa Herrera is a 34 y.o. female PPD #1 status post Spontaneous vaginal delivery at 39w6d in a pregnancy complicated by ulcerative colitis (on mesalamine).    Patient is doing well this morning. She denies nausea, vomiting, fever or chills. Patient reports mild abdominal pain that is adequately relieved by oral pain medications. Lochia is mild to moderate and stable. Patient is voiding without difficulty and ambulating with no difficulty. She has passed flatus.    Patient does plan to breast feed. Contraception per primary OB.     Objective:       Temp:  [97.9 °F (36.6 °C)-98.6 °F (37 °C)] 98.5 °F (36.9 °C)  Pulse:  [71-91] 71  Resp:  [15-18] 18  SpO2:  [96 %-97 %] 96 %  BP: (120-123)/(69-85) 123/85    General:   alert, appears stated age and cooperative   Lungs:   Non-labored respirations    Heart:   regular rate and rhythm   Abdomen:  Soft, nondistended    Uterus: Firm located at the umbilicus   Extremities: no pedal edema noted     Lab Review  No results found for this or any previous visit (from the past 4 hour(s)).    I/O    Intake/Output Summary (Last 24 hours) at 2019 0812  Last data filed at 2019 1200  Gross per 24 hour   Intake --   Output 1600 ml   Net -1600 ml        Assessment:     Patient Active Problem List   Diagnosis    Colitis    Missed     Status post dilation and curettage    Normal labor     (spontaneous vaginal delivery)        Plan:   1. Postpartum care:  - Patient doing well. Continue routine management and advances.  - Continue PO pain meds. Pain well controlled.  - Heme: H/h 12.0/35.6 > 9.8/29.1  - Encourage ambulation  - Contraception to be discussed at 6 week pp visit  - Lactation consult PRN  - Rh positive    2. ABL Anemia:  - H/h 12.0/35.6 > 9.8/29.1  - Fe/colace  - Asymptomatic    3. Ulcerative colitis:  - Continue home mesalamine      Dispo: As patient meets milestones, will plan to discharge on PPD  #1-2.      Yarelis Epps M.D.  OB/GYN PGY-1    Doing well, no questions,no problems.  I have reviewed the resident's note, evaluated the patient and agree with the diagnosis and management plan

## 2019-11-02 NOTE — ANESTHESIA POSTPROCEDURE EVALUATION
Anesthesia Post Evaluation    Patient: Alisa Herrera    Procedure(s) Performed: * No procedures listed *    Final Anesthesia Type: epidural  Patient location during evaluation: floor  Patient participation: Yes- Able to Participate  Level of consciousness: awake and alert  Post-procedure vital signs: reviewed and stable  Pain management: adequate  Airway patency: patent  PONV status at discharge: No PONV  Anesthetic complications: no      Cardiovascular status: blood pressure returned to baseline and hemodynamically stable  Respiratory status: unassisted, spontaneous ventilation and room air  Hydration status: euvolemic  Follow-up not needed.          Vitals Value Taken Time   /85 11/2/2019  7:43 AM   Temp 36.9 °C (98.5 °F) 11/2/2019  7:43 AM   Pulse 71 11/2/2019  7:43 AM   Resp 18 11/2/2019  7:43 AM   SpO2 96 % 11/2/2019  7:43 AM         No case tracking events are documented in the log.      Pain/Kaelyn Score: Pain Rating Prior to Med Admin: 3 (11/2/2019  5:39 AM)  Pain Rating Post Med Admin: 0 (11/2/2019 12:07 AM)

## 2019-11-02 NOTE — PLAN OF CARE
Lactation note:  To room to assist with breastfeeding. Infant has been having trouble maintaining latch per mom. Assisted with latch at breast. It took many attempts but LC able to get infant to latch to right breast in cross cradle position and nursing effectively with stimulation/breast compression. Mom able to latch with minimal assistance to left breast after other side. Shells given to help rhonda nipples as mother's nipples flat. Started mom pumping to use prior to feedings to rhonda nipple and after nursing for extra stimulation/emptying. Encouraged nursing infant 8 or more times in 24 hours with cues until content then offering extra breast milk after pumping. Reviewed use/care of breast pump parts and milk storage. Offered further assistance as needed.  phone number on board.

## 2019-11-03 ENCOUNTER — NURSE TRIAGE (OUTPATIENT)
Dept: ADMINISTRATIVE | Facility: CLINIC | Age: 34
End: 2019-11-03

## 2019-11-03 VITALS
DIASTOLIC BLOOD PRESSURE: 81 MMHG | TEMPERATURE: 98 F | HEIGHT: 66 IN | HEART RATE: 72 BPM | SYSTOLIC BLOOD PRESSURE: 127 MMHG | BODY MASS INDEX: 29.25 KG/M2 | RESPIRATION RATE: 18 BRPM | OXYGEN SATURATION: 97 % | WEIGHT: 182 LBS

## 2019-11-03 PROCEDURE — 99024 POSTOP FOLLOW-UP VISIT: CPT | Mod: ,,, | Performed by: OBSTETRICS & GYNECOLOGY

## 2019-11-03 PROCEDURE — 25000003 PHARM REV CODE 250: Performed by: STUDENT IN AN ORGANIZED HEALTH CARE EDUCATION/TRAINING PROGRAM

## 2019-11-03 PROCEDURE — 99024 PR POST-OP FOLLOW-UP VISIT: ICD-10-PCS | Mod: ,,, | Performed by: OBSTETRICS & GYNECOLOGY

## 2019-11-03 RX ADMIN — MESALAMINE 1600 MG: 400 CAPSULE, DELAYED RELEASE ORAL at 08:11

## 2019-11-03 RX ADMIN — IBUPROFEN 600 MG: 600 TABLET, FILM COATED ORAL at 12:11

## 2019-11-03 RX ADMIN — DOCUSATE SODIUM 200 MG: 100 CAPSULE, LIQUID FILLED ORAL at 08:11

## 2019-11-03 RX ADMIN — IBUPROFEN 600 MG: 600 TABLET, FILM COATED ORAL at 05:11

## 2019-11-03 RX ADMIN — FERROUS SULFATE TAB EC 325 MG (65 MG FE EQUIVALENT) 325 MG: 325 (65 FE) TABLET DELAYED RESPONSE at 08:11

## 2019-11-03 NOTE — DISCHARGE SUMMARY
Delivery Discharge Summary  Obstetrics      Primary OB Clinician: Consuelo Glover MD      Admission date: 10/31/2019  Discharge date: 2019    Disposition: To home, self care    Discharge Diagnosis List:      Patient Active Problem List   Diagnosis    Colitis    Missed     Status post dilation and curettage    Normal labor     (spontaneous vaginal delivery)       Procedure:     Hospital Course:  Alisa Herrera is a 34 y.o. now , PPD #2 who was admitted on 10/31/2019 at 39w5d for labor. Patient was subsequently admitted to labor and delivery unit with signed consents.     Labor course was uncomplicated and resulted in  without complications.     Please see delivery note for further details. Her postpartum course was uncomplicated. On discharge day, patient's pain is controlled with oral pain medications. Pt is tolerating ambulation without SOB or CP, and regular diet without N/V. Reports lochia is mild. Denies any HA, vision changes, F/C, LE swelling. Denies any breast pain/soreness.    Pt in stable condition and ready for discharge. She has been instructed to start and/or continue medications and follow up with her obstetrics provider as listed below.    Pertinent studies:  CBC  Recent Labs   Lab 10/31/19  0205 19  0350   WBC 16.19* 13.59*   HGB 12.0 9.8*   HCT 35.6* 29.1*   MCV 92 91    216          Immunization History   Administered Date(s) Administered    Influenza - Quadrivalent - PF (6 months and older) 2019    PPD Test 2018, 2019    Tdap 2019        Delivery:    Episiotomy: None   Lacerations: 1st   Repair suture:     Repair # of packets:     Blood loss (ml): 125     Birth information:  YOB: 2018   Time of birth: 6:04 AM   Sex: female   Delivery type: Vaginal, Spontaneous   Gestational Age: 39w6d    Delivery Clinician:      Other providers:       Additional  information:  Forceps:    Vacuum:    Breech:     Observed anomalies      Living?:           APGARS  One minute Five minutes Ten minutes   Skin color:         Heart rate:         Grimace:         Muscle tone:         Breathing:         Totals: 9  9        Placenta: Delivered:       appearance      Patient Instructions:   Current Discharge Medication List      START taking these medications    Details   ibuprofen (ADVIL,MOTRIN) 600 MG tablet Take 1 tablet (600 mg total) by mouth every 6 (six) hours.  Qty: 30 tablet, Refills: 1         CONTINUE these medications which have NOT CHANGED    Details   CANASA 1,000 mg Supp INSERT 1 SUPPOSITORY AT BEDTIME FOR 30 DAYS  Refills: 2      clindamycin (CLEOCIN T) 1 % external solution APPLY TO SCALP DAILY AS NEEDED  Refills: 6      cyanocobalamin (VITAMIN B-12) 1000 MCG tablet Take 100 mcg by mouth once daily.      fish oil-omega-3 fatty acids 300-1,000 mg capsule Take 2 g by mouth once daily.      FLUCELVAX QUAD 7767-8845, PF, 60 mcg (15 mcg x 4)/0.5 mL Syrg vaccine TO BE ADMINISTERED BY PHARMACIST FOR IMMUNIZATION  Refills: 0      FLUZONE QUAD 3765-2710, PF, 60 mcg (15 mcg x 4)/0.5 mL Syrg TO BE ADMINISTERED BY PHARMACIST FOR IMMUNIZATION  Refills: 0      folic acid (FOLVITE) 400 MCG tablet       mesalamine (ASACOL) 800 mg TbEC Take 1,600 mg by mouth 2 (two) times daily.  Refills: 3      multivitamin capsule Take 1 capsule by mouth once daily.      ondansetron (ZOFRAN-ODT) 4 MG TbDL Take 1 tablet (4 mg total) by mouth every 6 (six) hours as needed (nausea).  Qty: 20 tablet, Refills: 0             Discharge Procedure Orders   Diet Adult Regular     Other restrictions (specify):   Order Comments: No heavy lifting over 5 lbs  No baths until 6 week check up. Showers only.   No driving until off narcotic pain medication/not feeling drowsy or dizzy when driving.     Notify your health care provider if you experience any of the following:  temperature >100.4     Notify your health care provider if you experience any of the  following:  persistent nausea and vomiting or diarrhea     Notify your health care provider if you experience any of the following:  severe uncontrolled pain     Notify your health care provider if you experience any of the following:  redness, tenderness, or signs of infection (pain, swelling, redness, odor or green/yellow discharge around incision site)     Notify your health care provider if you experience any of the following:  difficulty breathing or increased cough     Notify your health care provider if you experience any of the following:  severe persistent headache     Notify your health care provider if you experience any of the following:  worsening rash     Notify your health care provider if you experience any of the following:  persistent dizziness, light-headedness, or visual disturbances     Notify your health care provider if you experience any of the following:  increased confusion or weakness     Notify your health care provider if you experience any of the following:   Order Comments: Call if you are saturating more than 1 pad an hour for 2 consecutive hours.       Follow-up Information     Consuelo Glover MD In 6 weeks.    Specialties:  Obstetrics and Gynecology, Obstetrics  Why:  postpartum visit  Contact information:  [address] [place] [id number] [phone]                    Carmelina Canchola M.D.  OBGYN PGY1

## 2019-11-03 NOTE — PLAN OF CARE
Follow discharge education. Continue to pump after feedings for extra stimulation, give EBM via spoon as needed.

## 2019-11-03 NOTE — LACTATION NOTE
11/03/19 1115   Maternal Assessment   Breast Shape Bilateral:;round   Breast Density Bilateral:;soft   Areola Bilateral:;elastic   Nipples Bilateral:;flat   Maternal Infant Feeding   Maternal Emotional State assist needed;relaxed   Infant Positioning cross-cradle   Signs of Milk Transfer infant jaw motion present   Comfort Measures Before/During Feeding ice briefly applied to nipple(s);infant position adjusted;latch adjusted;maternal position adjusted   Latch Assistance other (see comments)  (independent, adust by roating infant inward)   Breastfeeding Supplementation   Infant Indication for Supplementation other (see comments)  (as needed)   Breastfeeding Supplementation Type expressed breast milk   Method of Supplementation spoon   Equipment Type   Breast Pump Type double electric, hospital grade   Breast Pump Flange Type hard   Breast Pump Flange Size 24 mm   Breast Pumping   Breast Pumping Interventions post-feed pumping encouraged   Breast Pumping single electric breast pump utilized;double electric breast pump utilized   lactation rounds. Discharge education reviewed. Latch assessment. Infant latched , wide, nursing well. mother reports that latch has improved, and infant sustaining latch longer. LC encouraged pt to continue to pump after feedings for extra stimulation. Supplement with EBM as needed via spoon.

## 2019-11-03 NOTE — PROGRESS NOTES
POSTPARTUM PROGRESS NOTE     Alisa Herrera is a 34 y.o. female PPD #2 status post Spontaneous vaginal delivery at 39w6d in a pregnancy complicated by ulcerative colitis (on mesalamine).    Patient is doing well this morning. She denies nausea, vomiting, fever or chills. Patient reports mild abdominal pain that is adequately relieved by oral pain medications. Lochia is mild to moderate and stable. Patient is voiding without difficulty and ambulating with no difficulty. She has passed flatus.    Patient does plan to breast feed. Contraception per primary OB.     Objective:       Temp:  [97.9 °F (36.6 °C)-98.5 °F (36.9 °C)] 98.4 °F (36.9 °C)  Pulse:  [65-72] 72  Resp:  [18] 18  SpO2:  [96 %-99 %] 97 %  BP: (118-127)/(81-85) 127/81    General:   alert, appears stated age and cooperative   Lungs:   Non-labored respirations    Heart:   regular rate and rhythm   Abdomen:  Soft, nondistended    Uterus: Firm located at the umbilicus   Extremities: no pedal edema noted     Lab Review  No results found for this or any previous visit (from the past 4 hour(s)).    I/O  No intake or output data in the 24 hours ending 19 0520     Assessment:     Patient Active Problem List   Diagnosis    Colitis    Missed     Status post dilation and curettage    Normal labor     (spontaneous vaginal delivery)        Plan:   1. Postpartum care:  - Patient doing well. Plan for discharge today.  - Continue PO pain meds. Pain well controlled.  - Heme: H/h 12.0/35.6 > 9.8/29.1  - Encourage ambulation  - Contraception to be discussed at 6 week pp visit  - Lactation consult PRN  - Rh positive    2. ABL Anemia:  - H/h 12.0/35.6 > 9.8/29.1  - Fe/colace  - Asymptomatic    3. Ulcerative colitis:  - Continue home mesalamine      Dispo: Plan for discharge today.     Carmelina Canchola M.D.  OBGYN PGY1

## 2019-11-03 NOTE — PLAN OF CARE
Pt ambulating, voiding, and passing flatus. Pt tolerating PO well and there is no SS of distress at the time. Pts pain well controlled throughout shift by oral pain medication. Pt's bleeding has been light throughout shift and fundus is firm. Mother baby care guide reviewed. All questions answered. Pt verbalized understanding to follow up with OB in 6 weeks. Medications delivered to bedside. Reviewed medication list, when to call provider, and SS of infection. Pt stable at this time. ID band verified. Awaiting transport.

## 2019-11-04 NOTE — TELEPHONE ENCOUNTER
Reason for Disposition   Caller has medication question, adult has minor symptoms, caller declines triage, and triager answers question    Protocols used: MEDICATION QUESTION CALL-A-AH    Ms. Herrera called to ask if she could use leftover 800 mg PO ibuprofen from a procedure earlier this year because she couldn't get to the pharmacy in time to get her Rx 600 mg ibuprofen. Advised the patient to take as directed and she could take 3 of the 200 mg tabs at home. She verbalized understanding.

## 2019-11-05 ENCOUNTER — PATIENT MESSAGE (OUTPATIENT)
Dept: OBSTETRICS AND GYNECOLOGY | Facility: CLINIC | Age: 34
End: 2019-11-05

## 2019-11-06 ENCOUNTER — POSTPARTUM VISIT (OUTPATIENT)
Dept: OBSTETRICS AND GYNECOLOGY | Facility: CLINIC | Age: 34
End: 2019-11-06
Payer: COMMERCIAL

## 2019-11-06 VITALS
SYSTOLIC BLOOD PRESSURE: 132 MMHG | BODY MASS INDEX: 26.5 KG/M2 | WEIGHT: 164.88 LBS | DIASTOLIC BLOOD PRESSURE: 86 MMHG | HEIGHT: 66 IN

## 2019-11-06 PROCEDURE — 0503F POSTPARTUM CARE VISIT: CPT | Mod: S$GLB,,, | Performed by: NURSE PRACTITIONER

## 2019-11-06 PROCEDURE — 99999 PR PBB SHADOW E&M-EST. PATIENT-LVL III: CPT | Mod: PBBFAC,,, | Performed by: NURSE PRACTITIONER

## 2019-11-06 PROCEDURE — 0503F PR POSTPARTUM CARE VISIT: ICD-10-PCS | Mod: S$GLB,,, | Performed by: NURSE PRACTITIONER

## 2019-11-06 PROCEDURE — 99999 PR PBB SHADOW E&M-EST. PATIENT-LVL III: ICD-10-PCS | Mod: PBBFAC,,, | Performed by: NURSE PRACTITIONER

## 2019-11-06 NOTE — PROGRESS NOTES
"CC: Post-partum follow-up, vaginal suture check    Alisa Herrera is a 34 y.o. female  who presents for post-partum visit.  She is S/P a vaginal delivery.  She and the baby girl are doing well.   No fever.   No bowel / bladder complaints. Pt is concerned about her vaginal sutures from her laceration. Reports that she has noticed more bleeding on her pad from the laceration site than vaginal bleeding. Denies fever or chills. Denies malodorous vaginal discharge. Denies heavy vaginal bleeding. Reports discomfort in the area but reports that it is controlled by 600 mg ibuprofen prn. Mood stable.     Delivery Date: 19  Gender: female  Breast Feeding: YES  Depression: NO  Contraception: abstinence    Pregnancy was complicated by:  nothing    /86   Ht 5' 6" (1.676 m)   Wt 74.8 kg (164 lb 14.5 oz)   LMP 2019   BMI 26.62 kg/m²     ROS:  GENERAL: No fever, chills, fatigability.  VULVAR: No pain, no lesions and no itching.  VAGINAL: No relaxation, no itching, no discharge, no abnormal bleeding and no lesions.  ABDOMEN: No abdominal pain. Denies nausea. Denies vomiting. No diarrhea. No constipation  BREAST: Denies pain. No lumps.  URINARY: No incontinence, no nocturia, no frequency and no dysuria.  CARDIOVASCULAR: No chest pain. No shortness of breath. No leg cramps.  NEUROLOGICAL: No headaches. No vision changes.    PHYSICAL EXAM:    ABDOMEN:  Soft, non-tender, non-distended  VULVA:  Sutures in place to right anterior labia minora. Skin well approximated. No abnormal discharge or bleeding noted. No swelling or erythema.   CERVIX:  Deferred  UTERUS:  Deferred   ADNEXA:  Deferred    IMP:  Doing well S/P . Dr. Farrell also to assess patient. Discussed normal healing of vagina with no bleeding or signs of infection noted. Patient to call back if she notices heavy active bleeding from suture sites, edema, erythema, foul discharge or experiences fever or chills.   Instructions / precautions " reviewed      PLAN:  Continue perineal hygiene and care with dermoplast PRN (especially prior to urination) and peribottle after urination. RTC for signs of infection. Pelvic rest until next post partum visit.     F/u for 6 week post partum check in 5 weeks.       MARY Ann-C

## 2019-11-11 ENCOUNTER — PATIENT MESSAGE (OUTPATIENT)
Dept: OBSTETRICS AND GYNECOLOGY | Facility: CLINIC | Age: 34
End: 2019-11-11

## 2019-11-11 ENCOUNTER — TELEPHONE (OUTPATIENT)
Dept: OBSTETRICS AND GYNECOLOGY | Facility: CLINIC | Age: 34
End: 2019-11-11

## 2019-11-11 DIAGNOSIS — O92.13 CRACKED NIPPLE ASSOCIATED WITH LACTATION: Primary | ICD-10-CM

## 2019-11-11 NOTE — TELEPHONE ENCOUNTER
----- Message from Nader Kramer sent at 11/11/2019  4:02 PM CST -----  PLEASE CALL PT SHE NEEDS TO SCHEDULE HER PP APPT IN [phone]

## 2019-12-04 ENCOUNTER — TELEPHONE (OUTPATIENT)
Dept: OBSTETRICS AND GYNECOLOGY | Facility: OTHER | Age: 34
End: 2019-12-04

## 2019-12-06 ENCOUNTER — POSTPARTUM VISIT (OUTPATIENT)
Dept: OBSTETRICS AND GYNECOLOGY | Facility: CLINIC | Age: 34
End: 2019-12-06
Payer: COMMERCIAL

## 2019-12-06 VITALS
DIASTOLIC BLOOD PRESSURE: 78 MMHG | SYSTOLIC BLOOD PRESSURE: 122 MMHG | WEIGHT: 158.75 LBS | BODY MASS INDEX: 25.51 KG/M2 | HEIGHT: 66 IN

## 2019-12-06 PROCEDURE — 0503F POSTPARTUM CARE VISIT: CPT | Mod: S$GLB,,, | Performed by: OBSTETRICS & GYNECOLOGY

## 2019-12-06 PROCEDURE — 0503F PR POSTPARTUM CARE VISIT: ICD-10-PCS | Mod: S$GLB,,, | Performed by: OBSTETRICS & GYNECOLOGY

## 2019-12-06 PROCEDURE — 99999 PR PBB SHADOW E&M-EST. PATIENT-LVL III: CPT | Mod: PBBFAC,,, | Performed by: OBSTETRICS & GYNECOLOGY

## 2019-12-06 PROCEDURE — 99999 PR PBB SHADOW E&M-EST. PATIENT-LVL III: ICD-10-PCS | Mod: PBBFAC,,, | Performed by: OBSTETRICS & GYNECOLOGY

## 2019-12-06 NOTE — PROGRESS NOTES
"CC: Post-partum follow-up    Alisa Herrera is a 34 y.o. female  presents for post-partum visit s/p a .    Delivery Date: 2019  Delivery MD: MD Marc  Gender: female  Birth Weight: 7 pounds 15 ounces  Breast Feeding: YES  Depression: NO  Contraception: no method    Pregnancy was complicated by: none      /78   Ht 5' 6" (1.676 m)   Wt 72 kg (158 lb 11.7 oz)   LMP 2019   Breastfeeding? Yes   BMI 25.62 kg/m²     ROS:  GENERAL: No fever, chills, fatigability or weight loss.  VULVAR: No pain, no lesions and no itching.  VAGINAL: No relaxation, no itching, no discharge, no abnormal bleeding and no lesions.  ABDOMEN: No abdominal pain. Denies nausea. Denies vomiting. No diarrhea. No constipation  BREAST: Denies pain. No lumps. No discharge.  URINARY: No incontinence, no nocturia, no frequency and no dysuria.  CARDIOVASCULAR: No chest pain. No shortness of breath. No leg cramps.  NEUROLOGICAL: No headaches. No vision changes.    PHYSICAL EXAM:  PELVIC: EGBUS within normal limits, normal vagina and vulva, atrophic vaginal changes noted. There is a separation of the right labia minora. Stitch likely popped during postpartum period. Area is non-tender and does not bother her.     PROCEDURES: None        Diagnosis:  1. Routine postpartum follow-up        Plan:            Patient was counseled today on A.C.S. Pap guidelines and recommendations for yearly pelvic exams and monthly self breast exams; to see her PCP for other health maintenance.    FOLLOW UP: in 1 year for routine exam; pap due in 2020      "

## 2019-12-09 ENCOUNTER — PATIENT MESSAGE (OUTPATIENT)
Dept: OBSTETRICS AND GYNECOLOGY | Facility: CLINIC | Age: 34
End: 2019-12-09

## 2020-02-03 PROBLEM — Z37.9 NORMAL LABOR: Status: RESOLVED | Noted: 2019-10-31 | Resolved: 2020-02-03

## 2020-02-17 ENCOUNTER — PATIENT MESSAGE (OUTPATIENT)
Dept: OBSTETRICS AND GYNECOLOGY | Facility: CLINIC | Age: 35
End: 2020-02-17

## 2020-02-19 ENCOUNTER — PATIENT MESSAGE (OUTPATIENT)
Dept: OBSTETRICS AND GYNECOLOGY | Facility: CLINIC | Age: 35
End: 2020-02-19

## 2020-02-19 ENCOUNTER — TELEPHONE (OUTPATIENT)
Dept: OBSTETRICS AND GYNECOLOGY | Facility: CLINIC | Age: 35
End: 2020-02-19

## 2020-02-19 NOTE — TELEPHONE ENCOUNTER
----- Message from James Monet sent at 2/19/2020  8:32 AM CST -----  Contact: Patient  Patient has been having pain in nipples for 3 months. Patient would like to be seen sometime this week, I was unable to schedule for this week. The schedule had pushed me out until early April pt can't wait that long. I also tried NP , March was too far out for pt. Patient phone number is [phone]. Thanks!

## 2020-02-19 NOTE — TELEPHONE ENCOUNTER
"----- Message from Rosi Delvalle sent at 2/19/2020 11:05 AM CST -----  Contact: RAFA GERARD [[id number]]  Name of Who is Calling: RAFA GERARD [[id number]]      What is the request in detail:   Patient called stating,  "she's returning your call and would like you to please call again."       THANKS!      Reply by [facility]: no      Call Back: RAFA GERARD // # [phone]                                      "

## 2020-02-19 NOTE — TELEPHONE ENCOUNTER
Spoke to pt and she states that this all started after she returned home Sunday from a work trip where she was only pumping to come home and try to feed from the breast to have her child unable to feed and she saw some bright red blood.     She states that her baby has a strong latch and is teething at this time and that she is concerned that she is having at this time as it isn't getting better something else going on. Gave appointment for tomorrow morning in the walk in clinic to have this evaluated and treated. Patient agreed and states she will be here.

## 2020-02-20 ENCOUNTER — OFFICE VISIT (OUTPATIENT)
Dept: OBSTETRICS AND GYNECOLOGY | Facility: CLINIC | Age: 35
End: 2020-02-20
Payer: COMMERCIAL

## 2020-02-20 VITALS
SYSTOLIC BLOOD PRESSURE: 112 MMHG | HEIGHT: 66 IN | WEIGHT: 158.75 LBS | BODY MASS INDEX: 25.51 KG/M2 | DIASTOLIC BLOOD PRESSURE: 70 MMHG

## 2020-02-20 DIAGNOSIS — N64.0 CRACKED NIPPLE: Primary | ICD-10-CM

## 2020-02-20 PROCEDURE — 99214 PR OFFICE/OUTPT VISIT, EST, LEVL IV, 30-39 MIN: ICD-10-PCS | Mod: S$GLB,,, | Performed by: NURSE PRACTITIONER

## 2020-02-20 PROCEDURE — 99214 OFFICE O/P EST MOD 30 MIN: CPT | Mod: S$GLB,,, | Performed by: NURSE PRACTITIONER

## 2020-02-20 PROCEDURE — 99999 PR PBB SHADOW E&M-EST. PATIENT-LVL III: ICD-10-PCS | Mod: PBBFAC,,, | Performed by: NURSE PRACTITIONER

## 2020-02-20 PROCEDURE — 99999 PR PBB SHADOW E&M-EST. PATIENT-LVL III: CPT | Mod: PBBFAC,,, | Performed by: NURSE PRACTITIONER

## 2020-02-20 PROCEDURE — 3008F BODY MASS INDEX DOCD: CPT | Mod: CPTII,S$GLB,, | Performed by: NURSE PRACTITIONER

## 2020-02-20 PROCEDURE — 3008F PR BODY MASS INDEX (BMI) DOCUMENTED: ICD-10-PCS | Mod: CPTII,S$GLB,, | Performed by: NURSE PRACTITIONER

## 2020-02-20 NOTE — PROGRESS NOTES
History & Physical  Gynecology      SUBJECTIVE:     Chief Complaint: Cracked Nipple    History of Present Illness: Patient presents to the [facility] for evaluation of possible cracked left nipple that began on . Reports painful opening at left nipple with small amount of bleeding, pain with latching at nipple. Denies exudate/drainage, erythema, swelling, fever/chills, body aches, or nausea/vomiting. She is currently breastfeeding.        Review of patient's allergies indicates:   Allergen Reactions    Cimzia [certolizumab pegol]      Lupus like reactions       Past Medical History:   Diagnosis Date    Crohn disease     Ulcerative colitis, unspecified      Past Surgical History:   Procedure Laterality Date    APPENDECTOMY      DILATION AND CURETTAGE OF UTERUS USING SUCTION N/A 2018    Procedure: DILATION AND CURETTAGE, UTERUS, USING SUCTION;  Surgeon: Consuelo Glover MD;  Location: [facility];  Service: OB/GYN;  Laterality: N/A;    EAR TUBE REMOVAL Bilateral     FOOT SURGERY Right     WISDOM TOOTH EXTRACTION       OB History        2    Para   1    Term   1       0    AB   1    Living   1       SAB   1    TAB   0    Ectopic   0    Multiple   0    Live Births   1               Family History   Problem Relation Age of Onset    Colon cancer Paternal Uncle     Breast cancer Neg Hx     Ovarian cancer Neg Hx      Social History     Tobacco Use    Smoking status: Never Smoker    Smokeless tobacco: Never Used   Substance Use Topics    Alcohol use: Not Currently     Comment: socially    Drug use: No       Current Outpatient Medications   Medication Sig    CANASA 1,000 mg Supp INSERT 1 SUPPOSITORY AT BEDTIME FOR 30 DAYS    clindamycin (CLEOCIN T) 1 % external solution APPLY TO SCALP DAILY AS NEEDED    cyanocobalamin (VITAMIN B-12) 1000 MCG tablet Take 100 mcg by mouth once daily.    fish oil-omega-3 fatty acids 300-1,000 mg capsule Take 2 g by mouth once daily.    FLUCELVAX QUAD  [id number], PF, 60 mcg (15 mcg x 4)/0.5 mL Syrg vaccine TO BE ADMINISTERED BY PHARMACIST FOR IMMUNIZATION    FLUZONE QUAD [id number], PF, 60 mcg (15 mcg x 4)/0.5 mL Syrg TO BE ADMINISTERED BY PHARMACIST FOR IMMUNIZATION    folic acid (FOLVITE) 400 MCG tablet     ibuprofen (ADVIL,MOTRIN) 600 MG tablet Take 1 tablet (600 mg total) by mouth every 6 (six) hours. (Patient not taking: Reported on 12/6/2019)    maribel diana ointment Apply topically 3 (three) times daily. Apply after feeding. Do not wash off. This compounded medication expires in 30 days.    mesalamine (ASACOL) 800 mg TbEC Take 1,600 mg by mouth 2 (two) times daily.    multivitamin capsule Take 1 capsule by mouth once daily.    maribel diana ointment Apply topically 3 (three) times daily. Apply after feeding. Do not wash off. This compounded medication expires in 30 days. (Patient not taking: Reported on 12/6/2019)    ondansetron (ZOFRAN-ODT) 4 MG TbDL Take 1 tablet (4 mg total) by mouth every 6 (six) hours as needed (nausea).     No current facility-administered medications for this visit.          Review of Systems:  Review of Systems   Constitutional: Negative for activity change, appetite change, chills, fatigue, fever and unexpected weight change.   HENT: Negative for nasal congestion and mouth sores.    Eyes: Negative for discharge and visual disturbance.   Respiratory: Negative for shortness of breath and wheezing.    Cardiovascular: Negative for chest pain, palpitations and leg swelling.   Gastrointestinal: Negative for abdominal pain, constipation, diarrhea, nausea, vomiting and reflux.   Endocrine: Negative for diabetes, hair loss, hot flashes, hyperthyroidism and hypothyroidism.   Genitourinary: Negative for bladder incontinence, decreased libido, dysmenorrhea, dyspareunia, dysuria, flank pain, frequency, genital sores, hematuria, hot flashes, menorrhagia, menstrual problem, pelvic pain, urgency, vaginal bleeding, vaginal discharge, vaginal  pain, urinary incontinence, postcoital bleeding, postmenopausal bleeding, vaginal dryness and vaginal odor.   Musculoskeletal: Negative for arthralgias, back pain, joint swelling, leg pain and myalgias.   Integumentary:  Positive for breast skin changes. Negative for rash, acne, hair changes, mole/lesion, breast mass, nipple discharge and breast tenderness.   Neurological: Negative for vertigo, seizures, syncope, numbness and headaches.   Hematological: Negative for adenopathy. Does not bruise/bleed easily.   Psychiatric/Behavioral: Negative for depression and sleep disturbance. The patient is not nervous/anxious.    Breast: Positive for skin changes.Negative for asymmetry, breast self exam, lump, mass, mastodynia, nipple discharge and tenderness       OBJECTIVE:     Physical Exam:  Physical Exam   Constitutional: She is oriented to person, place, and time. She appears well-developed and well-nourished. No distress.   HENT:   Head: Normocephalic and atraumatic.   Nose: Nose normal.   Mouth/Throat: Oropharynx is clear and moist.   Eyes: Pupils are equal, round, and reactive to light. Conjunctivae and EOM are normal.   Neck: Normal range of motion. Neck supple. No JVD present. No tracheal deviation present. No thyromegaly present.   Cardiovascular: Normal rate, regular rhythm and intact distal pulses. Exam reveals no gallop and no friction rub.   No murmur heard.  Pulmonary/Chest: Effort normal. No stridor. No respiratory distress. She has no wheezes. She has no rales. She exhibits no tenderness.   Abdominal: Soft. She exhibits no distension and no mass. There is no tenderness. There is no rebound and no guarding. No hernia.   Genitourinary: Vagina normal and uterus normal.   Musculoskeletal: Normal range of motion. She exhibits no edema or tenderness.   Lymphadenopathy:     She has no cervical adenopathy.   Neurological: She is alert and oriented to person, place, and time. She displays normal reflexes. No sensory  deficit. She exhibits normal muscle tone. Coordination normal.   Skin: Skin is warm and dry. Capillary refill takes less than 2 seconds. No rash noted. She is not diaphoretic. No erythema. No pallor.        Psychiatric: She has a normal mood and affect. Her behavior is normal. Judgment and thought content normal.   Nursing note and vitals reviewed.        ASSESSMENT:       ICD-10-CM ICD-9-CM    1. Cracked nipple N64.0 611.2 maribel diana ointment          Plan:      Will prescribe Maribel Diana's ointment three times daily for treatment of cracked nipple. Encouraged to wear nipple shield with breastfeeding. Instructed to notify clinic for any new or worsening symptoms, verbalized understanding.   Counseling time: 15 minutes      Seema Palma, MARY-C

## 2020-02-26 ENCOUNTER — PATIENT MESSAGE (OUTPATIENT)
Dept: OBSTETRICS AND GYNECOLOGY | Facility: CLINIC | Age: 35
End: 2020-02-26

## 2020-04-14 ENCOUNTER — PATIENT MESSAGE (OUTPATIENT)
Dept: OBSTETRICS AND GYNECOLOGY | Facility: CLINIC | Age: 35
End: 2020-04-14

## 2020-05-07 ENCOUNTER — PATIENT MESSAGE (OUTPATIENT)
Dept: OBSTETRICS AND GYNECOLOGY | Facility: CLINIC | Age: 35
End: 2020-05-07

## 2020-09-21 ENCOUNTER — OFFICE VISIT (OUTPATIENT)
Dept: OBSTETRICS AND GYNECOLOGY | Facility: CLINIC | Age: 35
End: 2020-09-21
Payer: COMMERCIAL

## 2020-09-21 VITALS
HEIGHT: 66 IN | BODY MASS INDEX: 27.14 KG/M2 | SYSTOLIC BLOOD PRESSURE: 116 MMHG | WEIGHT: 168.88 LBS | DIASTOLIC BLOOD PRESSURE: 70 MMHG

## 2020-09-21 DIAGNOSIS — Z01.419 ENCOUNTER FOR GYNECOLOGICAL EXAMINATION: Primary | ICD-10-CM

## 2020-09-21 DIAGNOSIS — Z12.4 PAP SMEAR FOR CERVICAL CANCER SCREENING: ICD-10-CM

## 2020-09-21 DIAGNOSIS — Z11.51 ENCOUNTER FOR SCREENING FOR HUMAN PAPILLOMAVIRUS (HPV): ICD-10-CM

## 2020-09-21 PROCEDURE — 99395 PREV VISIT EST AGE 18-39: CPT | Mod: S$GLB,,, | Performed by: OBSTETRICS & GYNECOLOGY

## 2020-09-21 PROCEDURE — 88141 PR  CYTOPATH CERV/VAG INTERPRET: ICD-10-PCS | Mod: ,,, | Performed by: PATHOLOGY

## 2020-09-21 PROCEDURE — 99999 PR PBB SHADOW E&M-EST. PATIENT-LVL III: CPT | Mod: PBBFAC,,, | Performed by: OBSTETRICS & GYNECOLOGY

## 2020-09-21 PROCEDURE — 88141 CYTOPATH C/V INTERPRET: CPT | Mod: ,,, | Performed by: PATHOLOGY

## 2020-09-21 PROCEDURE — 99395 PR PREVENTIVE VISIT,EST,18-39: ICD-10-PCS | Mod: S$GLB,,, | Performed by: OBSTETRICS & GYNECOLOGY

## 2020-09-21 PROCEDURE — 87624 HPV HI-RISK TYP POOLED RSLT: CPT

## 2020-09-21 PROCEDURE — 88175 CYTOPATH C/V AUTO FLUID REDO: CPT | Performed by: PATHOLOGY

## 2020-09-21 PROCEDURE — 99999 PR PBB SHADOW E&M-EST. PATIENT-LVL III: ICD-10-PCS | Mod: PBBFAC,,, | Performed by: OBSTETRICS & GYNECOLOGY

## 2020-09-21 NOTE — PROGRESS NOTES
HPI: Pt is a 35 y.o.  female who presents for routine annual exam. She uses nothing for contraception. She does not want STD screening. No complaints.     Is currently at the tail end of her 1st menses back since delivery. She is still currently breastfeeding but has significantly weaned down to 2 feedings/day. Will be attempting pregnancy again soon.       ROS:  GENERAL: Feeling well overall. Denies fever or chills.   SKIN: Denies rash or lesions.   HEAD: Denies head injury or headache.   NODES: Denies enlarged lymph nodes.   CHEST: Denies chest pain or shortness of breath.   CARDIOVASCULAR: Denies palpitations or left sided chest pain.   ABDOMEN: No abdominal pain, constipation, diarrhea, nausea, vomiting or rectal bleeding.   URINARY: No dysuria, hematuria, or burning on urination.  REPRODUCTIVE: See HPI.   BREASTS: Denies pain, lumps, or nipple discharge.   HEMATOLOGIC: No easy bruisability or excessive bleeding.   MUSCULOSKELETAL: Denies joint pain or swelling.   NEUROLOGIC: Denies syncope or weakness.   PSYCHIATRIC: Denies depression, anxiety or mood swings.    PE:   APPEARANCE: Well nourished, well developed, White female in no acute distress.  NODES: no cervical, supraclavicular, or inguinal lymphadenopathy  BREASTS: Symmetrical, no skin changes or visible lesions. No palpable masses, nipple discharge or adenopathy bilaterally.  ABDOMEN: Soft. No tenderness or masses. No distention. No hernias palpated. No CVA tenderness.  VULVA: No lesions. Normal external female genitalia.  URETHRAL MEATUS: Normal size and location, no lesions, no prolapse.  URETHRA: No masses, tenderness, or prolapse.  VAGINA: Moist. No lesions or lacerations noted. No abnormal discharge present. No odor present.   CERVIX: No lesions or discharge. No cervical motion tenderness.   UTERUS: Normal size, regular shape, mobile, non-tender.  ADNEXA: No tenderness. No fullness or masses palpated in the adnexal regions.   ANUS PERINEUM:  Normal.      Diagnosis:  1. Encounter for gynecological examination    2. Pap smear for cervical cancer screening    3. Encounter for screening for human papillomavirus (HPV)        Plan:     Orders Placed This Encounter    HPV High Risk Genotypes, PCR    Liquid-Based Pap Smear, Screening       Patient was counseled today on the new ACS guidelines for cervical cytology screening as well as the current recommendations for breast cancer screening. She was counseled to follow up with her PCP for other routine health maintenance. Counseling session lasted approximately 10 minutes, and all her questions were answered.    Follow-up with me in 1 year for routine exam or sooner if pregnant; pap in 3 years.

## 2020-09-24 LAB
HPV HR 12 DNA SPEC QL NAA+PROBE: NEGATIVE
HPV16 AG SPEC QL: NEGATIVE
HPV18 DNA SPEC QL NAA+PROBE: NEGATIVE

## 2020-09-30 ENCOUNTER — PATIENT MESSAGE (OUTPATIENT)
Dept: OBSTETRICS AND GYNECOLOGY | Facility: CLINIC | Age: 35
End: 2020-09-30

## 2020-10-18 ENCOUNTER — PATIENT MESSAGE (OUTPATIENT)
Dept: OBSTETRICS AND GYNECOLOGY | Facility: CLINIC | Age: 35
End: 2020-10-18

## 2020-10-18 DIAGNOSIS — N91.2 AMENORRHEA: Primary | ICD-10-CM

## 2020-10-19 ENCOUNTER — PATIENT MESSAGE (OUTPATIENT)
Dept: OBSTETRICS AND GYNECOLOGY | Facility: CLINIC | Age: 35
End: 2020-10-19

## 2020-10-19 ENCOUNTER — LAB VISIT (OUTPATIENT)
Dept: LAB | Facility: HOSPITAL | Age: 35
End: 2020-10-19
Attending: OBSTETRICS & GYNECOLOGY
Payer: COMMERCIAL

## 2020-10-19 DIAGNOSIS — N91.2 AMENORRHEA: ICD-10-CM

## 2020-10-19 LAB — HCG INTACT+B SERPL-ACNC: 100 MIU/ML

## 2020-10-19 PROCEDURE — 84144 ASSAY OF PROGESTERONE: CPT

## 2020-10-19 PROCEDURE — 36415 COLL VENOUS BLD VENIPUNCTURE: CPT | Mod: PN

## 2020-10-19 PROCEDURE — 84702 CHORIONIC GONADOTROPIN TEST: CPT

## 2020-10-20 LAB — PROGEST SERPL-MCNC: 17.8 NG/ML

## 2020-10-21 ENCOUNTER — LAB VISIT (OUTPATIENT)
Dept: LAB | Facility: HOSPITAL | Age: 35
End: 2020-10-21
Attending: OBSTETRICS & GYNECOLOGY
Payer: COMMERCIAL

## 2020-10-21 DIAGNOSIS — N91.2 AMENORRHEA: ICD-10-CM

## 2020-10-21 PROCEDURE — 84144 ASSAY OF PROGESTERONE: CPT

## 2020-10-21 PROCEDURE — 84702 CHORIONIC GONADOTROPIN TEST: CPT

## 2020-10-21 PROCEDURE — 36415 COLL VENOUS BLD VENIPUNCTURE: CPT | Mod: PN

## 2020-10-22 LAB
HCG INTACT+B SERPL-ACNC: 335 MIU/ML
PROGEST SERPL-MCNC: 21.4 NG/ML

## 2020-10-30 ENCOUNTER — PATIENT MESSAGE (OUTPATIENT)
Dept: OBSTETRICS AND GYNECOLOGY | Facility: CLINIC | Age: 35
End: 2020-10-30

## 2020-11-03 ENCOUNTER — TELEPHONE (OUTPATIENT)
Dept: OBSTETRICS AND GYNECOLOGY | Facility: CLINIC | Age: 35
End: 2020-11-03

## 2020-11-03 DIAGNOSIS — Z36.3 ENCOUNTER FOR ANTENATAL SCREENING FOR MALFORMATION USING ULTRASOUND: Primary | ICD-10-CM

## 2020-11-07 ENCOUNTER — PATIENT MESSAGE (OUTPATIENT)
Dept: OBSTETRICS AND GYNECOLOGY | Facility: CLINIC | Age: 35
End: 2020-11-07

## 2020-11-17 ENCOUNTER — PROCEDURE VISIT (OUTPATIENT)
Dept: OBSTETRICS AND GYNECOLOGY | Facility: CLINIC | Age: 35
End: 2020-11-17
Payer: COMMERCIAL

## 2020-11-17 DIAGNOSIS — Z36.89 ESTABLISH GESTATIONAL AGE, ULTRASOUND: ICD-10-CM

## 2020-11-17 DIAGNOSIS — Z36.3 ENCOUNTER FOR ANTENATAL SCREENING FOR MALFORMATION USING ULTRASOUND: ICD-10-CM

## 2020-11-17 PROCEDURE — 76801 OB US < 14 WKS SINGLE FETUS: CPT | Mod: 26,S$GLB,, | Performed by: OBSTETRICS & GYNECOLOGY

## 2020-11-17 PROCEDURE — 76801 PR US, OB <14WKS, TRANSABD, SINGLE GESTATION: ICD-10-PCS | Mod: 26,S$GLB,, | Performed by: OBSTETRICS & GYNECOLOGY

## 2020-11-24 ENCOUNTER — LAB VISIT (OUTPATIENT)
Dept: LAB | Facility: HOSPITAL | Age: 35
End: 2020-11-24
Attending: NURSE PRACTITIONER
Payer: COMMERCIAL

## 2020-11-24 ENCOUNTER — INITIAL PRENATAL (OUTPATIENT)
Dept: OBSTETRICS AND GYNECOLOGY | Facility: CLINIC | Age: 35
End: 2020-11-24
Payer: COMMERCIAL

## 2020-11-24 VITALS
WEIGHT: 163.38 LBS | BODY MASS INDEX: 26.37 KG/M2 | SYSTOLIC BLOOD PRESSURE: 116 MMHG | DIASTOLIC BLOOD PRESSURE: 64 MMHG

## 2020-11-24 DIAGNOSIS — Z3A.08 8 WEEKS GESTATION OF PREGNANCY: ICD-10-CM

## 2020-11-24 DIAGNOSIS — Z3A.08 8 WEEKS GESTATION OF PREGNANCY: Primary | ICD-10-CM

## 2020-11-24 LAB
ABO + RH BLD: NORMAL
BASOPHILS # BLD AUTO: 0.02 K/UL (ref 0–0.2)
BASOPHILS NFR BLD: 0.3 % (ref 0–1.9)
BLD GP AB SCN CELLS X3 SERPL QL: NORMAL
DIFFERENTIAL METHOD: NORMAL
EOSINOPHIL # BLD AUTO: 0.1 K/UL (ref 0–0.5)
EOSINOPHIL NFR BLD: 0.8 % (ref 0–8)
ERYTHROCYTE [DISTWIDTH] IN BLOOD BY AUTOMATED COUNT: 11.8 % (ref 11.5–14.5)
HCT VFR BLD AUTO: 38.5 % (ref 37–48.5)
HGB BLD-MCNC: 12.5 G/DL (ref 12–16)
IMM GRANULOCYTES # BLD AUTO: 0.02 K/UL (ref 0–0.04)
IMM GRANULOCYTES NFR BLD AUTO: 0.3 % (ref 0–0.5)
LYMPHOCYTES # BLD AUTO: 1.6 K/UL (ref 1–4.8)
LYMPHOCYTES NFR BLD: 25.3 % (ref 18–48)
MCH RBC QN AUTO: 30.9 PG (ref 27–31)
MCHC RBC AUTO-ENTMCNC: 32.5 G/DL (ref 32–36)
MCV RBC AUTO: 95 FL (ref 82–98)
MONOCYTES # BLD AUTO: 0.5 K/UL (ref 0.3–1)
MONOCYTES NFR BLD: 7.6 % (ref 4–15)
NEUTROPHILS # BLD AUTO: 4.2 K/UL (ref 1.8–7.7)
NEUTROPHILS NFR BLD: 65.7 % (ref 38–73)
NRBC BLD-RTO: 0 /100 WBC
PLATELET # BLD AUTO: 258 K/UL (ref 150–350)
PMV BLD AUTO: 10.4 FL (ref 9.2–12.9)
RBC # BLD AUTO: 4.05 M/UL (ref 4–5.4)
WBC # BLD AUTO: 6.33 K/UL (ref 3.9–12.7)

## 2020-11-24 PROCEDURE — 0502F SUBSEQUENT PRENATAL CARE: CPT | Mod: CPTII,S$GLB,, | Performed by: NURSE PRACTITIONER

## 2020-11-24 PROCEDURE — 85025 COMPLETE CBC W/AUTO DIFF WBC: CPT

## 2020-11-24 PROCEDURE — 87086 URINE CULTURE/COLONY COUNT: CPT

## 2020-11-24 PROCEDURE — 86901 BLOOD TYPING SEROLOGIC RH(D): CPT

## 2020-11-24 PROCEDURE — 99999 PR PBB SHADOW E&M-EST. PATIENT-LVL III: ICD-10-PCS | Mod: PBBFAC,,, | Performed by: NURSE PRACTITIONER

## 2020-11-24 PROCEDURE — 36415 COLL VENOUS BLD VENIPUNCTURE: CPT | Mod: PN

## 2020-11-24 PROCEDURE — 86592 SYPHILIS TEST NON-TREP QUAL: CPT

## 2020-11-24 PROCEDURE — 86762 RUBELLA ANTIBODY: CPT

## 2020-11-24 PROCEDURE — 86703 HIV-1/HIV-2 1 RESULT ANTBDY: CPT

## 2020-11-24 PROCEDURE — 99999 PR PBB SHADOW E&M-EST. PATIENT-LVL III: CPT | Mod: PBBFAC,,, | Performed by: NURSE PRACTITIONER

## 2020-11-24 PROCEDURE — 87340 HEPATITIS B SURFACE AG IA: CPT

## 2020-11-24 PROCEDURE — 0502F PR SUBSEQUENT PRENATAL CARE: ICD-10-PCS | Mod: CPTII,S$GLB,, | Performed by: NURSE PRACTITIONER

## 2020-11-24 RX ORDER — CETIRIZINE HYDROCHLORIDE 10 MG/1
10 TABLET ORAL DAILY
COMMUNITY
End: 2023-09-20

## 2020-11-24 NOTE — PROGRESS NOTES
Here for routine OB appt at 8w2d.  No complaints, denies VB and cramping.  Dating US done 20- WNL.   H/o  x 1 2019.   H/o ulcerative colitis, on mesalamine. Taking extra folic acid supplement.     Routine prenatal labs drawn today  Discussed options for aneuploidy screening; pamphlets given. Pt to think about it and let us know at next visit.   Pain, bleeding, and ED precautions given.  F/U scheduled 4 weeks with Dr. Glover

## 2020-11-25 LAB
HBV SURFACE AG SERPL QL IA: NEGATIVE
HIV 1+2 AB+HIV1 P24 AG SERPL QL IA: NEGATIVE
RPR SER QL: NORMAL
RUBV IGG SER-ACNC: 14.5 IU/ML
RUBV IGG SER-IMP: REACTIVE

## 2020-11-26 ENCOUNTER — PATIENT MESSAGE (OUTPATIENT)
Dept: OBSTETRICS AND GYNECOLOGY | Facility: CLINIC | Age: 35
End: 2020-11-26

## 2020-11-26 LAB — BACTERIA UR CULT: NO GROWTH

## 2020-11-27 NOTE — TELEPHONE ENCOUNTER
Spoke w pt regarding bleeding.. pt states 0 pain, and having a slight amount of pinkish blood this morning. Advised pt to monitor blood and pain levels, if pain or bleeding were to worsen to go to the ob ed. Pt verbalized understanding.

## 2020-11-30 ENCOUNTER — HOSPITAL ENCOUNTER (EMERGENCY)
Facility: OTHER | Age: 35
Discharge: HOME | End: 2020-11-30
Attending: OBSTETRICS & GYNECOLOGY
Payer: COMMERCIAL

## 2020-11-30 ENCOUNTER — NURSE TRIAGE (OUTPATIENT)
Dept: ADMINISTRATIVE | Facility: CLINIC | Age: 35
End: 2020-11-30

## 2020-11-30 ENCOUNTER — TELEPHONE (OUTPATIENT)
Dept: OBSTETRICS AND GYNECOLOGY | Facility: CLINIC | Age: 35
End: 2020-11-30

## 2020-11-30 VITALS
HEART RATE: 78 BPM | OXYGEN SATURATION: 98 % | RESPIRATION RATE: 17 BRPM | DIASTOLIC BLOOD PRESSURE: 79 MMHG | SYSTOLIC BLOOD PRESSURE: 120 MMHG

## 2020-11-30 DIAGNOSIS — O03.9 COMPLETE ABORTION: Primary | ICD-10-CM

## 2020-11-30 PROCEDURE — 99284 PR EMERGENCY DEPT VISIT,LEVEL IV: ICD-10-PCS | Mod: ,,, | Performed by: OBSTETRICS & GYNECOLOGY

## 2020-11-30 PROCEDURE — 99281 EMR DPT VST MAYX REQ PHY/QHP: CPT

## 2020-11-30 PROCEDURE — 99284 EMERGENCY DEPT VISIT MOD MDM: CPT | Mod: ,,, | Performed by: OBSTETRICS & GYNECOLOGY

## 2020-11-30 NOTE — TELEPHONE ENCOUNTER
Pt states a that it has been confirmed as a SAB and that she has passed all tissue and bleeding has slowed down. Ultrasound was done in [place] and she is coping well. Pt advised that if she needs anything to reach out and contact us and she was very thank full

## 2020-11-30 NOTE — TELEPHONE ENCOUNTER
Tried to contact patient a few times and was unsuccessful. Upon further review it seems as patient has went to [place] to be checked as documented in messages last week for her to do if bleeding has become heavier

## 2020-11-30 NOTE — ED NOTES
Pt awaiting significant other to arrive to be discharged home, no distress noted   Patient baseline mental status

## 2020-11-30 NOTE — TELEPHONE ENCOUNTER
9 weeks pregnant, started bleeding,spoke with NP earlier in the week and was told only to come to ED if she was soaking through pads.Today she is passing a lot of blood, filling toilet, does not see any tissue at this time, ED advised, verb understanding.     Reason for Disposition   MODERATE vaginal bleeding (e.g., soaking 1 pad) and present > 6 hours    Additional Information   Negative: Shock suspected (e.g., cold/pale/clammy skin, too weak to stand, low BP, rapid pulse)   Negative: Difficult to awaken or acting confused (e.g., disoriented, slurred speech)   Negative: Passed out (i.e., fainted, collapsed and was not responding)   Negative: Sounds like a life-threatening emergency to the triager   Negative: Vaginal bleeding and pregnant > 20 weeks   Negative: Not pregnant or pregnancy status unknown   Negative: SEVERE abdominal pain (e.g., excruciating)   Negative: SEVERE vaginal bleeding (e.g., soaking 2 pads / hour, large blood clots) and present for 2 or more hours   Negative: SEVERE dizziness (e.g., unable to stand, requires support to walk, feels like passing out)    Protocols used: PREGNANCY - VAGINAL BLEEDING LESS THAN 20 WEEKS EGA-A-[place]

## 2020-11-30 NOTE — ED PROVIDER NOTES
Encounter Date: 2020       History     Chief Complaint   Patient presents with    Vaginal Bleeding     HPI   Alisa Herrera is a 35 y.o. [id number] at 9w1d presents complaining of vaginal bleeding. She has been bleeding since Thursday but never a pad an hour. Until 12 pm today where she sat on the toilet and said she had a lot of bleeding and feels she had a miscarriage. She then had another large gush when she got out of her car here ruining her pants.    Dating ultrasound on  shows confirmed IUP at 7 weeks with heart tone and small chorionic hemorrhage.    Review of patient's allergies indicates:   Allergen Reactions    Cimzia [certolizumab pegol]      Lupus like reactions     Past Medical History:   Diagnosis Date    Crohn disease     Ulcerative colitis, unspecified      Past Surgical History:   Procedure Laterality Date    APPENDECTOMY      DILATION AND CURETTAGE OF UTERUS USING SUCTION N/A 2018    Procedure: DILATION AND CURETTAGE, UTERUS, USING SUCTION;  Surgeon: Consuelo Glover MD;  Location: [facility];  Service: OB/GYN;  Laterality: N/A;    EAR TUBE REMOVAL Bilateral     FOOT SURGERY Right     WISDOM TOOTH EXTRACTION       Family History   Problem Relation Age of Onset    Colon cancer Paternal Uncle     Breast cancer Neg Hx     Ovarian cancer Neg Hx      Social History     Tobacco Use    Smoking status: Never Smoker    Smokeless tobacco: Never Used   Substance Use Topics    Alcohol use: Not Currently     Comment: socially    Drug use: No     Review of Systems   Constitutional: Negative for fever.   HENT: Negative for sore throat.    Eyes: Negative for visual disturbance.   Respiratory: Negative for shortness of breath.    Cardiovascular: Negative for chest pain.   Gastrointestinal: Negative for nausea.   Genitourinary: Positive for vaginal bleeding (heavy). Negative for dysuria.   Musculoskeletal: Negative for back pain.   Skin: Negative for rash.    Neurological: Positive for light-headedness (on and off, but felt safe enough to drive here). Negative for dizziness, weakness and headaches.   Hematological: Does not bruise/bleed easily.       Physical Exam     Initial Vitals   BP Pulse Resp Temp SpO2   11/30/20 1320 11/30/20 1319 11/30/20 1320 -- 11/30/20 1319   120/79 90 17  98 %      MAP       --                Physical Exam    Vitals reviewed.  Constitutional: Vital signs are normal. She appears well-developed and well-nourished. Breathing: Normal.   HENT:   Head: Normocephalic and atraumatic.   Eyes: EOM are normal.   Neck: Normal range of motion.   Cardiovascular: Normal rate, intact distal pulses and normal pulses.   Pulmonary/Chest:   Normal work of breathing   Abdominal: Soft. She exhibits no distension. There is no abdominal tenderness.   Neurological: She is alert and oriented to person, place, and time. She has normal strength.   Skin: Skin is warm and dry.   Psychiatric: Her behavior is normal.     OB LABOR EXAM:                       Comments: SSE with Dr. Sepulveda at bedside.   Thin maroon  Blood present in vagina. Clots removed. Cervix visualized, clot present at the cervical os. No evidence of product.        ED Course   Procedures  Labs Reviewed - No data to display       Imaging Results    None     TVUS: 1.1cm endometrial stripe w/ heterogenous material, clear endocervical canal; no gestational sac; no fetal pole     Medical Decision Making:   ED Management:  VSS  Patient had confirmed IUP at 7 weeks gestation  Patient HPI consistent with miscarriage  Physical exam as above; bleeding significantly slowed and no active bleeding noted on SSE  Transvaginal ultrasound showed endometrial stripe of 1.1 cm. No evidence of gestational sac or fetal pole  Pt counseled on three options of management: expectant, medical, surgical. Based on history, physical exam and ultrasound findings discussed with patient that minimal clot appears to be left in the  uterus. Believe she has passed the majority of the miscarriage as a complete .  Pt agrees with expectant management.  Will message primary OBGYN for follow up  Rh positive, no need for rhogam  Other:   I have discussed this case with another health care provider.       <> Summary of the Discussion: Dr. Sepulveda                   ED Course as of 1719   Mon 2020   1418 SSE: no active bleeding, removed 20cc of clot  TVUS: 1cm stripe with heterogenous material, no e/o retained GS/fetal pole. Rh+. Will dC home with bleeding precautions.    [LB]      ED Course User Index  [LB] Kenia Sepulveda MD            Clinical Impression:     ICD-10-CM ICD-9-CM   1. Complete   O03.9 637.92                          ED Disposition Condition    Discharge Stable        ED Prescriptions     None        Follow-up Information    None                     Gabriella Berrios MD  OBGYN PGY-2                   Nina Berrios MD  Resident  20       Kenia Sepulveda MD  [phone]

## 2020-11-30 NOTE — TELEPHONE ENCOUNTER
----- Message from Zenaida Zepeda sent at 11/30/2020  8:12 AM CST -----  Who Called: RAFA GERARD is the reqeust in detail:Patient stated that she spoke with Tashi FINLEY in regards to bleeding on last Friday morning. She said the bleeding is heavier and called the OB ED on this weekend but was told she didn't need to come in. Patient would like a call back to discuss if she should come in or wait it out. Please adviseCan the clinic reply by [facility]?: YesBest Call Back Number: [phone]

## 2020-12-01 ENCOUNTER — TELEPHONE (OUTPATIENT)
Dept: OBSTETRICS AND GYNECOLOGY | Facility: CLINIC | Age: 35
End: 2020-12-01

## 2020-12-01 DIAGNOSIS — O03.9 COMPLETE ABORTION: Primary | ICD-10-CM

## 2020-12-01 NOTE — TELEPHONE ENCOUNTER
Spoke with patient regarding miscarriage yesterday. Still having some bleeding but much better.     Will send for BHCG on Friday and follow until negative.     Pt expressed understanding and all questions answered.

## 2020-12-04 ENCOUNTER — LAB VISIT (OUTPATIENT)
Dept: LAB | Facility: HOSPITAL | Age: 35
End: 2020-12-04
Attending: OBSTETRICS & GYNECOLOGY
Payer: COMMERCIAL

## 2020-12-04 DIAGNOSIS — O03.9 COMPLETE ABORTION: ICD-10-CM

## 2020-12-04 PROCEDURE — 36415 COLL VENOUS BLD VENIPUNCTURE: CPT | Mod: PN

## 2020-12-04 PROCEDURE — 84702 CHORIONIC GONADOTROPIN TEST: CPT

## 2020-12-05 ENCOUNTER — PATIENT MESSAGE (OUTPATIENT)
Dept: OBSTETRICS AND GYNECOLOGY | Facility: CLINIC | Age: 35
End: 2020-12-05

## 2020-12-05 LAB — HCG INTACT+B SERPL-ACNC: 1535 MIU/ML

## 2020-12-11 ENCOUNTER — LAB VISIT (OUTPATIENT)
Dept: LAB | Facility: HOSPITAL | Age: 35
End: 2020-12-11
Attending: OBSTETRICS & GYNECOLOGY
Payer: COMMERCIAL

## 2020-12-11 DIAGNOSIS — O03.9 COMPLETE ABORTION: ICD-10-CM

## 2020-12-11 PROCEDURE — 84702 CHORIONIC GONADOTROPIN TEST: CPT

## 2020-12-11 PROCEDURE — 36415 COLL VENOUS BLD VENIPUNCTURE: CPT | Mod: PN

## 2020-12-12 LAB — HCG INTACT+B SERPL-ACNC: 127 MIU/ML

## 2020-12-18 ENCOUNTER — LAB VISIT (OUTPATIENT)
Dept: LAB | Facility: HOSPITAL | Age: 35
End: 2020-12-18
Attending: OBSTETRICS & GYNECOLOGY
Payer: COMMERCIAL

## 2020-12-18 DIAGNOSIS — O03.9 COMPLETE ABORTION: ICD-10-CM

## 2020-12-18 LAB — HCG INTACT+B SERPL-ACNC: 26 MIU/ML

## 2020-12-18 PROCEDURE — 36415 COLL VENOUS BLD VENIPUNCTURE: CPT | Mod: PN

## 2020-12-18 PROCEDURE — 84702 CHORIONIC GONADOTROPIN TEST: CPT

## 2020-12-22 ENCOUNTER — OFFICE VISIT (OUTPATIENT)
Dept: OBSTETRICS AND GYNECOLOGY | Facility: CLINIC | Age: 35
End: 2020-12-22
Payer: COMMERCIAL

## 2020-12-22 VITALS
BODY MASS INDEX: 26.53 KG/M2 | DIASTOLIC BLOOD PRESSURE: 64 MMHG | WEIGHT: 164.38 LBS | SYSTOLIC BLOOD PRESSURE: 114 MMHG

## 2020-12-22 DIAGNOSIS — N76.0 ACUTE VAGINITIS: Primary | ICD-10-CM

## 2020-12-22 PROCEDURE — 3008F PR BODY MASS INDEX (BMI) DOCUMENTED: ICD-10-PCS | Mod: CPTII,S$GLB,, | Performed by: PHYSICIAN ASSISTANT

## 2020-12-22 PROCEDURE — 99999 PR PBB SHADOW E&M-EST. PATIENT-LVL III: ICD-10-PCS | Mod: PBBFAC,,, | Performed by: PHYSICIAN ASSISTANT

## 2020-12-22 PROCEDURE — 99214 PR OFFICE/OUTPT VISIT, EST, LEVL IV, 30-39 MIN: ICD-10-PCS | Mod: S$GLB,,, | Performed by: PHYSICIAN ASSISTANT

## 2020-12-22 PROCEDURE — 3008F BODY MASS INDEX DOCD: CPT | Mod: CPTII,S$GLB,, | Performed by: PHYSICIAN ASSISTANT

## 2020-12-22 PROCEDURE — 87480 CANDIDA DNA DIR PROBE: CPT

## 2020-12-22 PROCEDURE — 1126F PR PAIN SEVERITY QUANTIFIED, NO PAIN PRESENT: ICD-10-PCS | Mod: S$GLB,,, | Performed by: PHYSICIAN ASSISTANT

## 2020-12-22 PROCEDURE — 99214 OFFICE O/P EST MOD 30 MIN: CPT | Mod: S$GLB,,, | Performed by: PHYSICIAN ASSISTANT

## 2020-12-22 PROCEDURE — 87510 GARDNER VAG DNA DIR PROBE: CPT

## 2020-12-22 PROCEDURE — 99999 PR PBB SHADOW E&M-EST. PATIENT-LVL III: CPT | Mod: PBBFAC,,, | Performed by: PHYSICIAN ASSISTANT

## 2020-12-22 PROCEDURE — 1126F AMNT PAIN NOTED NONE PRSNT: CPT | Mod: S$GLB,,, | Performed by: PHYSICIAN ASSISTANT

## 2020-12-22 RX ORDER — FLUCONAZOLE 150 MG/1
150 TABLET ORAL ONCE
Qty: 2 TABLET | Refills: 1 | Status: SHIPPED | OUTPATIENT
Start: 2020-12-22 | End: 2020-12-22

## 2020-12-22 NOTE — PROGRESS NOTES
Alisa Herrera is a 35 y.o. female  presents with complaint of vaginal discharge for 2 days. She states the discharge is white.   She reports itching. She denies odor.  Denies nausea, vomiting, diarrhea, constipation, dysuria, dyspareunia, abdominal pain. She is not breastfeeding, recent miscarriage. No other concerns or complaints at this visit.     Past Medical History:   Diagnosis Date    Crohn disease     Ulcerative colitis, unspecified      Past Surgical History:   Procedure Laterality Date    APPENDECTOMY      DILATION AND CURETTAGE OF UTERUS USING SUCTION N/A 2018    Procedure: DILATION AND CURETTAGE, UTERUS, USING SUCTION;  Surgeon: Consuelo Glover MD;  Location: [facility];  Service: OB/GYN;  Laterality: N/A;    EAR TUBE REMOVAL Bilateral     FOOT SURGERY Right     WISDOM TOOTH EXTRACTION       Social History     Tobacco Use    Smoking status: Never Smoker    Smokeless tobacco: Never Used   Substance Use Topics    Alcohol use: Not Currently     Comment: socially    Drug use: No     Family History   Problem Relation Age of Onset    Colon cancer Paternal Uncle     Breast cancer Neg Hx     Ovarian cancer Neg Hx      OB History    Para Term  AB Living   3 1 1 0 1 1   SAB TAB Ectopic Multiple Live Births   1 0 0 0 1      # Outcome Date GA Lbr De/2nd Weight Sex Delivery Anes PTL Lv   3             2 Term 19 39w6d 07:57 / 01:04 3.6 kg (7 lb 15 oz) F Vag-Spont EPI N FRIDA   1 SAB                /64   Wt 74.6 kg (164 lb 5.7 oz)   LMP 2020   Breastfeeding No   BMI 26.53 kg/m²     ROS:  GENERAL: No fever, chills, fatigability or weight loss.  VULVAR: No pain, no lesions and no itching.  VAGINAL: No relaxation, no itching, no discharge, no abnormal bleeding and no lesions.  ABDOMEN: No abdominal pain. Denies nausea. Denies vomiting. No diarrhea. No constipation  BREAST: Denies pain. No lumps. No discharge.  URINARY: No incontinence, no  nocturia, no frequency and no dysuria.  CARDIOVASCULAR: No chest pain. No shortness of breath. No leg cramps.  NEUROLOGICAL: No headaches. No vision changes.    PHYSICAL EXAM:  VULVA: normal appearing vulva with no masses, tenderness or lesions   VAGINA: normal appearing vagina with normal color. WHITE, THIN discharge, no lesions   CERVIX: normal appearing cervix without discharge or lesions   UTERUS: uterus is normal size, shape, consistency and nontender   ADNEXA: normal adnexa in size, nontender and no masses    ASSESSMENT and PLAN:    ICD-10-CM ICD-9-CM    1. Acute vaginitis  N76.0 616.10 Vaginosis Screen by DNA Probe      fluconazole (DIFLUCAN) 150 MG Tab       Vaginitis Prevention:  - Avoiding feminine products such as deoderant soaps, body wash, bubble bath, douches, scented toilet paper, deoderant tampons or pads, feminine wipes, chronic pad use, etc. If you wash with soap make sure its hypo allergic (free of added scents or colors)   - Avoiding other vulvovaginal irritants such as long hot baths, humidity, tight, synthetic clothing, chlorine and sitting around in wet bathing suits  - Wearing cotton underwear, avoiding thong underwear and no underwear to bed-wear loose cotton bottoms to bed   - Taking showers instead of baths and use a hair dryer on cool setting afterwards to dry  - Wearing cotton to exercise and shower immediately after exercise and change clothes  - Using polyurethane condoms without spermicide if sexually active and symptoms are triggered by intercourse  - Use laundry detergent without added perfumes or colors   - Do not have sexual intercourse until symptoms have gone away   - Increase your intake of probiotics--you can get these by eating yogurt/greek yogurt or by buying over the counter probiotic supplements     Probiotic Information:   Any probiotic you choose needs to have ALL of the following components (Each needs to be >10 colony forming units [CFUs]):   - L. Acidophilus  - L.  Rhamnosus  - L. Fermentum     Discussed possible BV treatment pending results. Pt is not breastfeeding.   Discussed repeat HCG levels per Dr. Glover's orders.     FOLLOW UP: PRN lack of improvement.

## 2020-12-23 ENCOUNTER — PATIENT MESSAGE (OUTPATIENT)
Dept: OBSTETRICS AND GYNECOLOGY | Facility: CLINIC | Age: 35
End: 2020-12-23

## 2020-12-23 DIAGNOSIS — B96.89 BV (BACTERIAL VAGINOSIS): Primary | ICD-10-CM

## 2020-12-23 DIAGNOSIS — N76.0 BV (BACTERIAL VAGINOSIS): Primary | ICD-10-CM

## 2020-12-23 LAB
CANDIDA RRNA VAG QL PROBE: NEGATIVE
G VAGINALIS RRNA GENITAL QL PROBE: POSITIVE
T VAGINALIS RRNA GENITAL QL PROBE: NEGATIVE

## 2020-12-23 RX ORDER — TINIDAZOLE 500 MG/1
2 TABLET ORAL DAILY
Qty: 8 TABLET | Refills: 0 | Status: SHIPPED | OUTPATIENT
Start: 2020-12-23 | End: 2020-12-25

## 2021-01-02 ENCOUNTER — PATIENT MESSAGE (OUTPATIENT)
Dept: OBSTETRICS AND GYNECOLOGY | Facility: CLINIC | Age: 36
End: 2021-01-02

## 2021-01-08 ENCOUNTER — LAB VISIT (OUTPATIENT)
Dept: LAB | Facility: HOSPITAL | Age: 36
End: 2021-01-08
Attending: OBSTETRICS & GYNECOLOGY
Payer: COMMERCIAL

## 2021-01-08 DIAGNOSIS — O03.9 COMPLETE ABORTION: ICD-10-CM

## 2021-01-08 PROCEDURE — 84702 CHORIONIC GONADOTROPIN TEST: CPT

## 2021-01-08 PROCEDURE — 36415 COLL VENOUS BLD VENIPUNCTURE: CPT | Mod: PN

## 2021-01-09 LAB — HCG INTACT+B SERPL-ACNC: 1.4 MIU/ML

## 2021-02-25 ENCOUNTER — TELEPHONE (OUTPATIENT)
Dept: OBSTETRICS AND GYNECOLOGY | Facility: CLINIC | Age: 36
End: 2021-02-25

## 2021-03-24 ENCOUNTER — PATIENT MESSAGE (OUTPATIENT)
Dept: OBSTETRICS AND GYNECOLOGY | Facility: CLINIC | Age: 36
End: 2021-03-24

## 2021-03-26 ENCOUNTER — PATIENT MESSAGE (OUTPATIENT)
Dept: OBSTETRICS AND GYNECOLOGY | Facility: CLINIC | Age: 36
End: 2021-03-26

## 2021-03-30 ENCOUNTER — TELEPHONE (OUTPATIENT)
Dept: OBSTETRICS AND GYNECOLOGY | Facility: CLINIC | Age: 36
End: 2021-03-30

## 2021-03-30 ENCOUNTER — PATIENT MESSAGE (OUTPATIENT)
Dept: OBSTETRICS AND GYNECOLOGY | Facility: CLINIC | Age: 36
End: 2021-03-30

## 2021-03-30 DIAGNOSIS — Z34.90 PREGNANCY: Primary | ICD-10-CM

## 2021-04-08 ENCOUNTER — TELEPHONE (OUTPATIENT)
Dept: OBSTETRICS AND GYNECOLOGY | Facility: CLINIC | Age: 36
End: 2021-04-08

## 2021-04-09 ENCOUNTER — OFFICE VISIT (OUTPATIENT)
Dept: OBSTETRICS AND GYNECOLOGY | Facility: CLINIC | Age: 36
End: 2021-04-09
Payer: COMMERCIAL

## 2021-04-09 VITALS
DIASTOLIC BLOOD PRESSURE: 70 MMHG | BODY MASS INDEX: 27.35 KG/M2 | WEIGHT: 170.19 LBS | SYSTOLIC BLOOD PRESSURE: 118 MMHG | HEIGHT: 66 IN

## 2021-04-09 DIAGNOSIS — Z3A.01 LESS THAN 8 WEEKS GESTATION OF PREGNANCY: ICD-10-CM

## 2021-04-09 DIAGNOSIS — N89.8 VAGINAL DISCHARGE: Primary | ICD-10-CM

## 2021-04-09 PROCEDURE — 99213 PR OFFICE/OUTPT VISIT, EST, LEVL III, 20-29 MIN: ICD-10-PCS | Mod: S$GLB,,, | Performed by: ADVANCED PRACTICE MIDWIFE

## 2021-04-09 PROCEDURE — 87491 CHLMYD TRACH DNA AMP PROBE: CPT | Performed by: ADVANCED PRACTICE MIDWIFE

## 2021-04-09 PROCEDURE — 99999 PR PBB SHADOW E&M-EST. PATIENT-LVL III: ICD-10-PCS | Mod: PBBFAC,,, | Performed by: ADVANCED PRACTICE MIDWIFE

## 2021-04-09 PROCEDURE — 87591 N.GONORRHOEAE DNA AMP PROB: CPT | Performed by: ADVANCED PRACTICE MIDWIFE

## 2021-04-09 PROCEDURE — 87481 CANDIDA DNA AMP PROBE: CPT | Mod: 59 | Performed by: ADVANCED PRACTICE MIDWIFE

## 2021-04-09 PROCEDURE — 1126F AMNT PAIN NOTED NONE PRSNT: CPT | Mod: S$GLB,,, | Performed by: ADVANCED PRACTICE MIDWIFE

## 2021-04-09 PROCEDURE — 3008F PR BODY MASS INDEX (BMI) DOCUMENTED: ICD-10-PCS | Mod: CPTII,S$GLB,, | Performed by: ADVANCED PRACTICE MIDWIFE

## 2021-04-09 PROCEDURE — 3008F BODY MASS INDEX DOCD: CPT | Mod: CPTII,S$GLB,, | Performed by: ADVANCED PRACTICE MIDWIFE

## 2021-04-09 PROCEDURE — 1126F PR PAIN SEVERITY QUANTIFIED, NO PAIN PRESENT: ICD-10-PCS | Mod: S$GLB,,, | Performed by: ADVANCED PRACTICE MIDWIFE

## 2021-04-09 PROCEDURE — 99999 PR PBB SHADOW E&M-EST. PATIENT-LVL III: CPT | Mod: PBBFAC,,, | Performed by: ADVANCED PRACTICE MIDWIFE

## 2021-04-09 PROCEDURE — 99213 OFFICE O/P EST LOW 20 MIN: CPT | Mod: S$GLB,,, | Performed by: ADVANCED PRACTICE MIDWIFE

## 2021-04-13 ENCOUNTER — PATIENT MESSAGE (OUTPATIENT)
Dept: OBSTETRICS AND GYNECOLOGY | Facility: CLINIC | Age: 36
End: 2021-04-13

## 2021-04-13 LAB
BACTERIAL VAGINOSIS DNA: NEGATIVE
C TRACH DNA SPEC QL NAA+PROBE: NOT DETECTED
CANDIDA GLABRATA DNA: NEGATIVE
CANDIDA KRUSEI DNA: NEGATIVE
CANDIDA RRNA VAG QL PROBE: POSITIVE
N GONORRHOEA DNA SPEC QL NAA+PROBE: NOT DETECTED
T VAGINALIS RRNA GENITAL QL PROBE: NEGATIVE

## 2021-04-14 ENCOUNTER — PATIENT MESSAGE (OUTPATIENT)
Dept: OBSTETRICS AND GYNECOLOGY | Facility: CLINIC | Age: 36
End: 2021-04-14

## 2021-04-23 ENCOUNTER — OFFICE VISIT (OUTPATIENT)
Dept: OBSTETRICS AND GYNECOLOGY | Facility: CLINIC | Age: 36
End: 2021-04-23
Payer: COMMERCIAL

## 2021-04-23 ENCOUNTER — LAB VISIT (OUTPATIENT)
Dept: LAB | Facility: OTHER | Age: 36
End: 2021-04-23
Attending: ADVANCED PRACTICE MIDWIFE
Payer: COMMERCIAL

## 2021-04-23 ENCOUNTER — PROCEDURE VISIT (OUTPATIENT)
Dept: OBSTETRICS AND GYNECOLOGY | Facility: CLINIC | Age: 36
End: 2021-04-23
Payer: COMMERCIAL

## 2021-04-23 VITALS
WEIGHT: 173.5 LBS | BODY MASS INDEX: 27.88 KG/M2 | SYSTOLIC BLOOD PRESSURE: 120 MMHG | HEIGHT: 66 IN | DIASTOLIC BLOOD PRESSURE: 80 MMHG

## 2021-04-23 DIAGNOSIS — Z32.01 POSITIVE PREGNANCY TEST: Primary | ICD-10-CM

## 2021-04-23 DIAGNOSIS — Z32.01 POSITIVE PREGNANCY TEST: ICD-10-CM

## 2021-04-23 DIAGNOSIS — Z34.90 PREGNANCY: ICD-10-CM

## 2021-04-23 DIAGNOSIS — Z36.89 ESTABLISH GESTATIONAL AGE, ULTRASOUND: Primary | ICD-10-CM

## 2021-04-23 LAB
ABO + RH BLD: NORMAL
ANION GAP SERPL CALC-SCNC: 10 MMOL/L (ref 8–16)
B-HCG UR QL: POSITIVE
BASOPHILS # BLD AUTO: 0.02 K/UL (ref 0–0.2)
BASOPHILS NFR BLD: 0.2 % (ref 0–1.9)
BLD GP AB SCN CELLS X3 SERPL QL: NORMAL
BUN SERPL-MCNC: 9 MG/DL (ref 6–20)
CALCIUM SERPL-MCNC: 9.1 MG/DL (ref 8.7–10.5)
CHLORIDE SERPL-SCNC: 104 MMOL/L (ref 95–110)
CO2 SERPL-SCNC: 23 MMOL/L (ref 23–29)
CREAT SERPL-MCNC: 0.6 MG/DL (ref 0.5–1.4)
CTP QC/QA: YES
DIFFERENTIAL METHOD: ABNORMAL
EOSINOPHIL # BLD AUTO: 0 K/UL (ref 0–0.5)
EOSINOPHIL NFR BLD: 0.4 % (ref 0–8)
ERYTHROCYTE [DISTWIDTH] IN BLOOD BY AUTOMATED COUNT: 11.9 % (ref 11.5–14.5)
EST. GFR  (AFRICAN AMERICAN): >60 ML/MIN/1.73 M^2
EST. GFR  (NON AFRICAN AMERICAN): >60 ML/MIN/1.73 M^2
GLUCOSE SERPL-MCNC: 81 MG/DL (ref 70–110)
HCT VFR BLD AUTO: 36.2 % (ref 37–48.5)
HGB BLD-MCNC: 12.6 G/DL (ref 12–16)
IMM GRANULOCYTES # BLD AUTO: 0.03 K/UL (ref 0–0.04)
IMM GRANULOCYTES NFR BLD AUTO: 0.4 % (ref 0–0.5)
LYMPHOCYTES # BLD AUTO: 1.8 K/UL (ref 1–4.8)
LYMPHOCYTES NFR BLD: 21.7 % (ref 18–48)
MCH RBC QN AUTO: 31.7 PG (ref 27–31)
MCHC RBC AUTO-ENTMCNC: 34.8 G/DL (ref 32–36)
MCV RBC AUTO: 91 FL (ref 82–98)
MONOCYTES # BLD AUTO: 0.6 K/UL (ref 0.3–1)
MONOCYTES NFR BLD: 6.8 % (ref 4–15)
NEUTROPHILS # BLD AUTO: 5.8 K/UL (ref 1.8–7.7)
NEUTROPHILS NFR BLD: 70.5 % (ref 38–73)
NRBC BLD-RTO: 0 /100 WBC
PLATELET # BLD AUTO: 259 K/UL (ref 150–450)
PMV BLD AUTO: 10.3 FL (ref 9.2–12.9)
POTASSIUM SERPL-SCNC: 3.8 MMOL/L (ref 3.5–5.1)
RBC # BLD AUTO: 3.97 M/UL (ref 4–5.4)
SODIUM SERPL-SCNC: 137 MMOL/L (ref 136–145)
WBC # BLD AUTO: 8.2 K/UL (ref 3.9–12.7)

## 2021-04-23 PROCEDURE — 99213 OFFICE O/P EST LOW 20 MIN: CPT | Mod: 25,S$GLB,, | Performed by: ADVANCED PRACTICE MIDWIFE

## 2021-04-23 PROCEDURE — 86592 SYPHILIS TEST NON-TREP QUAL: CPT | Performed by: ADVANCED PRACTICE MIDWIFE

## 2021-04-23 PROCEDURE — 87340 HEPATITIS B SURFACE AG IA: CPT | Performed by: ADVANCED PRACTICE MIDWIFE

## 2021-04-23 PROCEDURE — 81025 URINE PREGNANCY TEST: CPT | Mod: S$GLB,,, | Performed by: ADVANCED PRACTICE MIDWIFE

## 2021-04-23 PROCEDURE — 1126F PR PAIN SEVERITY QUANTIFIED, NO PAIN PRESENT: ICD-10-PCS | Mod: S$GLB,,, | Performed by: ADVANCED PRACTICE MIDWIFE

## 2021-04-23 PROCEDURE — 1126F AMNT PAIN NOTED NONE PRSNT: CPT | Mod: S$GLB,,, | Performed by: ADVANCED PRACTICE MIDWIFE

## 2021-04-23 PROCEDURE — 99999 PR PBB SHADOW E&M-EST. PATIENT-LVL III: CPT | Mod: PBBFAC,,, | Performed by: ADVANCED PRACTICE MIDWIFE

## 2021-04-23 PROCEDURE — 85025 COMPLETE CBC W/AUTO DIFF WBC: CPT | Performed by: ADVANCED PRACTICE MIDWIFE

## 2021-04-23 PROCEDURE — 76801 OB US < 14 WKS SINGLE FETUS: CPT | Mod: S$GLB,,, | Performed by: OBSTETRICS & GYNECOLOGY

## 2021-04-23 PROCEDURE — 76801 PR US, OB <14WKS, TRANSABD, SINGLE GESTATION: ICD-10-PCS | Mod: S$GLB,,, | Performed by: OBSTETRICS & GYNECOLOGY

## 2021-04-23 PROCEDURE — 86900 BLOOD TYPING SEROLOGIC ABO: CPT | Performed by: ADVANCED PRACTICE MIDWIFE

## 2021-04-23 PROCEDURE — 99999 PR PBB SHADOW E&M-EST. PATIENT-LVL III: ICD-10-PCS | Mod: PBBFAC,,, | Performed by: ADVANCED PRACTICE MIDWIFE

## 2021-04-23 PROCEDURE — 86703 HIV-1/HIV-2 1 RESULT ANTBDY: CPT | Performed by: ADVANCED PRACTICE MIDWIFE

## 2021-04-23 PROCEDURE — 3008F PR BODY MASS INDEX (BMI) DOCUMENTED: ICD-10-PCS | Mod: CPTII,S$GLB,, | Performed by: ADVANCED PRACTICE MIDWIFE

## 2021-04-23 PROCEDURE — 3008F BODY MASS INDEX DOCD: CPT | Mod: CPTII,S$GLB,, | Performed by: ADVANCED PRACTICE MIDWIFE

## 2021-04-23 PROCEDURE — 86762 RUBELLA ANTIBODY: CPT | Performed by: ADVANCED PRACTICE MIDWIFE

## 2021-04-23 PROCEDURE — 80048 BASIC METABOLIC PNL TOTAL CA: CPT | Performed by: ADVANCED PRACTICE MIDWIFE

## 2021-04-23 PROCEDURE — 99213 PR OFFICE/OUTPT VISIT, EST, LEVL III, 20-29 MIN: ICD-10-PCS | Mod: 25,S$GLB,, | Performed by: ADVANCED PRACTICE MIDWIFE

## 2021-04-23 PROCEDURE — 36415 COLL VENOUS BLD VENIPUNCTURE: CPT | Performed by: ADVANCED PRACTICE MIDWIFE

## 2021-04-23 PROCEDURE — 81025 POCT URINE PREGNANCY: ICD-10-PCS | Mod: S$GLB,,, | Performed by: ADVANCED PRACTICE MIDWIFE

## 2021-04-26 LAB
HBV SURFACE AG SERPL QL IA: NEGATIVE
HIV 1+2 AB+HIV1 P24 AG SERPL QL IA: NEGATIVE
RPR SER QL: NORMAL
RUBV IGG SER-ACNC: 14.4 IU/ML
RUBV IGG SER-IMP: REACTIVE

## 2021-04-28 ENCOUNTER — PATIENT MESSAGE (OUTPATIENT)
Dept: RESEARCH | Facility: HOSPITAL | Age: 36
End: 2021-04-28

## 2021-05-03 ENCOUNTER — PATIENT MESSAGE (OUTPATIENT)
Dept: OBSTETRICS AND GYNECOLOGY | Facility: CLINIC | Age: 36
End: 2021-05-03

## 2021-05-17 ENCOUNTER — INITIAL PRENATAL (OUTPATIENT)
Dept: OBSTETRICS AND GYNECOLOGY | Facility: CLINIC | Age: 36
End: 2021-05-17
Payer: COMMERCIAL

## 2021-05-17 ENCOUNTER — PATIENT MESSAGE (OUTPATIENT)
Dept: ADMINISTRATIVE | Facility: OTHER | Age: 36
End: 2021-05-17

## 2021-05-17 VITALS
DIASTOLIC BLOOD PRESSURE: 72 MMHG | BODY MASS INDEX: 27.93 KG/M2 | WEIGHT: 173.06 LBS | SYSTOLIC BLOOD PRESSURE: 120 MMHG

## 2021-05-17 DIAGNOSIS — Z36.82 ENCOUNTER FOR NUCHAL TRANSLUCENCY TESTING: Primary | ICD-10-CM

## 2021-05-17 PROCEDURE — 99999 PR PBB SHADOW E&M-EST. PATIENT-LVL III: CPT | Mod: PBBFAC,,, | Performed by: OBSTETRICS & GYNECOLOGY

## 2021-05-17 PROCEDURE — 0500F PR INITIAL PRENATAL CARE VISIT: ICD-10-PCS | Mod: S$GLB,,, | Performed by: OBSTETRICS & GYNECOLOGY

## 2021-05-17 PROCEDURE — 0500F INITIAL PRENATAL CARE VISIT: CPT | Mod: S$GLB,,, | Performed by: OBSTETRICS & GYNECOLOGY

## 2021-05-17 PROCEDURE — 99999 PR PBB SHADOW E&M-EST. PATIENT-LVL III: ICD-10-PCS | Mod: PBBFAC,,, | Performed by: OBSTETRICS & GYNECOLOGY

## 2021-05-18 ENCOUNTER — TELEPHONE (OUTPATIENT)
Dept: MATERNAL FETAL MEDICINE | Facility: CLINIC | Age: 36
End: 2021-05-18

## 2021-05-20 ENCOUNTER — PATIENT MESSAGE (OUTPATIENT)
Dept: ADMINISTRATIVE | Facility: OTHER | Age: 36
End: 2021-05-20

## 2021-05-27 ENCOUNTER — PROCEDURE VISIT (OUTPATIENT)
Dept: MATERNAL FETAL MEDICINE | Facility: CLINIC | Age: 36
End: 2021-05-27
Payer: COMMERCIAL

## 2021-05-27 ENCOUNTER — PATIENT MESSAGE (OUTPATIENT)
Dept: ADMINISTRATIVE | Facility: OTHER | Age: 36
End: 2021-05-27

## 2021-05-27 ENCOUNTER — LAB VISIT (OUTPATIENT)
Dept: LAB | Facility: OTHER | Age: 36
End: 2021-05-27
Attending: OBSTETRICS & GYNECOLOGY
Payer: COMMERCIAL

## 2021-05-27 VITALS — BODY MASS INDEX: 27.79 KG/M2 | WEIGHT: 172.19 LBS

## 2021-05-27 DIAGNOSIS — Z36.82 ENCOUNTER FOR NUCHAL TRANSLUCENCY TESTING: ICD-10-CM

## 2021-05-27 DIAGNOSIS — Z36.89 ENCOUNTER FOR FETAL ANATOMIC SURVEY: Primary | ICD-10-CM

## 2021-05-27 PROCEDURE — 36415 COLL VENOUS BLD VENIPUNCTURE: CPT | Performed by: OBSTETRICS & GYNECOLOGY

## 2021-05-27 PROCEDURE — 76813 PR US, OB NUCHAL, TRANSABDOM/TRANSVAG, FIRST GESTATION: ICD-10-PCS | Mod: S$GLB,,, | Performed by: OBSTETRICS & GYNECOLOGY

## 2021-05-27 PROCEDURE — 76813 OB US NUCHAL MEAS 1 GEST: CPT | Mod: S$GLB,,, | Performed by: OBSTETRICS & GYNECOLOGY

## 2021-05-27 PROCEDURE — 81508 FTL CGEN ABNOR TWO PROTEINS: CPT | Performed by: OBSTETRICS & GYNECOLOGY

## 2021-06-01 LAB
# FETUSES US: NORMAL
AGE AT DELIVERY: 35
B-HCG MOM SERPL: NORMAL
B-HCG SERPL-ACNC: 121.3 IU/ML
FET CRL US.MEAS: 78.6 MM
FET NASAL BONE LENGTH US.MEAS: NORMAL MM
FET NUCHAL FOLD MOM THICKNESS US.MEAS: NORMAL
FET NUCHAL FOLD THICKNESS US.MEAS: 1.4 MM
FET TS 21 RISK FROM MAT AGE: NORMAL
GA (DAYS): 6 D
GA (WEEKS): 13 WK
IDDM PATIENT QL: NORMAL
INTEGRATED SCN PATIENT-IMP NAR: NORMAL
INTEGRATED SCN PATIENT-IMP: NEGATIVE
PAPP-A MOM SERPL: NORMAL
PAPP-A SERPL-MCNC: NORMAL NG/ML
SMOKING STATUS FTND: NO
TS 18 RISK FETUS: NORMAL
TS 21 RISK FETUS: NORMAL
US DATE: NORMAL

## 2021-06-02 ENCOUNTER — PATIENT MESSAGE (OUTPATIENT)
Dept: OBSTETRICS AND GYNECOLOGY | Facility: CLINIC | Age: 36
End: 2021-06-02

## 2021-06-10 ENCOUNTER — TELEPHONE (OUTPATIENT)
Dept: OBSTETRICS AND GYNECOLOGY | Facility: CLINIC | Age: 36
End: 2021-06-10

## 2021-06-21 ENCOUNTER — LAB VISIT (OUTPATIENT)
Dept: LAB | Facility: HOSPITAL | Age: 36
End: 2021-06-21
Attending: OBSTETRICS & GYNECOLOGY
Payer: COMMERCIAL

## 2021-06-21 ENCOUNTER — ROUTINE PRENATAL (OUTPATIENT)
Dept: OBSTETRICS AND GYNECOLOGY | Facility: CLINIC | Age: 36
End: 2021-06-21
Payer: COMMERCIAL

## 2021-06-21 VITALS
SYSTOLIC BLOOD PRESSURE: 118 MMHG | BODY MASS INDEX: 28.66 KG/M2 | DIASTOLIC BLOOD PRESSURE: 74 MMHG | WEIGHT: 177.56 LBS

## 2021-06-21 DIAGNOSIS — Z36.9 ANTENATAL SCREENING ENCOUNTER: ICD-10-CM

## 2021-06-21 DIAGNOSIS — Z34.80 SUPERVISION OF OTHER NORMAL PREGNANCY, ANTEPARTUM: Primary | ICD-10-CM

## 2021-06-21 PROCEDURE — 81511 FTL CGEN ABNOR FOUR ANAL: CPT | Performed by: OBSTETRICS & GYNECOLOGY

## 2021-06-21 PROCEDURE — 99999 PR PBB SHADOW E&M-EST. PATIENT-LVL III: CPT | Mod: PBBFAC,,, | Performed by: OBSTETRICS & GYNECOLOGY

## 2021-06-21 PROCEDURE — 0502F PR SUBSEQUENT PRENATAL CARE: ICD-10-PCS | Mod: CPTII,S$GLB,, | Performed by: OBSTETRICS & GYNECOLOGY

## 2021-06-21 PROCEDURE — 0502F SUBSEQUENT PRENATAL CARE: CPT | Mod: CPTII,S$GLB,, | Performed by: OBSTETRICS & GYNECOLOGY

## 2021-06-21 PROCEDURE — 99999 PR PBB SHADOW E&M-EST. PATIENT-LVL III: ICD-10-PCS | Mod: PBBFAC,,, | Performed by: OBSTETRICS & GYNECOLOGY

## 2021-06-21 PROCEDURE — 36415 COLL VENOUS BLD VENIPUNCTURE: CPT | Mod: PN | Performed by: OBSTETRICS & GYNECOLOGY

## 2021-06-25 LAB
# FETUSES US: NORMAL
AFP MOM SERPL: 1.1
AFP SERPL-MCNC: 40.2 NG/ML
AGE AT DELIVERY: 35
B-HCG MOM SERPL: 2.3
B-HCG SERPL-ACNC: 53.2 IU/ML
COLLECT DATE BLD: NORMAL
COLLECT DATE: NORMAL
FET NASAL BONE LENGTH US.MEAS: NORMAL MM
FET NUCHAL FOLD MOM THICKNESS US.MEAS: 0.77
FET NUCHAL FOLD THICKNESS US.MEAS: 1.4 MM
FET TS 21 RISK FROM MAT AGE: NORMAL
GA (DAYS): 6 D
GA (WEEKS): 13 WK
GA METHOD: NORMAL
GEST. AGE (DAYS) 2ND SAMPLE (SS2): 3
GEST. AGE (WKS) 2ND SAMPLE (SS2): 17
IDDM PATIENT QL: NORMAL
INHIBIN A MOM SERPL: 0.9
INHIBIN A SERPL-MCNC: 115.3 PG/ML
INTEGRATED SCN PATIENT-IMP: NEGATIVE
PAPP-A MOM SERPL: 0.91
PAPP-A SERPL-MCNC: NORMAL NG/ML
SEQUENTIAL SCREEN PART 2 INTERP: NORMAL
SMOKING STATUS FTND: NO
TS 18 RISK FETUS: NORMAL
TS 21 RISK FETUS: NORMAL
U ESTRIOL MOM SERPL: 0.6
U ESTRIOL SERPL-MCNC: 0.79 NG/ML

## 2021-07-15 ENCOUNTER — PROCEDURE VISIT (OUTPATIENT)
Dept: MATERNAL FETAL MEDICINE | Facility: CLINIC | Age: 36
End: 2021-07-15
Payer: COMMERCIAL

## 2021-07-15 DIAGNOSIS — O09.522 MULTIGRAVIDA OF ADVANCED MATERNAL AGE IN SECOND TRIMESTER: ICD-10-CM

## 2021-07-15 DIAGNOSIS — Z36.89 ENCOUNTER FOR FETAL ANATOMIC SURVEY: ICD-10-CM

## 2021-07-15 PROCEDURE — 76811 PR US, OB FETAL EVAL & EXAM, TRANSABDOM,FIRST GESTATION: ICD-10-PCS | Mod: S$GLB,,, | Performed by: OBSTETRICS & GYNECOLOGY

## 2021-07-15 PROCEDURE — 76811 OB US DETAILED SNGL FETUS: CPT | Mod: S$GLB,,, | Performed by: OBSTETRICS & GYNECOLOGY

## 2021-07-16 ENCOUNTER — PATIENT MESSAGE (OUTPATIENT)
Dept: OBSTETRICS AND GYNECOLOGY | Facility: CLINIC | Age: 36
End: 2021-07-16

## 2021-08-10 ENCOUNTER — PATIENT MESSAGE (OUTPATIENT)
Dept: MATERNAL FETAL MEDICINE | Facility: CLINIC | Age: 36
End: 2021-08-10

## 2021-08-12 ENCOUNTER — PROCEDURE VISIT (OUTPATIENT)
Dept: MATERNAL FETAL MEDICINE | Facility: CLINIC | Age: 36
End: 2021-08-12
Payer: COMMERCIAL

## 2021-08-12 PROCEDURE — 76816 PR  US,PREGNANT UTERUS,F/U,TRANSABD APP: ICD-10-PCS | Mod: S$GLB,,, | Performed by: OBSTETRICS & GYNECOLOGY

## 2021-08-12 PROCEDURE — 76816 OB US FOLLOW-UP PER FETUS: CPT | Mod: S$GLB,,, | Performed by: OBSTETRICS & GYNECOLOGY

## 2021-08-16 DIAGNOSIS — Z36.89 ENCOUNTER FOR ULTRASOUND TO ASSESS FETAL GROWTH: Primary | ICD-10-CM

## 2021-08-16 DIAGNOSIS — O09.523 MULTIGRAVIDA OF ADVANCED MATERNAL AGE IN THIRD TRIMESTER: ICD-10-CM

## 2021-08-19 ENCOUNTER — PATIENT MESSAGE (OUTPATIENT)
Dept: ADMINISTRATIVE | Facility: OTHER | Age: 36
End: 2021-08-19

## 2021-08-20 ENCOUNTER — PATIENT MESSAGE (OUTPATIENT)
Dept: OBSTETRICS AND GYNECOLOGY | Facility: CLINIC | Age: 36
End: 2021-08-20

## 2021-08-20 ENCOUNTER — LAB VISIT (OUTPATIENT)
Dept: LAB | Facility: HOSPITAL | Age: 36
End: 2021-08-20
Attending: OBSTETRICS & GYNECOLOGY
Payer: COMMERCIAL

## 2021-08-20 ENCOUNTER — ROUTINE PRENATAL (OUTPATIENT)
Dept: OBSTETRICS AND GYNECOLOGY | Facility: CLINIC | Age: 36
End: 2021-08-20
Payer: COMMERCIAL

## 2021-08-20 VITALS
SYSTOLIC BLOOD PRESSURE: 108 MMHG | DIASTOLIC BLOOD PRESSURE: 70 MMHG | BODY MASS INDEX: 29.93 KG/M2 | WEIGHT: 185.44 LBS

## 2021-08-20 DIAGNOSIS — Z34.80 SUPERVISION OF OTHER NORMAL PREGNANCY, ANTEPARTUM: Primary | ICD-10-CM

## 2021-08-20 DIAGNOSIS — Z34.80 SUPERVISION OF OTHER NORMAL PREGNANCY, ANTEPARTUM: ICD-10-CM

## 2021-08-20 LAB
BASOPHILS # BLD AUTO: 0.03 K/UL (ref 0–0.2)
BASOPHILS NFR BLD: 0.3 % (ref 0–1.9)
DIFFERENTIAL METHOD: ABNORMAL
EOSINOPHIL # BLD AUTO: 0.1 K/UL (ref 0–0.5)
EOSINOPHIL NFR BLD: 0.6 % (ref 0–8)
ERYTHROCYTE [DISTWIDTH] IN BLOOD BY AUTOMATED COUNT: 13.2 % (ref 11.5–14.5)
GLUCOSE SERPL-MCNC: 101 MG/DL (ref 70–140)
HCT VFR BLD AUTO: 30.8 % (ref 37–48.5)
HGB BLD-MCNC: 10.1 G/DL (ref 12–16)
IMM GRANULOCYTES # BLD AUTO: 0.18 K/UL (ref 0–0.04)
IMM GRANULOCYTES NFR BLD AUTO: 1.8 % (ref 0–0.5)
LYMPHOCYTES # BLD AUTO: 1.9 K/UL (ref 1–4.8)
LYMPHOCYTES NFR BLD: 18.5 % (ref 18–48)
MCH RBC QN AUTO: 31.2 PG (ref 27–31)
MCHC RBC AUTO-ENTMCNC: 32.8 G/DL (ref 32–36)
MCV RBC AUTO: 95 FL (ref 82–98)
MONOCYTES # BLD AUTO: 0.8 K/UL (ref 0.3–1)
MONOCYTES NFR BLD: 7.9 % (ref 4–15)
NEUTROPHILS # BLD AUTO: 7.3 K/UL (ref 1.8–7.7)
NEUTROPHILS NFR BLD: 70.9 % (ref 38–73)
NRBC BLD-RTO: 0 /100 WBC
PLATELET # BLD AUTO: 243 K/UL (ref 150–450)
PMV BLD AUTO: 10.3 FL (ref 9.2–12.9)
RBC # BLD AUTO: 3.24 M/UL (ref 4–5.4)
WBC # BLD AUTO: 10.23 K/UL (ref 3.9–12.7)

## 2021-08-20 PROCEDURE — 85025 COMPLETE CBC W/AUTO DIFF WBC: CPT | Performed by: OBSTETRICS & GYNECOLOGY

## 2021-08-20 PROCEDURE — 99999 PR PBB SHADOW E&M-EST. PATIENT-LVL II: ICD-10-PCS | Mod: PBBFAC,,, | Performed by: OBSTETRICS & GYNECOLOGY

## 2021-08-20 PROCEDURE — 99999 PR PBB SHADOW E&M-EST. PATIENT-LVL II: CPT | Mod: PBBFAC,,, | Performed by: OBSTETRICS & GYNECOLOGY

## 2021-08-20 PROCEDURE — 36415 COLL VENOUS BLD VENIPUNCTURE: CPT | Mod: PN | Performed by: OBSTETRICS & GYNECOLOGY

## 2021-08-20 PROCEDURE — 82950 GLUCOSE TEST: CPT | Performed by: OBSTETRICS & GYNECOLOGY

## 2021-08-20 PROCEDURE — 0502F PR SUBSEQUENT PRENATAL CARE: ICD-10-PCS | Mod: CPTII,S$GLB,, | Performed by: OBSTETRICS & GYNECOLOGY

## 2021-08-20 PROCEDURE — 0502F SUBSEQUENT PRENATAL CARE: CPT | Mod: CPTII,S$GLB,, | Performed by: OBSTETRICS & GYNECOLOGY

## 2021-08-22 ENCOUNTER — PATIENT MESSAGE (OUTPATIENT)
Dept: OBSTETRICS AND GYNECOLOGY | Facility: CLINIC | Age: 36
End: 2021-08-22

## 2021-08-23 ENCOUNTER — PATIENT MESSAGE (OUTPATIENT)
Dept: OBSTETRICS AND GYNECOLOGY | Facility: CLINIC | Age: 36
End: 2021-08-23

## 2021-08-23 RX ORDER — FERROUS SULFATE 325(65) MG
325 TABLET, DELAYED RELEASE (ENTERIC COATED) ORAL DAILY
Qty: 30 TABLET | Refills: 3 | Status: SHIPPED | OUTPATIENT
Start: 2021-08-23 | End: 2021-12-15

## 2021-09-02 ENCOUNTER — TELEPHONE (OUTPATIENT)
Dept: OBSTETRICS AND GYNECOLOGY | Facility: CLINIC | Age: 36
End: 2021-09-02

## 2021-09-09 ENCOUNTER — PATIENT MESSAGE (OUTPATIENT)
Dept: ADMINISTRATIVE | Facility: OTHER | Age: 36
End: 2021-09-09

## 2021-09-10 ENCOUNTER — PATIENT MESSAGE (OUTPATIENT)
Dept: OBSTETRICS AND GYNECOLOGY | Facility: CLINIC | Age: 36
End: 2021-09-10

## 2021-09-17 ENCOUNTER — ROUTINE PRENATAL (OUTPATIENT)
Dept: OBSTETRICS AND GYNECOLOGY | Facility: CLINIC | Age: 36
End: 2021-09-17
Payer: COMMERCIAL

## 2021-09-17 VITALS
BODY MASS INDEX: 30.07 KG/M2 | WEIGHT: 186.31 LBS | DIASTOLIC BLOOD PRESSURE: 70 MMHG | SYSTOLIC BLOOD PRESSURE: 124 MMHG

## 2021-09-17 DIAGNOSIS — Z34.80 SUPERVISION OF OTHER NORMAL PREGNANCY, ANTEPARTUM: Primary | ICD-10-CM

## 2021-09-17 PROCEDURE — 99999 PR PBB SHADOW E&M-EST. PATIENT-LVL I: CPT | Mod: PBBFAC,,, | Performed by: OBSTETRICS & GYNECOLOGY

## 2021-09-17 PROCEDURE — 99999 PR PBB SHADOW E&M-EST. PATIENT-LVL I: ICD-10-PCS | Mod: PBBFAC,,, | Performed by: OBSTETRICS & GYNECOLOGY

## 2021-09-17 PROCEDURE — 0502F SUBSEQUENT PRENATAL CARE: CPT | Mod: CPTII,S$GLB,, | Performed by: OBSTETRICS & GYNECOLOGY

## 2021-09-17 PROCEDURE — 0502F PR SUBSEQUENT PRENATAL CARE: ICD-10-PCS | Mod: CPTII,S$GLB,, | Performed by: OBSTETRICS & GYNECOLOGY

## 2021-10-05 ENCOUNTER — PATIENT MESSAGE (OUTPATIENT)
Dept: OBSTETRICS AND GYNECOLOGY | Facility: CLINIC | Age: 36
End: 2021-10-05

## 2021-10-06 ENCOUNTER — PATIENT MESSAGE (OUTPATIENT)
Dept: MATERNAL FETAL MEDICINE | Facility: CLINIC | Age: 36
End: 2021-10-06

## 2021-10-07 ENCOUNTER — PROCEDURE VISIT (OUTPATIENT)
Dept: MATERNAL FETAL MEDICINE | Facility: CLINIC | Age: 36
End: 2021-10-07
Payer: COMMERCIAL

## 2021-10-07 ENCOUNTER — CLINICAL SUPPORT (OUTPATIENT)
Dept: OBSTETRICS AND GYNECOLOGY | Facility: CLINIC | Age: 36
End: 2021-10-07
Payer: COMMERCIAL

## 2021-10-07 ENCOUNTER — PATIENT MESSAGE (OUTPATIENT)
Dept: ADMINISTRATIVE | Facility: OTHER | Age: 36
End: 2021-10-07

## 2021-10-07 DIAGNOSIS — O09.523 MULTIGRAVIDA OF ADVANCED MATERNAL AGE IN THIRD TRIMESTER: ICD-10-CM

## 2021-10-07 DIAGNOSIS — Z36.89 ENCOUNTER FOR ULTRASOUND TO ASSESS FETAL GROWTH: ICD-10-CM

## 2021-10-07 PROCEDURE — 99999 PR PBB SHADOW E&M-EST. PATIENT-LVL I: CPT | Mod: PBBFAC,,,

## 2021-10-07 PROCEDURE — 76816 OB US FOLLOW-UP PER FETUS: CPT | Mod: PBBFAC | Performed by: OBSTETRICS & GYNECOLOGY

## 2021-10-07 PROCEDURE — 90471 IMMUNIZATION ADMIN: CPT | Mod: S$GLB,,, | Performed by: OBSTETRICS & GYNECOLOGY

## 2021-10-07 PROCEDURE — 90686 IIV4 VACC NO PRSV 0.5 ML IM: CPT | Mod: S$GLB,,, | Performed by: OBSTETRICS & GYNECOLOGY

## 2021-10-07 PROCEDURE — 90686 FLU VACCINE (QUAD) GREATER THAN OR EQUAL TO 3YO PRESERVATIVE FREE IM: ICD-10-PCS | Mod: S$GLB,,, | Performed by: OBSTETRICS & GYNECOLOGY

## 2021-10-07 PROCEDURE — 90471 FLU VACCINE (QUAD) GREATER THAN OR EQUAL TO 3YO PRESERVATIVE FREE IM: ICD-10-PCS | Mod: S$GLB,,, | Performed by: OBSTETRICS & GYNECOLOGY

## 2021-10-07 PROCEDURE — 99999 PR PBB SHADOW E&M-EST. PATIENT-LVL I: ICD-10-PCS | Mod: PBBFAC,,,

## 2021-10-11 ENCOUNTER — PATIENT MESSAGE (OUTPATIENT)
Dept: OBSTETRICS AND GYNECOLOGY | Facility: CLINIC | Age: 36
End: 2021-10-11

## 2021-10-29 ENCOUNTER — ROUTINE PRENATAL (OUTPATIENT)
Dept: OBSTETRICS AND GYNECOLOGY | Facility: CLINIC | Age: 36
End: 2021-10-29
Payer: COMMERCIAL

## 2021-10-29 ENCOUNTER — PATIENT MESSAGE (OUTPATIENT)
Dept: OBSTETRICS AND GYNECOLOGY | Facility: CLINIC | Age: 36
End: 2021-10-29
Payer: COMMERCIAL

## 2021-10-29 ENCOUNTER — LAB VISIT (OUTPATIENT)
Dept: LAB | Facility: HOSPITAL | Age: 36
End: 2021-10-29
Attending: OBSTETRICS & GYNECOLOGY
Payer: COMMERCIAL

## 2021-10-29 VITALS
SYSTOLIC BLOOD PRESSURE: 118 MMHG | BODY MASS INDEX: 30.69 KG/M2 | DIASTOLIC BLOOD PRESSURE: 70 MMHG | WEIGHT: 190.13 LBS

## 2021-10-29 DIAGNOSIS — Z34.80 SUPERVISION OF OTHER NORMAL PREGNANCY, ANTEPARTUM: ICD-10-CM

## 2021-10-29 DIAGNOSIS — Z34.80 SUPERVISION OF OTHER NORMAL PREGNANCY, ANTEPARTUM: Primary | ICD-10-CM

## 2021-10-29 LAB
BASOPHILS # BLD AUTO: 0.04 K/UL (ref 0–0.2)
BASOPHILS NFR BLD: 0.4 % (ref 0–1.9)
DIFFERENTIAL METHOD: ABNORMAL
EOSINOPHIL # BLD AUTO: 0.1 K/UL (ref 0–0.5)
EOSINOPHIL NFR BLD: 0.6 % (ref 0–8)
ERYTHROCYTE [DISTWIDTH] IN BLOOD BY AUTOMATED COUNT: 13.1 % (ref 11.5–14.5)
HCT VFR BLD AUTO: 33.6 % (ref 37–48.5)
HGB BLD-MCNC: 11 G/DL (ref 12–16)
IMM GRANULOCYTES # BLD AUTO: 0.4 K/UL (ref 0–0.04)
IMM GRANULOCYTES NFR BLD AUTO: 3.7 % (ref 0–0.5)
LYMPHOCYTES # BLD AUTO: 2.1 K/UL (ref 1–4.8)
LYMPHOCYTES NFR BLD: 19.1 % (ref 18–48)
MCH RBC QN AUTO: 31.1 PG (ref 27–31)
MCHC RBC AUTO-ENTMCNC: 32.7 G/DL (ref 32–36)
MCV RBC AUTO: 95 FL (ref 82–98)
MONOCYTES # BLD AUTO: 0.7 K/UL (ref 0.3–1)
MONOCYTES NFR BLD: 6.8 % (ref 4–15)
NEUTROPHILS # BLD AUTO: 7.6 K/UL (ref 1.8–7.7)
NEUTROPHILS NFR BLD: 69.4 % (ref 38–73)
NRBC BLD-RTO: 0 /100 WBC
PLATELET # BLD AUTO: 273 K/UL (ref 150–450)
PMV BLD AUTO: 11.3 FL (ref 9.2–12.9)
RBC # BLD AUTO: 3.54 M/UL (ref 4–5.4)
WBC # BLD AUTO: 10.9 K/UL (ref 3.9–12.7)

## 2021-10-29 PROCEDURE — 99999 PR PBB SHADOW E&M-EST. PATIENT-LVL I: ICD-10-PCS | Mod: PBBFAC,,, | Performed by: OBSTETRICS & GYNECOLOGY

## 2021-10-29 PROCEDURE — 86592 SYPHILIS TEST NON-TREP QUAL: CPT | Performed by: OBSTETRICS & GYNECOLOGY

## 2021-10-29 PROCEDURE — 0502F SUBSEQUENT PRENATAL CARE: CPT | Mod: CPTII,S$GLB,, | Performed by: OBSTETRICS & GYNECOLOGY

## 2021-10-29 PROCEDURE — 36415 COLL VENOUS BLD VENIPUNCTURE: CPT | Mod: PN | Performed by: OBSTETRICS & GYNECOLOGY

## 2021-10-29 PROCEDURE — 0502F PR SUBSEQUENT PRENATAL CARE: ICD-10-PCS | Mod: CPTII,S$GLB,, | Performed by: OBSTETRICS & GYNECOLOGY

## 2021-10-29 PROCEDURE — 99999 PR PBB SHADOW E&M-EST. PATIENT-LVL I: CPT | Mod: PBBFAC,,, | Performed by: OBSTETRICS & GYNECOLOGY

## 2021-10-29 PROCEDURE — 87081 CULTURE SCREEN ONLY: CPT | Performed by: OBSTETRICS & GYNECOLOGY

## 2021-10-29 PROCEDURE — 85025 COMPLETE CBC W/AUTO DIFF WBC: CPT | Performed by: OBSTETRICS & GYNECOLOGY

## 2021-10-29 PROCEDURE — 87389 HIV-1 AG W/HIV-1&-2 AB AG IA: CPT | Performed by: OBSTETRICS & GYNECOLOGY

## 2021-10-30 LAB — RPR SER QL: NORMAL

## 2021-11-01 LAB
BACTERIA SPEC AEROBE CULT: NORMAL
HIV 1+2 AB+HIV1 P24 AG SERPL QL IA: NEGATIVE

## 2021-11-03 ENCOUNTER — IMMUNIZATION (OUTPATIENT)
Dept: INTERNAL MEDICINE | Facility: CLINIC | Age: 36
End: 2021-11-03
Payer: COMMERCIAL

## 2021-11-03 DIAGNOSIS — Z23 NEED FOR VACCINATION: Primary | ICD-10-CM

## 2021-11-03 PROCEDURE — 0003A COVID-19, MRNA, LNP-S, PF, 30 MCG/0.3 ML DOSE VACCINE: CPT | Mod: PBBFAC | Performed by: INTERNAL MEDICINE

## 2021-11-03 PROCEDURE — 91300 COVID-19, MRNA, LNP-S, PF, 30 MCG/0.3 ML DOSE VACCINE: CPT | Mod: PBBFAC | Performed by: INTERNAL MEDICINE

## 2021-11-08 ENCOUNTER — PATIENT MESSAGE (OUTPATIENT)
Dept: OBSTETRICS AND GYNECOLOGY | Facility: CLINIC | Age: 36
End: 2021-11-08
Payer: COMMERCIAL

## 2021-11-12 ENCOUNTER — HOSPITAL ENCOUNTER (OUTPATIENT)
Facility: OTHER | Age: 36
Discharge: HOME | End: 2021-11-12
Attending: OBSTETRICS & GYNECOLOGY | Admitting: OBSTETRICS & GYNECOLOGY
Payer: COMMERCIAL

## 2021-11-12 VITALS
HEIGHT: 66 IN | DIASTOLIC BLOOD PRESSURE: 55 MMHG | TEMPERATURE: 98 F | RESPIRATION RATE: 16 BRPM | WEIGHT: 189 LBS | BODY MASS INDEX: 30.37 KG/M2 | SYSTOLIC BLOOD PRESSURE: 94 MMHG | OXYGEN SATURATION: 96 % | HEART RATE: 88 BPM

## 2021-11-12 LAB
ABO + RH BLD: NORMAL
BASOPHILS # BLD AUTO: 0.04 K/UL (ref 0–0.2)
BASOPHILS NFR BLD: 0.4 % (ref 0–1.9)
BLD GP AB SCN CELLS X3 SERPL QL: NORMAL
DIFFERENTIAL METHOD: ABNORMAL
EOSINOPHIL # BLD AUTO: 0.1 K/UL (ref 0–0.5)
EOSINOPHIL NFR BLD: 0.5 % (ref 0–8)
ERYTHROCYTE [DISTWIDTH] IN BLOOD BY AUTOMATED COUNT: 12.9 % (ref 11.5–14.5)
HCT VFR BLD AUTO: 33.3 % (ref 37–48.5)
HGB BLD-MCNC: 11.4 G/DL (ref 12–16)
IMM GRANULOCYTES # BLD AUTO: 0.32 K/UL (ref 0–0.04)
IMM GRANULOCYTES NFR BLD AUTO: 3.3 % (ref 0–0.5)
LYMPHOCYTES # BLD AUTO: 1.9 K/UL (ref 1–4.8)
LYMPHOCYTES NFR BLD: 19.9 % (ref 18–48)
MCH RBC QN AUTO: 31.6 PG (ref 27–31)
MCHC RBC AUTO-ENTMCNC: 34.2 G/DL (ref 32–36)
MCV RBC AUTO: 92 FL (ref 82–98)
MONOCYTES # BLD AUTO: 0.7 K/UL (ref 0.3–1)
MONOCYTES NFR BLD: 7.1 % (ref 4–15)
NEUTROPHILS # BLD AUTO: 6.6 K/UL (ref 1.8–7.7)
NEUTROPHILS NFR BLD: 68.8 % (ref 38–73)
NRBC BLD-RTO: 0 /100 WBC
PLATELET # BLD AUTO: 269 K/UL (ref 150–450)
PMV BLD AUTO: 10.2 FL (ref 9.2–12.9)
RBC # BLD AUTO: 3.61 M/UL (ref 4–5.4)
WBC # BLD AUTO: 9.61 K/UL (ref 3.9–12.7)

## 2021-11-12 PROCEDURE — 76815 PR  US,PREGNANT UTERUS,LIMITED, 1/> FETUSES: ICD-10-PCS | Mod: 26,,, | Performed by: OBSTETRICS & GYNECOLOGY

## 2021-11-12 PROCEDURE — G0378 HOSPITAL OBSERVATION PER HR: HCPCS

## 2021-11-12 PROCEDURE — 59412 ANTEPARTUM MANIPULATION: CPT

## 2021-11-12 PROCEDURE — 63600175 PHARM REV CODE 636 W HCPCS: Performed by: STUDENT IN AN ORGANIZED HEALTH CARE EDUCATION/TRAINING PROGRAM

## 2021-11-12 PROCEDURE — 96372 THER/PROPH/DIAG INJ SC/IM: CPT | Mod: 59 | Performed by: OBSTETRICS & GYNECOLOGY

## 2021-11-12 PROCEDURE — 85025 COMPLETE CBC W/AUTO DIFF WBC: CPT | Performed by: STUDENT IN AN ORGANIZED HEALTH CARE EDUCATION/TRAINING PROGRAM

## 2021-11-12 PROCEDURE — 59412 PR ANTEPARTUM HEAD MANIPULATION: ICD-10-PCS | Mod: ,,, | Performed by: OBSTETRICS & GYNECOLOGY

## 2021-11-12 PROCEDURE — 86900 BLOOD TYPING SEROLOGIC ABO: CPT | Performed by: STUDENT IN AN ORGANIZED HEALTH CARE EDUCATION/TRAINING PROGRAM

## 2021-11-12 PROCEDURE — 59412 ANTEPARTUM MANIPULATION: CPT | Mod: ,,, | Performed by: OBSTETRICS & GYNECOLOGY

## 2021-11-12 PROCEDURE — 76815 OB US LIMITED FETUS(S): CPT | Mod: 26,,, | Performed by: OBSTETRICS & GYNECOLOGY

## 2021-11-12 RX ORDER — TERBUTALINE SULFATE 1 MG/ML
0.25 INJECTION SUBCUTANEOUS ONCE
Status: COMPLETED | OUTPATIENT
Start: 2021-11-12 | End: 2021-11-12

## 2021-11-12 RX ORDER — PROCHLORPERAZINE EDISYLATE 5 MG/ML
5 INJECTION INTRAMUSCULAR; INTRAVENOUS EVERY 6 HOURS PRN
Status: DISCONTINUED | OUTPATIENT
Start: 2021-11-12 | End: 2021-11-12 | Stop reason: HOSPADM

## 2021-11-12 RX ORDER — SODIUM CHLORIDE 0.9 % (FLUSH) 0.9 %
2 SYRINGE (ML) INJECTION
Status: DISCONTINUED | OUTPATIENT
Start: 2021-11-12 | End: 2021-11-12 | Stop reason: HOSPADM

## 2021-11-12 RX ORDER — ONDANSETRON 8 MG/1
8 TABLET, ORALLY DISINTEGRATING ORAL EVERY 8 HOURS PRN
Status: DISCONTINUED | OUTPATIENT
Start: 2021-11-12 | End: 2021-11-12 | Stop reason: HOSPADM

## 2021-11-12 RX ADMIN — TERBUTALINE SULFATE 0.25 MG: 1 INJECTION, SOLUTION SUBCUTANEOUS at 09:11

## 2021-11-18 ENCOUNTER — ROUTINE PRENATAL (OUTPATIENT)
Dept: OBSTETRICS AND GYNECOLOGY | Facility: CLINIC | Age: 36
End: 2021-11-18
Payer: COMMERCIAL

## 2021-11-18 ENCOUNTER — PATIENT MESSAGE (OUTPATIENT)
Dept: OBSTETRICS AND GYNECOLOGY | Facility: OTHER | Age: 36
End: 2021-11-18
Payer: COMMERCIAL

## 2021-11-18 VITALS
WEIGHT: 192.25 LBS | BODY MASS INDEX: 31.03 KG/M2 | SYSTOLIC BLOOD PRESSURE: 116 MMHG | DIASTOLIC BLOOD PRESSURE: 68 MMHG

## 2021-11-18 DIAGNOSIS — Z3A.39 39 WEEKS GESTATION OF PREGNANCY: Primary | ICD-10-CM

## 2021-11-18 PROCEDURE — 0502F SUBSEQUENT PRENATAL CARE: CPT | Mod: CPTII,S$GLB,, | Performed by: OBSTETRICS & GYNECOLOGY

## 2021-11-18 PROCEDURE — 99999 PR PBB SHADOW E&M-EST. PATIENT-LVL III: ICD-10-PCS | Mod: PBBFAC,,, | Performed by: OBSTETRICS & GYNECOLOGY

## 2021-11-18 PROCEDURE — 0502F PR SUBSEQUENT PRENATAL CARE: ICD-10-PCS | Mod: CPTII,S$GLB,, | Performed by: OBSTETRICS & GYNECOLOGY

## 2021-11-18 PROCEDURE — 99999 PR PBB SHADOW E&M-EST. PATIENT-LVL III: CPT | Mod: PBBFAC,,, | Performed by: OBSTETRICS & GYNECOLOGY

## 2021-11-23 ENCOUNTER — PATIENT MESSAGE (OUTPATIENT)
Dept: OBSTETRICS AND GYNECOLOGY | Facility: OTHER | Age: 36
End: 2021-11-23
Payer: COMMERCIAL

## 2021-11-23 ENCOUNTER — HOSPITAL ENCOUNTER (EMERGENCY)
Facility: OTHER | Age: 36
Discharge: HOME | End: 2021-11-23
Payer: COMMERCIAL

## 2021-11-23 VITALS
SYSTOLIC BLOOD PRESSURE: 132 MMHG | HEART RATE: 119 BPM | OXYGEN SATURATION: 99 % | TEMPERATURE: 99 F | DIASTOLIC BLOOD PRESSURE: 81 MMHG | RESPIRATION RATE: 18 BRPM

## 2021-11-23 DIAGNOSIS — O47.9 UTERINE CONTRACTIONS DURING PREGNANCY: ICD-10-CM

## 2021-11-23 DIAGNOSIS — Z3A.38 38 WEEKS GESTATION OF PREGNANCY: ICD-10-CM

## 2021-11-23 DIAGNOSIS — R11.0 NAUSEA: Primary | ICD-10-CM

## 2021-11-23 LAB
ALBUMIN SERPL BCP-MCNC: 2.8 G/DL (ref 3.5–5.2)
ALP SERPL-CCNC: 148 U/L (ref 55–135)
ALT SERPL W/O P-5'-P-CCNC: 14 U/L (ref 10–44)
ANION GAP SERPL CALC-SCNC: 14 MMOL/L (ref 8–16)
AST SERPL-CCNC: 15 U/L (ref 10–40)
BASOPHILS # BLD AUTO: 0.03 K/UL (ref 0–0.2)
BASOPHILS NFR BLD: 0.3 % (ref 0–1.9)
BILIRUB SERPL-MCNC: 1.5 MG/DL (ref 0.1–1)
BUN SERPL-MCNC: 8 MG/DL (ref 6–20)
CALCIUM SERPL-MCNC: 8.5 MG/DL (ref 8.7–10.5)
CHLORIDE SERPL-SCNC: 105 MMOL/L (ref 95–110)
CO2 SERPL-SCNC: 16 MMOL/L (ref 23–29)
CREAT SERPL-MCNC: 0.6 MG/DL (ref 0.5–1.4)
DIFFERENTIAL METHOD: ABNORMAL
EOSINOPHIL # BLD AUTO: 0 K/UL (ref 0–0.5)
EOSINOPHIL NFR BLD: 0.1 % (ref 0–8)
ERYTHROCYTE [DISTWIDTH] IN BLOOD BY AUTOMATED COUNT: 13.1 % (ref 11.5–14.5)
EST. GFR  (AFRICAN AMERICAN): >60 ML/MIN/1.73 M^2
EST. GFR  (NON AFRICAN AMERICAN): >60 ML/MIN/1.73 M^2
GLUCOSE SERPL-MCNC: 99 MG/DL (ref 70–110)
HCT VFR BLD AUTO: 33.1 % (ref 37–48.5)
HGB BLD-MCNC: 11.5 G/DL (ref 12–16)
IMM GRANULOCYTES # BLD AUTO: 0.2 K/UL (ref 0–0.04)
IMM GRANULOCYTES NFR BLD AUTO: 1.7 % (ref 0–0.5)
INFLUENZA A, MOLECULAR: NEGATIVE
INFLUENZA B, MOLECULAR: NEGATIVE
LYMPHOCYTES # BLD AUTO: 0.6 K/UL (ref 1–4.8)
LYMPHOCYTES NFR BLD: 4.9 % (ref 18–48)
MCH RBC QN AUTO: 31.5 PG (ref 27–31)
MCHC RBC AUTO-ENTMCNC: 34.7 G/DL (ref 32–36)
MCV RBC AUTO: 91 FL (ref 82–98)
MONOCYTES # BLD AUTO: 0.4 K/UL (ref 0.3–1)
MONOCYTES NFR BLD: 3.2 % (ref 4–15)
NEUTROPHILS # BLD AUTO: 10.6 K/UL (ref 1.8–7.7)
NEUTROPHILS NFR BLD: 89.8 % (ref 38–73)
NRBC BLD-RTO: 0 /100 WBC
PLATELET # BLD AUTO: 239 K/UL (ref 150–450)
PMV BLD AUTO: 9.9 FL (ref 9.2–12.9)
POTASSIUM SERPL-SCNC: 3.7 MMOL/L (ref 3.5–5.1)
PROT SERPL-MCNC: 6.4 G/DL (ref 6–8.4)
RBC # BLD AUTO: 3.65 M/UL (ref 4–5.4)
SODIUM SERPL-SCNC: 135 MMOL/L (ref 136–145)
SPECIMEN SOURCE: NORMAL
WBC # BLD AUTO: 11.76 K/UL (ref 3.9–12.7)

## 2021-11-23 PROCEDURE — 96361 HYDRATE IV INFUSION ADD-ON: CPT

## 2021-11-23 PROCEDURE — 85025 COMPLETE CBC W/AUTO DIFF WBC: CPT

## 2021-11-23 PROCEDURE — 80053 COMPREHEN METABOLIC PANEL: CPT

## 2021-11-23 PROCEDURE — 99284 EMERGENCY DEPT VISIT MOD MDM: CPT | Mod: 25,,, | Performed by: OBSTETRICS & GYNECOLOGY

## 2021-11-23 PROCEDURE — 96374 THER/PROPH/DIAG INJ IV PUSH: CPT

## 2021-11-23 PROCEDURE — 63600175 PHARM REV CODE 636 W HCPCS: Performed by: OBSTETRICS & GYNECOLOGY

## 2021-11-23 PROCEDURE — 63600175 PHARM REV CODE 636 W HCPCS

## 2021-11-23 PROCEDURE — 59025 FETAL NON-STRESS TEST: CPT

## 2021-11-23 PROCEDURE — 59025 FETAL NON-STRESS TEST: CPT | Mod: 26,,, | Performed by: OBSTETRICS & GYNECOLOGY

## 2021-11-23 PROCEDURE — 87502 INFLUENZA DNA AMP PROBE: CPT

## 2021-11-23 PROCEDURE — 59025 PR FETAL 2N-STRESS TEST: ICD-10-PCS | Mod: 26,,, | Performed by: OBSTETRICS & GYNECOLOGY

## 2021-11-23 PROCEDURE — 99284 PR EMERGENCY DEPT VISIT,LEVEL IV: ICD-10-PCS | Mod: 25,,, | Performed by: OBSTETRICS & GYNECOLOGY

## 2021-11-23 PROCEDURE — 99284 EMERGENCY DEPT VISIT MOD MDM: CPT | Mod: 25

## 2021-11-23 RX ORDER — ONDANSETRON 2 MG/ML
4 INJECTION INTRAMUSCULAR; INTRAVENOUS ONCE
Status: COMPLETED | OUTPATIENT
Start: 2021-11-23 | End: 2021-11-23

## 2021-11-23 RX ADMIN — DEXTROSE, SODIUM CHLORIDE, SODIUM LACTATE, POTASSIUM CHLORIDE, AND CALCIUM CHLORIDE 1000 ML: 5; .6; .31; .03; .02 INJECTION, SOLUTION INTRAVENOUS at 09:11

## 2021-11-23 RX ADMIN — ONDANSETRON 4 MG: 2 INJECTION INTRAMUSCULAR; INTRAVENOUS at 09:11

## 2021-11-29 ENCOUNTER — ANESTHESIA EVENT (OUTPATIENT)
Dept: OBSTETRICS AND GYNECOLOGY | Facility: OTHER | Age: 36
End: 2021-11-29
Payer: COMMERCIAL

## 2021-11-29 ENCOUNTER — ANESTHESIA (OUTPATIENT)
Dept: OBSTETRICS AND GYNECOLOGY | Facility: OTHER | Age: 36
End: 2021-11-29
Payer: COMMERCIAL

## 2021-11-29 ENCOUNTER — HOSPITAL ENCOUNTER (INPATIENT)
Facility: OTHER | Age: 36
LOS: 2 days | Discharge: HOME | End: 2021-12-01
Attending: OBSTETRICS & GYNECOLOGY | Admitting: OBSTETRICS & GYNECOLOGY
Payer: COMMERCIAL

## 2021-11-29 DIAGNOSIS — Z3A.39 39 WEEKS GESTATION OF PREGNANCY: ICD-10-CM

## 2021-11-29 DIAGNOSIS — Z98.891 S/P CESAREAN SECTION: Primary | ICD-10-CM

## 2021-11-29 LAB
ABO + RH BLD: NORMAL
BASOPHILS # BLD AUTO: 0.02 K/UL (ref 0–0.2)
BASOPHILS NFR BLD: 0.2 % (ref 0–1.9)
BLD GP AB SCN CELLS X3 SERPL QL: NORMAL
DIFFERENTIAL METHOD: ABNORMAL
EOSINOPHIL # BLD AUTO: 0 K/UL (ref 0–0.5)
EOSINOPHIL NFR BLD: 0.5 % (ref 0–8)
ERYTHROCYTE [DISTWIDTH] IN BLOOD BY AUTOMATED COUNT: 12.7 % (ref 11.5–14.5)
HCT VFR BLD AUTO: 33.8 % (ref 37–48.5)
HGB BLD-MCNC: 11.8 G/DL (ref 12–16)
IMM GRANULOCYTES # BLD AUTO: 0.15 K/UL (ref 0–0.04)
IMM GRANULOCYTES NFR BLD AUTO: 1.8 % (ref 0–0.5)
LYMPHOCYTES # BLD AUTO: 2.5 K/UL (ref 1–4.8)
LYMPHOCYTES NFR BLD: 28.9 % (ref 18–48)
MCH RBC QN AUTO: 31.6 PG (ref 27–31)
MCHC RBC AUTO-ENTMCNC: 34.9 G/DL (ref 32–36)
MCV RBC AUTO: 90 FL (ref 82–98)
MONOCYTES # BLD AUTO: 0.6 K/UL (ref 0.3–1)
MONOCYTES NFR BLD: 7.1 % (ref 4–15)
NEUTROPHILS # BLD AUTO: 5.3 K/UL (ref 1.8–7.7)
NEUTROPHILS NFR BLD: 61.5 % (ref 38–73)
NRBC BLD-RTO: 0 /100 WBC
PLATELET # BLD AUTO: 269 K/UL (ref 150–450)
PLATELET BLD QL SMEAR: ABNORMAL
PMV BLD AUTO: 9.8 FL (ref 9.2–12.9)
RBC # BLD AUTO: 3.74 M/UL (ref 4–5.4)
WBC # BLD AUTO: 8.56 K/UL (ref 3.9–12.7)

## 2021-11-29 PROCEDURE — 36004724 HC OB OR TIME LEV III - 1ST 15 MIN: Performed by: OBSTETRICS & GYNECOLOGY

## 2021-11-29 PROCEDURE — 59514 PRA REAN DELIVERY ONLY: ICD-10-PCS | Mod: ,,, | Performed by: ANESTHESIOLOGY

## 2021-11-29 PROCEDURE — 25000003 PHARM REV CODE 250

## 2021-11-29 PROCEDURE — 71000033 HC RECOVERY, INTIAL HOUR: Performed by: OBSTETRICS & GYNECOLOGY

## 2021-11-29 PROCEDURE — 51702 INSERT TEMP BLADDER CATH: CPT

## 2021-11-29 PROCEDURE — 25000003 PHARM REV CODE 250: Performed by: STUDENT IN AN ORGANIZED HEALTH CARE EDUCATION/TRAINING PROGRAM

## 2021-11-29 PROCEDURE — 59510 CESAREAN DELIVERY: CPT | Mod: GB,,, | Performed by: OBSTETRICS & GYNECOLOGY

## 2021-11-29 PROCEDURE — 63600175 PHARM REV CODE 636 W HCPCS

## 2021-11-29 PROCEDURE — 86900 BLOOD TYPING SEROLOGIC ABO: CPT | Performed by: OBSTETRICS & GYNECOLOGY

## 2021-11-29 PROCEDURE — 59510 PR FULL ROUT OBSTE CARE,CESAREAN DELIV: ICD-10-PCS | Mod: GB,,, | Performed by: OBSTETRICS & GYNECOLOGY

## 2021-11-29 PROCEDURE — 36004725 HC OB OR TIME LEV III - EA ADD 15 MIN: Performed by: OBSTETRICS & GYNECOLOGY

## 2021-11-29 PROCEDURE — 85025 COMPLETE CBC W/AUTO DIFF WBC: CPT | Performed by: OBSTETRICS & GYNECOLOGY

## 2021-11-29 PROCEDURE — 63600175 PHARM REV CODE 636 W HCPCS: Performed by: STUDENT IN AN ORGANIZED HEALTH CARE EDUCATION/TRAINING PROGRAM

## 2021-11-29 PROCEDURE — 37000009 HC ANESTHESIA EA ADD 15 MINS: Performed by: OBSTETRICS & GYNECOLOGY

## 2021-11-29 PROCEDURE — 37000008 HC ANESTHESIA 1ST 15 MINUTES: Performed by: OBSTETRICS & GYNECOLOGY

## 2021-11-29 PROCEDURE — 11000001 HC ACUTE MED/SURG PRIVATE ROOM

## 2021-11-29 PROCEDURE — 71000039 HC RECOVERY, EACH ADD'L HOUR: Performed by: OBSTETRICS & GYNECOLOGY

## 2021-11-29 PROCEDURE — 59514 CESAREAN DELIVERY ONLY: CPT | Mod: ,,, | Performed by: ANESTHESIOLOGY

## 2021-11-29 RX ORDER — IBUPROFEN 400 MG/1
800 TABLET ORAL
Status: DISCONTINUED | OUTPATIENT
Start: 2021-11-30 | End: 2021-12-01 | Stop reason: HOSPADM

## 2021-11-29 RX ORDER — OXYTOCIN 10 [USP'U]/ML
INJECTION, SOLUTION INTRAMUSCULAR; INTRAVENOUS
Status: DISCONTINUED | OUTPATIENT
Start: 2021-11-29 | End: 2021-11-29

## 2021-11-29 RX ORDER — HYDROCORTISONE 25 MG/G
CREAM TOPICAL 3 TIMES DAILY PRN
Status: DISCONTINUED | OUTPATIENT
Start: 2021-11-29 | End: 2021-12-01 | Stop reason: HOSPADM

## 2021-11-29 RX ORDER — ONDANSETRON 8 MG/1
8 TABLET, ORALLY DISINTEGRATING ORAL EVERY 8 HOURS PRN
Status: DISCONTINUED | OUTPATIENT
Start: 2021-11-29 | End: 2021-12-01 | Stop reason: HOSPADM

## 2021-11-29 RX ORDER — DEXAMETHASONE SODIUM PHOSPHATE 4 MG/ML
INJECTION, SOLUTION INTRA-ARTICULAR; INTRALESIONAL; INTRAMUSCULAR; INTRAVENOUS; SOFT TISSUE
Status: DISCONTINUED | OUTPATIENT
Start: 2021-11-29 | End: 2021-11-29

## 2021-11-29 RX ORDER — MISOPROSTOL 200 UG/1
800 TABLET ORAL ONCE AS NEEDED
Status: DISCONTINUED | OUTPATIENT
Start: 2021-11-29 | End: 2021-12-01 | Stop reason: HOSPADM

## 2021-11-29 RX ORDER — FAMOTIDINE 10 MG/ML
20 INJECTION INTRAVENOUS
Status: COMPLETED | OUTPATIENT
Start: 2021-11-29 | End: 2021-11-29

## 2021-11-29 RX ORDER — CARBOPROST TROMETHAMINE 250 UG/ML
250 INJECTION, SOLUTION INTRAMUSCULAR
Status: DISCONTINUED | OUTPATIENT
Start: 2021-11-29 | End: 2021-12-01 | Stop reason: HOSPADM

## 2021-11-29 RX ORDER — MORPHINE SULFATE 1 MG/ML
INJECTION, SOLUTION EPIDURAL; INTRATHECAL; INTRAVENOUS
Status: DISCONTINUED | OUTPATIENT
Start: 2021-11-29 | End: 2021-11-29

## 2021-11-29 RX ORDER — ACETAMINOPHEN 325 MG/1
650 TABLET ORAL
Status: DISCONTINUED | OUTPATIENT
Start: 2021-11-29 | End: 2021-12-01 | Stop reason: HOSPADM

## 2021-11-29 RX ORDER — DOCUSATE SODIUM 100 MG/1
200 CAPSULE, LIQUID FILLED ORAL 2 TIMES DAILY
Status: DISCONTINUED | OUTPATIENT
Start: 2021-11-29 | End: 2021-12-01 | Stop reason: HOSPADM

## 2021-11-29 RX ORDER — FENTANYL CITRATE 50 UG/ML
INJECTION, SOLUTION INTRAMUSCULAR; INTRAVENOUS
Status: DISCONTINUED | OUTPATIENT
Start: 2021-11-29 | End: 2021-11-29

## 2021-11-29 RX ORDER — SIMETHICONE 80 MG
1 TABLET,CHEWABLE ORAL EVERY 6 HOURS PRN
Status: DISCONTINUED | OUTPATIENT
Start: 2021-11-29 | End: 2021-12-01 | Stop reason: HOSPADM

## 2021-11-29 RX ORDER — TRANEXAMIC ACID 100 MG/ML
1000 INJECTION, SOLUTION INTRAVENOUS ONCE AS NEEDED
Status: DISCONTINUED | OUTPATIENT
Start: 2021-11-29 | End: 2021-12-01 | Stop reason: HOSPADM

## 2021-11-29 RX ORDER — DIPHENHYDRAMINE HCL 25 MG
25 CAPSULE ORAL EVERY 6 HOURS PRN
Status: DISCONTINUED | OUTPATIENT
Start: 2021-11-29 | End: 2021-12-01 | Stop reason: HOSPADM

## 2021-11-29 RX ORDER — ACETAMINOPHEN 500 MG
1000 TABLET ORAL
Status: COMPLETED | OUTPATIENT
Start: 2021-11-29 | End: 2021-11-29

## 2021-11-29 RX ORDER — PROCHLORPERAZINE EDISYLATE 5 MG/ML
5 INJECTION INTRAMUSCULAR; INTRAVENOUS EVERY 6 HOURS PRN
Status: DISCONTINUED | OUTPATIENT
Start: 2021-11-29 | End: 2021-12-01 | Stop reason: HOSPADM

## 2021-11-29 RX ORDER — ONDANSETRON 2 MG/ML
4 INJECTION INTRAMUSCULAR; INTRAVENOUS EVERY 6 HOURS PRN
Status: DISCONTINUED | OUTPATIENT
Start: 2021-11-29 | End: 2021-12-01 | Stop reason: HOSPADM

## 2021-11-29 RX ORDER — BUPIVACAINE HYDROCHLORIDE 7.5 MG/ML
INJECTION, SOLUTION INTRASPINAL
Status: DISCONTINUED | OUTPATIENT
Start: 2021-11-29 | End: 2021-11-29

## 2021-11-29 RX ORDER — CEFAZOLIN SODIUM 2 G/50ML
2 SOLUTION INTRAVENOUS
Status: DISCONTINUED | OUTPATIENT
Start: 2021-11-29 | End: 2021-12-01 | Stop reason: HOSPADM

## 2021-11-29 RX ORDER — SODIUM CHLORIDE, SODIUM LACTATE, POTASSIUM CHLORIDE, CALCIUM CHLORIDE 600; 310; 30; 20 MG/100ML; MG/100ML; MG/100ML; MG/100ML
INJECTION, SOLUTION INTRAVENOUS CONTINUOUS
Status: DISCONTINUED | OUTPATIENT
Start: 2021-11-29 | End: 2021-12-01 | Stop reason: HOSPADM

## 2021-11-29 RX ORDER — ONDANSETRON HYDROCHLORIDE 2 MG/ML
INJECTION, SOLUTION INTRAMUSCULAR; INTRAVENOUS
Status: DISCONTINUED | OUTPATIENT
Start: 2021-11-29 | End: 2021-11-29

## 2021-11-29 RX ORDER — DIPHENHYDRAMINE HYDROCHLORIDE 50 MG/ML
12.5 INJECTION INTRAMUSCULAR; INTRAVENOUS
Status: DISCONTINUED | OUTPATIENT
Start: 2021-11-29 | End: 2021-12-01 | Stop reason: HOSPADM

## 2021-11-29 RX ORDER — KETOROLAC TROMETHAMINE 30 MG/ML
30 INJECTION, SOLUTION INTRAMUSCULAR; INTRAVENOUS
Status: COMPLETED | OUTPATIENT
Start: 2021-11-29 | End: 2021-11-30

## 2021-11-29 RX ORDER — METHYLERGONOVINE MALEATE 0.2 MG/ML
200 INJECTION INTRAVENOUS ONCE AS NEEDED
Status: DISCONTINUED | OUTPATIENT
Start: 2021-11-29 | End: 2021-12-01 | Stop reason: HOSPADM

## 2021-11-29 RX ORDER — NALBUPHINE HYDROCHLORIDE 10 MG/ML
5 INJECTION, SOLUTION INTRAMUSCULAR; INTRAVENOUS; SUBCUTANEOUS ONCE AS NEEDED
Status: DISCONTINUED | OUTPATIENT
Start: 2021-11-29 | End: 2021-12-01 | Stop reason: HOSPADM

## 2021-11-29 RX ORDER — AMOXICILLIN 250 MG
1 CAPSULE ORAL NIGHTLY PRN
Status: DISCONTINUED | OUTPATIENT
Start: 2021-11-29 | End: 2021-12-01 | Stop reason: HOSPADM

## 2021-11-29 RX ORDER — OXYCODONE HYDROCHLORIDE 5 MG/1
10 TABLET ORAL EVERY 4 HOURS PRN
Status: DISCONTINUED | OUTPATIENT
Start: 2021-11-29 | End: 2021-12-01 | Stop reason: HOSPADM

## 2021-11-29 RX ORDER — OXYCODONE HYDROCHLORIDE 5 MG/1
5 TABLET ORAL EVERY 4 HOURS PRN
Status: DISCONTINUED | OUTPATIENT
Start: 2021-11-29 | End: 2021-12-01 | Stop reason: HOSPADM

## 2021-11-29 RX ORDER — SODIUM CHLORIDE, SODIUM LACTATE, POTASSIUM CHLORIDE, CALCIUM CHLORIDE 600; 310; 30; 20 MG/100ML; MG/100ML; MG/100ML; MG/100ML
INJECTION, SOLUTION INTRAVENOUS CONTINUOUS PRN
Status: DISCONTINUED | OUTPATIENT
Start: 2021-11-29 | End: 2021-11-29

## 2021-11-29 RX ORDER — PRENATAL WITH FERROUS FUM AND FOLIC ACID 3080; 920; 120; 400; 22; 1.84; 3; 20; 10; 1; 12; 200; 27; 25; 2 [IU]/1; [IU]/1; MG/1; [IU]/1; MG/1; MG/1; MG/1; MG/1; MG/1; MG/1; UG/1; MG/1; MG/1; MG/1; MG/1
1 TABLET ORAL DAILY
Status: DISCONTINUED | OUTPATIENT
Start: 2021-11-29 | End: 2021-12-01 | Stop reason: HOSPADM

## 2021-11-29 RX ORDER — PHENYLEPHRINE HCL IN 0.9% NACL 1 MG/10 ML
SYRINGE (ML) INTRAVENOUS
Status: DISCONTINUED | OUTPATIENT
Start: 2021-11-29 | End: 2021-11-29

## 2021-11-29 RX ORDER — SODIUM CITRATE AND CITRIC ACID MONOHYDRATE 334; 500 MG/5ML; MG/5ML
30 SOLUTION ORAL
Status: COMPLETED | OUTPATIENT
Start: 2021-11-29 | End: 2021-11-29

## 2021-11-29 RX ORDER — OXYTOCIN/RINGER'S LACTATE 30/500 ML
95 PLASTIC BAG, INJECTION (ML) INTRAVENOUS CONTINUOUS
Status: DISCONTINUED | OUTPATIENT
Start: 2021-11-29 | End: 2021-12-01 | Stop reason: HOSPADM

## 2021-11-29 RX ADMIN — SODIUM CITRATE AND CITRIC ACID MONOHYDRATE 30 ML: 500; 334 SOLUTION ORAL at 08:11

## 2021-11-29 RX ADMIN — DOCUSATE SODIUM 200 MG: 100 CAPSULE ORAL at 08:11

## 2021-11-29 RX ADMIN — KETOROLAC TROMETHAMINE 30 MG: 30 INJECTION, SOLUTION INTRAMUSCULAR; INTRAVENOUS at 04:11

## 2021-11-29 RX ADMIN — SODIUM CHLORIDE 2 G: 9 INJECTION, SOLUTION INTRAVENOUS at 09:11

## 2021-11-29 RX ADMIN — ACETAMINOPHEN 650 MG: 325 TABLET, FILM COATED ORAL at 09:11

## 2021-11-29 RX ADMIN — KETOROLAC TROMETHAMINE 30 MG: 30 INJECTION, SOLUTION INTRAMUSCULAR; INTRAVENOUS at 10:11

## 2021-11-29 RX ADMIN — Medication 95 MILLI-UNITS/MIN: at 10:11

## 2021-11-29 RX ADMIN — SODIUM CHLORIDE, SODIUM LACTATE, POTASSIUM CHLORIDE, AND CALCIUM CHLORIDE: 600; 310; 30; 20 INJECTION, SOLUTION INTRAVENOUS at 09:11

## 2021-11-29 RX ADMIN — ACETAMINOPHEN 1000 MG: 500 TABLET ORAL at 08:11

## 2021-11-29 RX ADMIN — ONDANSETRON 4 MG: 2 INJECTION INTRAMUSCULAR; INTRAVENOUS at 09:11

## 2021-11-29 RX ADMIN — FENTANYL CITRATE 10 MCG: 50 INJECTION, SOLUTION INTRAMUSCULAR; INTRAVENOUS at 09:11

## 2021-11-29 RX ADMIN — MORPHINE SULFATE 0.1 MG: 1 INJECTION EPIDURAL; INTRATHECAL; INTRAVENOUS at 09:11

## 2021-11-29 RX ADMIN — ACETAMINOPHEN 650 MG: 325 TABLET, FILM COATED ORAL at 03:11

## 2021-11-29 RX ADMIN — DEXAMETHASONE SODIUM PHOSPHATE 4 MG: 4 INJECTION INTRA-ARTICULAR; INTRALESIONAL; INTRAMUSCULAR; INTRAVENOUS; SOFT TISSUE at 09:11

## 2021-11-29 RX ADMIN — OXYTOCIN 6 UNITS: 10 INJECTION, SOLUTION INTRAMUSCULAR; INTRAVENOUS at 09:11

## 2021-11-29 RX ADMIN — Medication 100 MCG: at 09:11

## 2021-11-29 RX ADMIN — PHENYLEPHRINE HYDROCHLORIDE 50 MCG/MIN: 10 INJECTION INTRAVENOUS at 09:11

## 2021-11-29 RX ADMIN — FAMOTIDINE 20 MG: 10 INJECTION INTRAVENOUS at 08:11

## 2021-11-29 RX ADMIN — BUPIVACAINE HYDROCHLORIDE 1.6 ML: 7.5 INJECTION, SOLUTION INTRASPINAL at 09:11

## 2021-11-30 LAB
BASOPHILS # BLD AUTO: 0.03 K/UL (ref 0–0.2)
BASOPHILS NFR BLD: 0.2 % (ref 0–1.9)
DIFFERENTIAL METHOD: ABNORMAL
EOSINOPHIL # BLD AUTO: 0.1 K/UL (ref 0–0.5)
EOSINOPHIL NFR BLD: 0.4 % (ref 0–8)
ERYTHROCYTE [DISTWIDTH] IN BLOOD BY AUTOMATED COUNT: 12.9 % (ref 11.5–14.5)
HCT VFR BLD AUTO: 28.8 % (ref 37–48.5)
HGB BLD-MCNC: 9.7 G/DL (ref 12–16)
IMM GRANULOCYTES # BLD AUTO: 0.18 K/UL (ref 0–0.04)
IMM GRANULOCYTES NFR BLD AUTO: 1.3 % (ref 0–0.5)
LYMPHOCYTES # BLD AUTO: 3.6 K/UL (ref 1–4.8)
LYMPHOCYTES NFR BLD: 25.1 % (ref 18–48)
MCH RBC QN AUTO: 31.1 PG (ref 27–31)
MCHC RBC AUTO-ENTMCNC: 33.7 G/DL (ref 32–36)
MCV RBC AUTO: 92 FL (ref 82–98)
MONOCYTES # BLD AUTO: 1.1 K/UL (ref 0.3–1)
MONOCYTES NFR BLD: 7.5 % (ref 4–15)
NEUTROPHILS # BLD AUTO: 9.3 K/UL (ref 1.8–7.7)
NEUTROPHILS NFR BLD: 65.5 % (ref 38–73)
NRBC BLD-RTO: 0 /100 WBC
PLATELET # BLD AUTO: 218 K/UL (ref 150–450)
PMV BLD AUTO: 9.9 FL (ref 9.2–12.9)
RBC # BLD AUTO: 3.12 M/UL (ref 4–5.4)
WBC # BLD AUTO: 14.21 K/UL (ref 3.9–12.7)

## 2021-11-30 PROCEDURE — 99024 POSTOP FOLLOW-UP VISIT: CPT | Mod: ,,, | Performed by: OBSTETRICS & GYNECOLOGY

## 2021-11-30 PROCEDURE — 99024 PR POST-OP FOLLOW-UP VISIT: ICD-10-PCS | Mod: ,,, | Performed by: OBSTETRICS & GYNECOLOGY

## 2021-11-30 PROCEDURE — 25000003 PHARM REV CODE 250

## 2021-11-30 PROCEDURE — 85025 COMPLETE CBC W/AUTO DIFF WBC: CPT | Performed by: OBSTETRICS & GYNECOLOGY

## 2021-11-30 PROCEDURE — 63600175 PHARM REV CODE 636 W HCPCS

## 2021-11-30 PROCEDURE — 11000001 HC ACUTE MED/SURG PRIVATE ROOM

## 2021-11-30 PROCEDURE — 36415 COLL VENOUS BLD VENIPUNCTURE: CPT | Performed by: OBSTETRICS & GYNECOLOGY

## 2021-11-30 RX ADMIN — PRENATAL VIT W/ FE FUMARATE-FA TAB 27-0.8 MG 1 TABLET: 27-0.8 TAB at 08:11

## 2021-11-30 RX ADMIN — ACETAMINOPHEN 650 MG: 325 TABLET, FILM COATED ORAL at 03:11

## 2021-11-30 RX ADMIN — IBUPROFEN 800 MG: 400 TABLET, FILM COATED ORAL at 08:11

## 2021-11-30 RX ADMIN — IBUPROFEN 800 MG: 400 TABLET, FILM COATED ORAL at 11:11

## 2021-11-30 RX ADMIN — ACETAMINOPHEN 650 MG: 325 TABLET, FILM COATED ORAL at 05:11

## 2021-11-30 RX ADMIN — OXYCODONE 5 MG: 5 TABLET ORAL at 10:11

## 2021-11-30 RX ADMIN — OXYCODONE 5 MG: 5 TABLET ORAL at 05:11

## 2021-11-30 RX ADMIN — DOCUSATE SODIUM 200 MG: 100 CAPSULE ORAL at 08:11

## 2021-11-30 RX ADMIN — KETOROLAC TROMETHAMINE 30 MG: 30 INJECTION, SOLUTION INTRAMUSCULAR; INTRAVENOUS at 05:11

## 2021-11-30 RX ADMIN — ACETAMINOPHEN 650 MG: 325 TABLET, FILM COATED ORAL at 08:11

## 2021-11-30 RX ADMIN — OXYCODONE 5 MG: 5 TABLET ORAL at 12:11

## 2021-12-01 ENCOUNTER — RESEARCH ENCOUNTER (OUTPATIENT)
Dept: RESEARCH | Facility: HOSPITAL | Age: 36
End: 2021-12-01
Payer: COMMERCIAL

## 2021-12-01 VITALS
DIASTOLIC BLOOD PRESSURE: 74 MMHG | BODY MASS INDEX: 30.86 KG/M2 | SYSTOLIC BLOOD PRESSURE: 123 MMHG | RESPIRATION RATE: 18 BRPM | OXYGEN SATURATION: 97 % | WEIGHT: 192 LBS | HEART RATE: 74 BPM | HEIGHT: 66 IN | TEMPERATURE: 98 F

## 2021-12-01 PROBLEM — Z98.891 S/P CESAREAN SECTION: Status: ACTIVE | Noted: 2021-12-01

## 2021-12-01 PROCEDURE — 99024 POSTOP FOLLOW-UP VISIT: CPT | Mod: ,,, | Performed by: OBSTETRICS & GYNECOLOGY

## 2021-12-01 PROCEDURE — 25000003 PHARM REV CODE 250

## 2021-12-01 PROCEDURE — 99024 PR POST-OP FOLLOW-UP VISIT: ICD-10-PCS | Mod: ,,, | Performed by: OBSTETRICS & GYNECOLOGY

## 2021-12-01 RX ORDER — IBUPROFEN 800 MG/1
800 TABLET ORAL EVERY 6 HOURS PRN
Qty: 30 TABLET | Refills: 2 | Status: SHIPPED | OUTPATIENT
Start: 2021-12-01 | End: 2022-04-08

## 2021-12-01 RX ORDER — DOCUSATE SODIUM 100 MG/1
100 CAPSULE, LIQUID FILLED ORAL 2 TIMES DAILY PRN
Qty: 60 CAPSULE | Refills: 0 | Status: SHIPPED | OUTPATIENT
Start: 2021-12-01 | End: 2023-09-20

## 2021-12-01 RX ORDER — OXYCODONE HYDROCHLORIDE 5 MG/1
5 TABLET ORAL EVERY 4 HOURS PRN
Qty: 14 TABLET | Refills: 0 | Status: SHIPPED | OUTPATIENT
Start: 2021-12-01 | End: 2022-04-08

## 2021-12-01 RX ADMIN — DOCUSATE SODIUM 200 MG: 100 CAPSULE ORAL at 09:12

## 2021-12-01 RX ADMIN — PRENATAL VIT W/ FE FUMARATE-FA TAB 27-0.8 MG 1 TABLET: 27-0.8 TAB at 09:12

## 2021-12-01 RX ADMIN — ACETAMINOPHEN 650 MG: 325 TABLET, FILM COATED ORAL at 12:12

## 2021-12-01 RX ADMIN — IBUPROFEN 800 MG: 400 TABLET, FILM COATED ORAL at 12:12

## 2021-12-01 RX ADMIN — ACETAMINOPHEN 650 MG: 325 TABLET, FILM COATED ORAL at 06:12

## 2021-12-01 RX ADMIN — IBUPROFEN 800 MG: 400 TABLET, FILM COATED ORAL at 04:12

## 2021-12-02 ENCOUNTER — NURSE TRIAGE (OUTPATIENT)
Dept: ADMINISTRATIVE | Facility: CLINIC | Age: 36
End: 2021-12-02
Payer: COMMERCIAL

## 2021-12-02 ENCOUNTER — TELEPHONE (OUTPATIENT)
Dept: OBSTETRICS AND GYNECOLOGY | Facility: CLINIC | Age: 36
End: 2021-12-02
Payer: COMMERCIAL

## 2021-12-03 ENCOUNTER — HOSPITAL ENCOUNTER (EMERGENCY)
Facility: OTHER | Age: 36
Discharge: HOME | End: 2021-12-03
Payer: COMMERCIAL

## 2021-12-03 ENCOUNTER — PATIENT MESSAGE (OUTPATIENT)
Dept: OBSTETRICS AND GYNECOLOGY | Facility: CLINIC | Age: 36
End: 2021-12-03
Payer: COMMERCIAL

## 2021-12-03 ENCOUNTER — NURSE TRIAGE (OUTPATIENT)
Dept: ADMINISTRATIVE | Facility: CLINIC | Age: 36
End: 2021-12-03
Payer: COMMERCIAL

## 2021-12-03 VITALS
DIASTOLIC BLOOD PRESSURE: 78 MMHG | HEART RATE: 60 BPM | OXYGEN SATURATION: 99 % | SYSTOLIC BLOOD PRESSURE: 128 MMHG | RESPIRATION RATE: 16 BRPM | TEMPERATURE: 97 F

## 2021-12-03 DIAGNOSIS — R03.0 ELEVATED BLOOD PRESSURE READING: Primary | ICD-10-CM

## 2021-12-03 PROCEDURE — 99282 EMERGENCY DEPT VISIT SF MDM: CPT

## 2021-12-06 ENCOUNTER — PATIENT MESSAGE (OUTPATIENT)
Dept: OBSTETRICS AND GYNECOLOGY | Facility: CLINIC | Age: 36
End: 2021-12-06
Payer: COMMERCIAL

## 2022-01-03 ENCOUNTER — LAB VISIT (OUTPATIENT)
Dept: PRIMARY CARE CLINIC | Facility: OTHER | Age: 37
End: 2022-01-03
Attending: INTERNAL MEDICINE
Payer: COMMERCIAL

## 2022-01-03 DIAGNOSIS — Z20.822 ENCOUNTER FOR LABORATORY TESTING FOR COVID-19 VIRUS: ICD-10-CM

## 2022-01-03 PROCEDURE — U0003 INFECTIOUS AGENT DETECTION BY NUCLEIC ACID (DNA OR RNA); SEVERE ACUTE RESPIRATORY SYNDROME CORONAVIRUS 2 (SARS-COV-2) (CORONAVIRUS DISEASE [COVID-19]), AMPLIFIED PROBE TECHNIQUE, MAKING USE OF HIGH THROUGHPUT TECHNOLOGIES AS DESCRIBED BY CMS-2020-01-R: HCPCS | Mod: ST72 | Performed by: INTERNAL MEDICINE

## 2022-01-08 LAB
SARS-COV-2 RNA RESP QL NAA+PROBE: NORMAL
TEST PERFORMANCE INFO SPEC: NORMAL

## 2022-01-10 ENCOUNTER — TELEPHONE (OUTPATIENT)
Dept: RESEARCH | Facility: HOSPITAL | Age: 37
End: 2022-01-10
Payer: COMMERCIAL

## 2022-01-10 NOTE — TELEPHONE ENCOUNTER
PACT 2019.396 note:  Called patient to remind to bring pain medication diary and oxycodone bottle to pp visit tomorrow. Patient verbalized undertsanding.

## 2022-01-11 ENCOUNTER — POSTPARTUM VISIT (OUTPATIENT)
Dept: OBSTETRICS AND GYNECOLOGY | Facility: CLINIC | Age: 37
End: 2022-01-11
Payer: COMMERCIAL

## 2022-01-11 ENCOUNTER — RESEARCH ENCOUNTER (OUTPATIENT)
Dept: RESEARCH | Facility: HOSPITAL | Age: 37
End: 2022-01-11
Payer: COMMERCIAL

## 2022-01-11 VITALS
WEIGHT: 167.75 LBS | SYSTOLIC BLOOD PRESSURE: 120 MMHG | HEIGHT: 66 IN | BODY MASS INDEX: 26.96 KG/M2 | DIASTOLIC BLOOD PRESSURE: 64 MMHG

## 2022-01-11 PROCEDURE — 0503F POSTPARTUM CARE VISIT: CPT | Mod: CPTII,S$GLB,, | Performed by: OBSTETRICS & GYNECOLOGY

## 2022-01-11 PROCEDURE — 0503F PR POSTPARTUM CARE VISIT: ICD-10-PCS | Mod: CPTII,S$GLB,, | Performed by: OBSTETRICS & GYNECOLOGY

## 2022-01-11 PROCEDURE — 99999 PR PBB SHADOW E&M-EST. PATIENT-LVL III: CPT | Mod: PBBFAC,,, | Performed by: OBSTETRICS & GYNECOLOGY

## 2022-01-11 PROCEDURE — 99999 PR PBB SHADOW E&M-EST. PATIENT-LVL III: ICD-10-PCS | Mod: PBBFAC,,, | Performed by: OBSTETRICS & GYNECOLOGY

## 2022-01-11 NOTE — PROGRESS NOTES
"CC: Post-partum follow-up    Alisa Herrera is a 36 y.o. female  presents for post-partum visit s/p a , due to BREECH, failed version.    Delivery Date: 2021  Delivery MD: Consuelo Glover  Gender: female  Birth Weight: 8 pounds 9 ounces  Breast Feeding: YES  Depression: NO  Contraception: no method    Pregnancy was complicated by: None      /64   Ht 5' 6" (1.676 m)   Wt 76.1 kg (167 lb 12.3 oz)   LMP 2021   Breastfeeding Yes   BMI 27.08 kg/m²     ROS:  GENERAL: No fever, chills, fatigability or weight loss.  VULVAR: No pain, no lesions and no itching.  VAGINAL: No relaxation, no itching, no discharge, no abnormal bleeding and no lesions.  ABDOMEN: No abdominal pain. Denies nausea. Denies vomiting. No diarrhea. No constipation  BREAST: Denies pain. No lumps. No discharge.  URINARY: No incontinence, no nocturia, no frequency and no dysuria.  CARDIOVASCULAR: No chest pain. No shortness of breath. No leg cramps.  NEUROLOGICAL: No headaches. No vision changes.    PHYSICAL EXAM:  PELVIC: deferred  ABDOMEN: soft, NT, ND. Incision well healed. Looks GREAT!    PROCEDURES: None        Diagnosis:  1. Routine postpartum follow-up        Plan:   - may return to normal activities   - Pap smear due in   - recent friend diagnosed with breast cancer. May want MMG 6 months after she is done with breastfeeding. Will email and let us know.          Patient was counseled today on A.C.S. Pap guidelines and recommendations for yearly pelvic exams and monthly self breast exams; to see her PCP for other health maintenance.    FOLLOW UP: in 1 year for routine exam.       "

## 2022-01-11 NOTE — PROGRESS NOTES
PACT (2019.396) Postpartum visit:     Ms. Alisa Smith was approached today for her postpartum visit for PACT study. Patient returned pain medication diary today and pill bottle of Oxycodone 5mg was also returned, with left over pills counted and confirmed. Bottle was return to patient with Deterra bag to dispose of medicine when no longer needed. Patient reported no side effects from Oxycodone, or any ER visits/hospitalizations since discharge. She reported having been tested for COVID on 01/03/2022 due to exposure and that the results were negative.Patient also reported that baby was doing well and had no complications or hospital visits to report. [facility] reloaded with $50 and reminded patient of final study visit (online questionnaire) at 90 days. Patient to complete online survey for this visit when possible.

## 2022-02-04 ENCOUNTER — PATIENT MESSAGE (OUTPATIENT)
Dept: OBSTETRICS AND GYNECOLOGY | Facility: CLINIC | Age: 37
End: 2022-02-04
Payer: COMMERCIAL

## 2022-03-06 ENCOUNTER — PATIENT MESSAGE (OUTPATIENT)
Dept: OBSTETRICS AND GYNECOLOGY | Facility: CLINIC | Age: 37
End: 2022-03-06
Payer: COMMERCIAL

## 2022-03-25 NOTE — ED PROVIDER NOTES
Encounter Date: 10/31/2019       History     Chief Complaint   Patient presents with    Contractions     Alisa Herrera is a 34 y.o. [id number] at 39w5d presenting with contractions for past 24 hours. Increased in frequency and intensity 2-3 hours ago. Denies LOF. Does report vaginal spotting. Normal fetal movement. This IUP is complicated by UC (on mesalamine.)        Review of patient's allergies indicates:   Allergen Reactions    Cimzia [certolizumab pegol]      Lupus like reactions     Past Medical History:   Diagnosis Date    Crohn disease     Ulcerative colitis, unspecified      Past Surgical History:   Procedure Laterality Date    APPENDECTOMY      DILATION AND CURETTAGE OF UTERUS USING SUCTION N/A 2018    Procedure: DILATION AND CURETTAGE, UTERUS, USING SUCTION;  Surgeon: Consuelo Glover MD;  Location: [facility];  Service: OB/GYN;  Laterality: N/A;    EAR TUBE REMOVAL Bilateral     FOOT SURGERY Right     WISDOM TOOTH EXTRACTION       Family History   Problem Relation Age of Onset    Colon cancer Paternal Uncle     Breast cancer Neg Hx     Ovarian cancer Neg Hx      Social History     Tobacco Use    Smoking status: Never Smoker    Smokeless tobacco: Never Used   Substance Use Topics    Alcohol use: Not Currently     Comment: socially    Drug use: No     Review of Systems   Constitutional: Negative for chills and fever.   HENT: Negative for congestion.    Eyes: Negative for visual disturbance.   Respiratory: Negative for shortness of breath.    Cardiovascular: Negative for chest pain.   Gastrointestinal: Positive for abdominal pain. Negative for diarrhea, nausea and vomiting.   Genitourinary: Negative for dysuria, hematuria, vaginal bleeding and vaginal discharge.   Musculoskeletal: Positive for back pain.   Skin: Negative for rash.   Neurological: Negative for dizziness and headaches.       Physical Exam     Initial Vitals   BP Pulse Resp Temp SpO2   10/31/19 0022 10/31/19  0021 10/31/19 0020 10/31/19 0020 --   127/85 82 18 98 °F (36.7 °C)       MAP       --                Physical Exam    Vitals reviewed.  Constitutional: She appears well-developed and well-nourished. She is not diaphoretic. She appears distressed (appears to be in pain).   HENT:   Head: Normocephalic and atraumatic.   Eyes: EOM are normal.   Cardiovascular: Normal rate.   Pulmonary/Chest: No respiratory distress.   Abdominal: Soft. She exhibits no distension. There is no tenderness. There is no rebound and no guarding.   Genitourinary: Uterus normal.   Musculoskeletal: Normal range of motion. She exhibits no edema or tenderness.   Neurological: She is alert and oriented to person, place, and time.   Skin: Skin is warm and dry.   Psychiatric: She has a normal mood and affect. Her behavior is normal. Judgment and thought content normal.     OB LABOR EXAM:   Pre-Term Labor: No.   Membranes ruptured: No.   Method: Sterile vaginal exam per MD.   Vaginal Bleeding: bloody show.     Dilatation: 3.   Station: -3.   Effacement: 90%.   Amniotic Fluid Color: no fluid.   Amniotic Fluid Amount: none noted.         ED Course   Obtain Fetal nonstress test (NST)  Date/Time: 10/31/2019 12:47 AM  Performed by: Ronny Ashton MD  Authorized by: Ronny Ashton MD     Nonstress Test:     Variability:  6-25 BPM    Decelerations:  None    Accelerations:  15 bpm    Baseline:  140    Uterine Irritability: No      Contractions:  Regular    Contraction Frequency:  Q 2-3 min  Biophysical Profile:     Nonstress Test Interpretation: reactive      Overall Impression:  Reassuring  Post-procedure:     Patient tolerance:  Patient tolerated the procedure well with no immediate complications      Labs Reviewed - No data to display       Imaging Results    None          Medical Decision Making:   ED Management:  0045: 3/90/-3  0145: 4/90/-2  Admit to L&D  Other:   I have discussed this case with another health care provider.       <> Summary of the  Discussion: Staffed with Dr. Blessing Hines              Attending Attestation:   Physician Attestation Statement for Resident:  As the supervising MD   Physician Attestation Statement: I have personally seen and examined this patient.   I agree with the above history. -:   As the supervising MD I agree with the above PE.    As the supervising MD I agree with the above treatment, course, plan, and disposition.   -: Patient evaluated and found to be stable, agree with resident's assessment and plan.  I was personally present during the critical portions of the procedure(s) performed by the resident and was immediately available in the ED to provide services and assistance as needed during the entire procedure.  I have reviewed the following: old records at this facility.                       Clinical Impression:       ICD-10-CM ICD-9-CM   1. Normal labor O80 650    Z37.9    2. 39 weeks pregnant      Disposition:   Disposition: Admitted  Condition: Stable       Ronny Ashton MD  PGY3, OBGYN [facility]                   Ronny Ashton MD  Resident  10/31/19 0205       Blessing Hines MD  10/31/19 0640     No

## 2022-04-08 ENCOUNTER — LAB VISIT (OUTPATIENT)
Dept: LAB | Facility: HOSPITAL | Age: 37
End: 2022-04-08
Attending: INTERNAL MEDICINE
Payer: COMMERCIAL

## 2022-04-08 ENCOUNTER — OFFICE VISIT (OUTPATIENT)
Dept: FAMILY MEDICINE | Facility: CLINIC | Age: 37
End: 2022-04-08
Payer: COMMERCIAL

## 2022-04-08 VITALS
HEART RATE: 59 BPM | WEIGHT: 165.38 LBS | SYSTOLIC BLOOD PRESSURE: 122 MMHG | DIASTOLIC BLOOD PRESSURE: 78 MMHG | OXYGEN SATURATION: 99 % | TEMPERATURE: 98 F | HEIGHT: 66 IN | RESPIRATION RATE: 16 BRPM | BODY MASS INDEX: 26.58 KG/M2

## 2022-04-08 DIAGNOSIS — J06.9 UPPER RESPIRATORY TRACT INFECTION, UNSPECIFIED TYPE: ICD-10-CM

## 2022-04-08 DIAGNOSIS — Z76.89 ESTABLISHING CARE WITH NEW DOCTOR, ENCOUNTER FOR: ICD-10-CM

## 2022-04-08 DIAGNOSIS — Z00.00 ANNUAL PHYSICAL EXAM: ICD-10-CM

## 2022-04-08 DIAGNOSIS — Z00.00 ANNUAL PHYSICAL EXAM: Primary | ICD-10-CM

## 2022-04-08 DIAGNOSIS — Z86.16 HISTORY OF COVID-19: ICD-10-CM

## 2022-04-08 LAB
ALBUMIN SERPL BCP-MCNC: 3.9 G/DL (ref 3.5–5.2)
ALP SERPL-CCNC: 65 U/L (ref 55–135)
ALT SERPL W/O P-5'-P-CCNC: 22 U/L (ref 10–44)
ANION GAP SERPL CALC-SCNC: 9 MMOL/L (ref 8–16)
AST SERPL-CCNC: 25 U/L (ref 10–40)
BASOPHILS # BLD AUTO: 0.04 K/UL (ref 0–0.2)
BASOPHILS NFR BLD: 0.6 % (ref 0–1.9)
BILIRUB SERPL-MCNC: 0.5 MG/DL (ref 0.1–1)
BUN SERPL-MCNC: 17 MG/DL (ref 6–20)
CALCIUM SERPL-MCNC: 9.7 MG/DL (ref 8.7–10.5)
CHLORIDE SERPL-SCNC: 104 MMOL/L (ref 95–110)
CHOLEST SERPL-MCNC: 159 MG/DL (ref 120–199)
CHOLEST/HDLC SERPL: 2.9 {RATIO} (ref 2–5)
CO2 SERPL-SCNC: 26 MMOL/L (ref 23–29)
CREAT SERPL-MCNC: 0.8 MG/DL (ref 0.5–1.4)
DIFFERENTIAL METHOD: NORMAL
EOSINOPHIL # BLD AUTO: 0.1 K/UL (ref 0–0.5)
EOSINOPHIL NFR BLD: 0.8 % (ref 0–8)
ERYTHROCYTE [DISTWIDTH] IN BLOOD BY AUTOMATED COUNT: 12.7 % (ref 11.5–14.5)
EST. GFR  (AFRICAN AMERICAN): >60 ML/MIN/1.73 M^2
EST. GFR  (NON AFRICAN AMERICAN): >60 ML/MIN/1.73 M^2
ESTIMATED AVG GLUCOSE: 88 MG/DL (ref 68–131)
GLUCOSE SERPL-MCNC: 77 MG/DL (ref 70–110)
HBA1C MFR BLD: 4.7 % (ref 4–5.6)
HCT VFR BLD AUTO: 38 % (ref 37–48.5)
HDLC SERPL-MCNC: 55 MG/DL (ref 40–75)
HDLC SERPL: 34.6 % (ref 20–50)
HGB BLD-MCNC: 12.4 G/DL (ref 12–16)
IMM GRANULOCYTES # BLD AUTO: 0.03 K/UL (ref 0–0.04)
IMM GRANULOCYTES NFR BLD AUTO: 0.4 % (ref 0–0.5)
LDLC SERPL CALC-MCNC: 93.4 MG/DL (ref 63–159)
LYMPHOCYTES # BLD AUTO: 3.1 K/UL (ref 1–4.8)
LYMPHOCYTES NFR BLD: 43 % (ref 18–48)
MCH RBC QN AUTO: 30.6 PG (ref 27–31)
MCHC RBC AUTO-ENTMCNC: 32.6 G/DL (ref 32–36)
MCV RBC AUTO: 94 FL (ref 82–98)
MONOCYTES # BLD AUTO: 0.6 K/UL (ref 0.3–1)
MONOCYTES NFR BLD: 7.7 % (ref 4–15)
NEUTROPHILS # BLD AUTO: 3.4 K/UL (ref 1.8–7.7)
NEUTROPHILS NFR BLD: 47.5 % (ref 38–73)
NONHDLC SERPL-MCNC: 104 MG/DL
NRBC BLD-RTO: 0 /100 WBC
PLATELET # BLD AUTO: 266 K/UL (ref 150–450)
PMV BLD AUTO: 10.1 FL (ref 9.2–12.9)
POTASSIUM SERPL-SCNC: 4.3 MMOL/L (ref 3.5–5.1)
PROT SERPL-MCNC: 7.4 G/DL (ref 6–8.4)
RBC # BLD AUTO: 4.05 M/UL (ref 4–5.4)
SODIUM SERPL-SCNC: 139 MMOL/L (ref 136–145)
TRIGL SERPL-MCNC: 53 MG/DL (ref 30–150)
TSH SERPL DL<=0.005 MIU/L-ACNC: 2.9 UIU/ML (ref 0.4–4)
WBC # BLD AUTO: 7.18 K/UL (ref 3.9–12.7)

## 2022-04-08 PROCEDURE — 1159F MED LIST DOCD IN RCRD: CPT | Mod: CPTII,S$GLB,, | Performed by: INTERNAL MEDICINE

## 2022-04-08 PROCEDURE — 80053 COMPREHEN METABOLIC PANEL: CPT | Performed by: INTERNAL MEDICINE

## 2022-04-08 PROCEDURE — 99999 PR PBB SHADOW E&M-EST. PATIENT-LVL IV: CPT | Mod: PBBFAC,,, | Performed by: INTERNAL MEDICINE

## 2022-04-08 PROCEDURE — 1160F PR REVIEW ALL MEDS BY PRESCRIBER/CLIN PHARMACIST DOCUMENTED: ICD-10-PCS | Mod: CPTII,S$GLB,, | Performed by: INTERNAL MEDICINE

## 2022-04-08 PROCEDURE — 80061 LIPID PANEL: CPT | Performed by: INTERNAL MEDICINE

## 2022-04-08 PROCEDURE — 99999 PR PBB SHADOW E&M-EST. PATIENT-LVL IV: ICD-10-PCS | Mod: PBBFAC,,, | Performed by: INTERNAL MEDICINE

## 2022-04-08 PROCEDURE — 3008F BODY MASS INDEX DOCD: CPT | Mod: CPTII,S$GLB,, | Performed by: INTERNAL MEDICINE

## 2022-04-08 PROCEDURE — 3078F DIAST BP <80 MM HG: CPT | Mod: CPTII,S$GLB,, | Performed by: INTERNAL MEDICINE

## 2022-04-08 PROCEDURE — 99385 PREV VISIT NEW AGE 18-39: CPT | Mod: S$GLB,,, | Performed by: INTERNAL MEDICINE

## 2022-04-08 PROCEDURE — 85025 COMPLETE CBC W/AUTO DIFF WBC: CPT | Performed by: INTERNAL MEDICINE

## 2022-04-08 PROCEDURE — 1159F PR MEDICATION LIST DOCUMENTED IN MEDICAL RECORD: ICD-10-PCS | Mod: CPTII,S$GLB,, | Performed by: INTERNAL MEDICINE

## 2022-04-08 PROCEDURE — 3078F PR MOST RECENT DIASTOLIC BLOOD PRESSURE < 80 MM HG: ICD-10-PCS | Mod: CPTII,S$GLB,, | Performed by: INTERNAL MEDICINE

## 2022-04-08 PROCEDURE — 99385 PR PREVENTIVE VISIT,NEW,18-39: ICD-10-PCS | Mod: S$GLB,,, | Performed by: INTERNAL MEDICINE

## 2022-04-08 PROCEDURE — 3008F PR BODY MASS INDEX (BMI) DOCUMENTED: ICD-10-PCS | Mod: CPTII,S$GLB,, | Performed by: INTERNAL MEDICINE

## 2022-04-08 PROCEDURE — 3074F PR MOST RECENT SYSTOLIC BLOOD PRESSURE < 130 MM HG: ICD-10-PCS | Mod: CPTII,S$GLB,, | Performed by: INTERNAL MEDICINE

## 2022-04-08 PROCEDURE — 36415 COLL VENOUS BLD VENIPUNCTURE: CPT | Mod: PO | Performed by: INTERNAL MEDICINE

## 2022-04-08 PROCEDURE — 84443 ASSAY THYROID STIM HORMONE: CPT | Performed by: INTERNAL MEDICINE

## 2022-04-08 PROCEDURE — 83036 HEMOGLOBIN GLYCOSYLATED A1C: CPT | Performed by: INTERNAL MEDICINE

## 2022-04-08 PROCEDURE — 1160F RVW MEDS BY RX/DR IN RCRD: CPT | Mod: CPTII,S$GLB,, | Performed by: INTERNAL MEDICINE

## 2022-04-08 PROCEDURE — 3074F SYST BP LT 130 MM HG: CPT | Mod: CPTII,S$GLB,, | Performed by: INTERNAL MEDICINE

## 2022-04-08 RX ORDER — FLUTICASONE PROPIONATE 50 MCG
1 SPRAY, SUSPENSION (ML) NASAL DAILY
Qty: 16 G | Refills: 0 | Status: SHIPPED | OUTPATIENT
Start: 2022-04-08 | End: 2022-05-09

## 2022-04-08 RX ORDER — PREDNISONE 20 MG/1
TABLET ORAL
Qty: 10 TABLET | Refills: 0 | Status: SHIPPED | OUTPATIENT
Start: 2022-04-08 | End: 2023-09-20

## 2022-04-08 NOTE — PROGRESS NOTES
Health Maintenance Due   Topic Date Due    Hepatitis C Screening  Never done    Lipid Panel  Never done    COVID-19 Vaccine (4 - Booster for Pfizer series) 02/03/2022

## 2022-04-08 NOTE — PROGRESS NOTES
Subjective:       Patient ID: Alisa Herrera is a pleasant 36 y.o. White female patient    Chief Complaint: Establish Care, Cough (Started getting worse on ), and Nasal Congestion      Patient is a new pt to me and to our practice.    HPI     She comes today to establish care with a new PCP, to do an annual physical,  as well as due to URI.  She has two daughters, age 2.5 y old and 4 months old, works remote. She is breastfeeding.  She has a H/O colitis, with f-up in BR. She had lupus like reaction to Cimzia, for now is on mesalamine with good response.   She reports that she had Covid in February, mild form, her toddler had it too, possibly the baby. Her  had it in December. She is fully vaccinated + booster.  From this time, she has had fluctuating cough that may come in bouts, not each day, as well as post nasal drip.   She tried Sudafed and took a few ibuprofen.  She does not know if it is still the f-up of Covid or new virus brought by her kids that go to the Day Care.  She denies SOB, she has occasional expectorations that are whitish, sometimes yellowish, were somewhat greenish in February. She has no runny nose but postnasal drip, alongside some mild sore throat that is not new.  No fever. No wheezing.    Patient Active Problem List   Diagnosis    Colitis    Missed     Status post dilation and curettage    S/P  section          ACTIVE MEDICAL ISSUES:  Documented in Problem List     PAST MEDICAL HISTORY  Documented     PAST SURGICAL HISTORY:  Documented     SOCIAL HISTORY:  Documented     FAMILY HISTORY:  Documented     ALLERGIES AND MEDICATIONS: updated and reviewed.  Documented    Review of Systems   Constitutional: Negative for activity change, appetite change, fatigue and fever.   HENT: Positive for postnasal drip and sore throat (mild). Negative for congestion, ear pain, rhinorrhea and sinus pressure.    Eyes: Negative for pain, discharge and redness.    Respiratory: Positive for cough. Negative for chest tightness and shortness of breath.    Cardiovascular: Negative for chest pain, palpitations and leg swelling.   Gastrointestinal: Negative for abdominal pain, constipation and nausea.   Endocrine: Negative for cold intolerance and heat intolerance.   Genitourinary: Negative for difficulty urinating, flank pain, frequency, menstrual problem, pelvic pain, urgency and vaginal discharge.   Musculoskeletal: Negative for arthralgias, back pain, joint swelling and neck pain.   Skin: Negative for color change and rash.   Allergic/Immunologic: Negative for environmental allergies and food allergies.   Neurological: Negative for weakness and numbness.   Hematological: Negative for adenopathy.   Psychiatric/Behavioral: Negative for behavioral problems, hallucinations, sleep disturbance and suicidal ideas. The patient is not nervous/anxious.        Objective:      Physical Exam  Vitals and nursing note reviewed.   Constitutional:       Appearance: She is well-developed.   HENT:      Right Ear: Tympanic membrane and external ear normal.      Left Ear: Tympanic membrane and external ear normal.      Mouth/Throat:      Pharynx: Posterior oropharyngeal erythema (very mild, also mild postnasal drip) present.   Eyes:      Conjunctiva/sclera: Conjunctivae normal.   Neck:      Thyroid: No thyromegaly.   Cardiovascular:      Rate and Rhythm: Normal rate and regular rhythm.      Pulses: Normal pulses.      Heart sounds: Normal heart sounds.   Pulmonary:      Effort: Pulmonary effort is normal. No respiratory distress.      Breath sounds: Normal breath sounds. No wheezing.      Comments: Occasional cough that is wet  Abdominal:      General: Bowel sounds are normal.      Palpations: Abdomen is soft. There is no mass.      Tenderness: There is no abdominal tenderness.   Musculoskeletal:         General: Normal range of motion.      Cervical back: Normal range of motion and neck supple.  "  Lymphadenopathy:      Cervical: No cervical adenopathy.   Skin:     General: Skin is warm and dry.   Neurological:      Mental Status: She is alert and oriented to person, place, and time. Mental status is at baseline.   Psychiatric:         Mood and Affect: Mood normal.         Behavior: Behavior normal.         Thought Content: Thought content normal.         Judgment: Judgment normal.         Vitals:    04/08/22 1426   BP: 122/78   BP Location: Right arm   Patient Position: Sitting   BP Method: Small (Manual)   Pulse: (!) 59   Resp: 16   Temp: 98 °F (36.7 °C)   TempSrc: Oral   SpO2: 99%   Weight: 75 kg (165 lb 5.5 oz)   Height: 5' 6" (1.676 m)     Body mass index is 26.69 kg/m².    RESULTS: will review present blood work.    Assessment:       1. Annual physical exam    2. Establishing care with new doctor, encounter for    3. Upper respiratory tract infection, unspecified type    4. History of COVID-19        Plan:   Alisa was seen today for establish care, cough and nasal congestion.    Diagnoses and all orders for this visit:    Annual physical exam  -     CBC Auto Differential; Future  -     Comprehensive Metabolic Panel; Future  -     Hemoglobin A1C; Future  -     TSH; Future  -     Lipid Panel; Future    Will do usual blood work, discussed preventative measures. Pt with excellent lifestyle.    Establishing care with new doctor, encounter for    Discussed the importance of a good pt-doctor trust relationship. Provided pt with my contact.    Upper respiratory tract infection, unspecified type  -     predniSONE (DELTASONE) 20 MG tablet; Take 2 pills in the morning for 5 days, on a full stomach  -     fluticasone propionate (FLONASE) 50 mcg/actuation nasal spray; 1 spray (50 mcg total) by Each Nostril route once daily.    H/O COVID in 02/2022. No indication for antibiotics, will try a 5 days course of steroids. Steroids are not C-I with breastfeeding, nor have known interaction with mesalamine. Discussed the " use of saline nasal spray and Flonase, advised to take anti-histamine. Discussed the symptoms and signs to monitor and that may prompt her to seek for medical attention.  She can also contact me any time if question or concern.    History of COVID-19    See HPI. Had full vaccination + booster.    No follow-ups on file.    This note was created by combination of typed  and M-Modal dictation.  Transcription errors may be present.  If there are any questions, please contact me.

## 2022-05-02 ENCOUNTER — PATIENT MESSAGE (OUTPATIENT)
Dept: OBSTETRICS AND GYNECOLOGY | Facility: CLINIC | Age: 37
End: 2022-05-02
Payer: COMMERCIAL

## 2022-05-08 DIAGNOSIS — J06.9 UPPER RESPIRATORY TRACT INFECTION, UNSPECIFIED TYPE: ICD-10-CM

## 2022-05-08 NOTE — TELEPHONE ENCOUNTER
No new care gaps identified.  [facility] Embedded Care Gaps. Reference number: [id number]. 5/08/2022   12:20:39 AM CDT

## 2022-05-09 RX ORDER — FLUTICASONE PROPIONATE 50 MCG
SPRAY, SUSPENSION (ML) NASAL
Qty: 16 ML | Refills: 1 | Status: SHIPPED | OUTPATIENT
Start: 2022-05-09 | End: 2023-02-27

## 2022-05-09 NOTE — TELEPHONE ENCOUNTER
Refill Routing Note   Medication(s) are not appropriate for processing by [facility] for the following reason(s):      - Medication is a new start (<3 months)    [facility] action(s):  Defer          Medication reconciliation completed: No     Appointments  past 12m or future 3m with PCP    Date Provider   Last Visit   4/8/2022 Faye Rizzo MD   Next Visit   Visit date not found Faye Rizzo MD   ED visits in past 90 days: 0        Note composed:11:08 AM 05/09/2022

## 2022-05-15 ENCOUNTER — PATIENT MESSAGE (OUTPATIENT)
Dept: OBSTETRICS AND GYNECOLOGY | Facility: CLINIC | Age: 37
End: 2022-05-15
Payer: COMMERCIAL

## 2022-05-16 RX ORDER — DICLOXACILLIN SODIUM 500 MG/1
500 CAPSULE ORAL 2 TIMES DAILY
Qty: 20 CAPSULE | Refills: 0 | Status: SHIPPED | OUTPATIENT
Start: 2022-05-16 | End: 2022-05-26

## 2022-09-05 ENCOUNTER — PATIENT MESSAGE (OUTPATIENT)
Dept: OBSTETRICS AND GYNECOLOGY | Facility: CLINIC | Age: 37
End: 2022-09-05
Payer: COMMERCIAL

## 2022-09-05 DIAGNOSIS — N61.0 MASTITIS: Primary | ICD-10-CM

## 2022-09-06 RX ORDER — DICLOXACILLIN SODIUM 500 MG/1
500 CAPSULE ORAL 4 TIMES DAILY
Qty: 40 CAPSULE | Refills: 0 | Status: SHIPPED | OUTPATIENT
Start: 2022-09-06 | End: 2022-09-16

## 2022-09-06 RX ORDER — DICLOXACILLIN SODIUM 500 MG/1
500 CAPSULE ORAL 2 TIMES DAILY
Qty: 20 CAPSULE | Refills: 0 | Status: SHIPPED | OUTPATIENT
Start: 2022-09-06 | End: 2022-09-06

## 2022-11-17 NOTE — PROGRESS NOTES
Patient ID: Alisa Herrera is a pleasant 37 y.o. White female.    Chief Complaint: flu like symptoms      Patient is a pt I saw last on 04/08/2022, see my last notes and the list of problems below.    This visit is a Telemedicine Video Visit due to the COVID-19 epidemics.  The patient location is: [place]  The chief complaint leading to consultation is: flu like symptoms    Visit type: audiovisual    Face to Face time with patient: 10 min  20 minutes of total time spent on the encounter, which includes face to face time and non-face to face time preparing to see the patient (eg, review of tests), Obtaining and/or reviewing separately obtained history, Documenting clinical information in the electronic or other health record, Independently interpreting results (not separately reported) and communicating results to the patient/family/caregiver, or Care coordination (not separately reported).         Each patient to whom he or she provides medical services by telemedicine is:  (1) informed of the relationship between the physician and patient and the respective role of any other health care provider with respect to management of the patient; and (2) notified that he or she may decline to receive medical services by telemedicine and may withdraw from such care at any time.    Cough  This is a new problem. The problem has been waxing and waning. The problem occurs hourly. The cough is Productive of sputum and productive of purulent sputum. Associated symptoms include nasal congestion, postnasal drip, rhinorrhea and shortness of breath. Pertinent negatives include no chest pain, chills, ear congestion, ear pain, fever, headaches, heartburn, hemoptysis, myalgias, rash, sore throat, sweats, weight loss or wheezing. Nothing aggravates the symptoms. She has tried nothing for the symptoms. The treatment provided no relief. There is no history of asthma, bronchiectasis, bronchitis, COPD, emphysema,  environmental allergies or pneumonia.      Reports having congestion and cough for the last month.    It started with  flu-like symptoms, she had no fever, but just cough and postnasal drip.   No wheezing, no loss of taste or smell.  She took a COVID test that was negative when symptoms started.   She states that she had the flu shot at  the [facility] as well as the new COVID booster?  She denies any fever, chest pain, no headache, she recently received amoxicillin for dental work.  She states that she is able to work out,  however she may feel more short winded sometimes with the need to cough with clear expectorations. She does not cough at night but rather in the AM.   She took Sudafed, she takes antihistamine (Zyrtec), she has Flonase at the pharmacy but did not take it.  Of note she is still breastfeeding.  She got COVID in August.      Patient Active Problem List   Diagnosis    Colitis    Missed     Status post dilation and curettage    S/P  section    Crohn's disease of both small and large intestine without complications        Review of Systems   Constitutional:  Negative for chills, fever and weight loss.   HENT:  Positive for postnasal drip and rhinorrhea. Negative for ear pain and sore throat.    Respiratory:  Positive for cough and shortness of breath. Negative for hemoptysis and wheezing.    Cardiovascular:  Negative for chest pain.   Gastrointestinal:  Negative for heartburn.   Musculoskeletal:  Negative for myalgias.   Skin:  Negative for rash.   Allergic/Immunologic: Negative for environmental allergies.   Neurological:  Negative for headaches.     Objective:      Physical Exam    There were no vitals filed for this visit.  There is no height or weight on file to calculate BMI.    Pleasant, smiling, NAD, speaks in full sentences.    RESULTS: Reviewed labs from last 12 months    Last Lab Results:     Lab Results   Component Value Date    WBC 7.18 2022    HGB 12.4 2022     HCT 38.0 04/08/2022     04/08/2022     04/08/2022    K 4.3 04/08/2022     04/08/2022    CO2 26 04/08/2022    BUN 17 04/08/2022    CREATININE 0.8 04/08/2022    CALCIUM 9.7 04/08/2022    ALBUMIN 3.9 04/08/2022    AST 25 04/08/2022    ALT 22 04/08/2022    CHOL 159 04/08/2022    TRIG 53 04/08/2022    HDL 55 04/08/2022    LDLCALC 93.4 04/08/2022    HGBA1C 4.7 04/08/2022    TSH 2.902 04/08/2022       Assessment:       1. Acute cough    2. Postnasal drip    3. Crohn's disease of both small and large intestine without complications        Plan:   Alisa was seen today for flu like symptoms.    Diagnoses and all orders for this visit:    Acute cough    See HPI. Seems to be post viral URI. Not bothering her so much except in the morning or if working out, she feels better after coughing, with clear expectorations. Probably related to below. No wheezing.  Will go on with antihistamine (may switch to another one), advised to take guaifenesin in the morning with plenty of fluid. She needs to contact me if symptoms or signs of concern.    Postnasal drip    See above.    Crohn's disease of both small and large intestine without complications    Seems to be doing well at this regard. F-up GI, on Asacol.    No follow-ups on file.    This note was created by combination of typed  and M-Modal dictation.  Transcription errors may be present.  If there are any questions, please contact me.

## 2022-11-18 ENCOUNTER — OFFICE VISIT (OUTPATIENT)
Dept: FAMILY MEDICINE | Facility: CLINIC | Age: 37
End: 2022-11-18
Payer: COMMERCIAL

## 2022-11-18 DIAGNOSIS — R09.82 POSTNASAL DRIP: ICD-10-CM

## 2022-11-18 DIAGNOSIS — R05.1 ACUTE COUGH: Primary | ICD-10-CM

## 2022-11-18 DIAGNOSIS — K50.80 CROHN'S DISEASE OF BOTH SMALL AND LARGE INTESTINE WITHOUT COMPLICATIONS: ICD-10-CM

## 2022-11-18 PROCEDURE — 1160F PR REVIEW ALL MEDS BY PRESCRIBER/CLIN PHARMACIST DOCUMENTED: ICD-10-PCS | Mod: CPTII,95,, | Performed by: INTERNAL MEDICINE

## 2022-11-18 PROCEDURE — 3044F HG A1C LEVEL LT 7.0%: CPT | Mod: CPTII,95,, | Performed by: INTERNAL MEDICINE

## 2022-11-18 PROCEDURE — 99214 PR OFFICE/OUTPT VISIT, EST, LEVL IV, 30-39 MIN: ICD-10-PCS | Mod: 95,,, | Performed by: INTERNAL MEDICINE

## 2022-11-18 PROCEDURE — 1159F PR MEDICATION LIST DOCUMENTED IN MEDICAL RECORD: ICD-10-PCS | Mod: CPTII,95,, | Performed by: INTERNAL MEDICINE

## 2022-11-18 PROCEDURE — 1159F MED LIST DOCD IN RCRD: CPT | Mod: CPTII,95,, | Performed by: INTERNAL MEDICINE

## 2022-11-18 PROCEDURE — 1160F RVW MEDS BY RX/DR IN RCRD: CPT | Mod: CPTII,95,, | Performed by: INTERNAL MEDICINE

## 2022-11-18 PROCEDURE — 99214 OFFICE O/P EST MOD 30 MIN: CPT | Mod: 95,,, | Performed by: INTERNAL MEDICINE

## 2022-11-18 PROCEDURE — 3044F PR MOST RECENT HEMOGLOBIN A1C LEVEL <7.0%: ICD-10-PCS | Mod: CPTII,95,, | Performed by: INTERNAL MEDICINE

## 2022-12-12 ENCOUNTER — PATIENT MESSAGE (OUTPATIENT)
Dept: OBSTETRICS AND GYNECOLOGY | Facility: CLINIC | Age: 37
End: 2022-12-12
Payer: COMMERCIAL

## 2023-01-27 ENCOUNTER — TELEPHONE (OUTPATIENT)
Dept: OBSTETRICS AND GYNECOLOGY | Facility: CLINIC | Age: 38
End: 2023-01-27
Payer: COMMERCIAL

## 2023-01-27 ENCOUNTER — NURSE TRIAGE (OUTPATIENT)
Dept: ADMINISTRATIVE | Facility: CLINIC | Age: 38
End: 2023-01-27
Payer: COMMERCIAL

## 2023-01-27 DIAGNOSIS — R10.2 PELVIC PAIN: Primary | ICD-10-CM

## 2023-01-27 NOTE — TELEPHONE ENCOUNTER
Mrs. Herrera (her OBGYN Dr. Glover is unavailable at present).  She is post partum by 14 months, recently stopped breastfeeding and has had 3 menstrual cycles, but states with each cycle, her abdominal pain is worsening.  According to the calendar, she should be ovulating currently, started with lower abdominal pain/pelvic floor pain approximately 48 hours ago, it is constant in nature, but fluctuating in severity, states at times it is so severe that it takes her breath away.  Our protocol states that she should be seen in clinic now, with MD Bueno, or that she should be see in the ED.  SC message sent to Dr. Rizzo, she responded:  Hello, unfortunately no. My schedule is packed.  She definitively has to be seen by Dr. Glover soon, but if she has severe abdominal pain, I don't see any other options than for her to go to the ED. Sorry for this and thanks  I advised Mrs. Herrera on the above, she verbalized understanding.  Will send a high priority message to Dr. Glover's office/staff with request for them to contact the patient ASAP.    Reason for Disposition   Constant abdominal pain lasting > 2 hours    Additional Information   Negative: Passed out (i.e., fainted, collapsed and was not responding)   Negative: Shock suspected (e.g., cold/pale/clammy skin, too weak to stand, low BP, rapid pulse)   Negative: Sounds like a life-threatening emergency to the triager   Negative: SEVERE abdominal pain (e.g., excruciating)   Negative: Vomiting red blood or black (coffee ground) material   Negative: Bloody, black, or tarry bowel movements  (Exception: Chronic-unchanged black-grey bowel movements and is taking iron pills or Pepto-Bismol.)    Protocols used: Abdominal Pain - Female-A-[place]

## 2023-01-31 ENCOUNTER — HOSPITAL ENCOUNTER (OUTPATIENT)
Dept: RADIOLOGY | Facility: HOSPITAL | Age: 38
Discharge: HOME | End: 2023-01-31
Attending: OBSTETRICS & GYNECOLOGY
Payer: COMMERCIAL

## 2023-01-31 DIAGNOSIS — R10.2 PELVIC PAIN: ICD-10-CM

## 2023-01-31 PROCEDURE — 76856 US EXAM PELVIC COMPLETE: CPT | Mod: TC

## 2023-01-31 PROCEDURE — 76856 US PELVIS COMP WITH TRANSVAG NON-OB (XPD): ICD-10-PCS | Mod: 26,,, | Performed by: STUDENT IN AN ORGANIZED HEALTH CARE EDUCATION/TRAINING PROGRAM

## 2023-01-31 PROCEDURE — 76856 US EXAM PELVIC COMPLETE: CPT | Mod: 26,,, | Performed by: STUDENT IN AN ORGANIZED HEALTH CARE EDUCATION/TRAINING PROGRAM

## 2023-01-31 PROCEDURE — 76830 TRANSVAGINAL US NON-OB: CPT | Mod: 26,,, | Performed by: STUDENT IN AN ORGANIZED HEALTH CARE EDUCATION/TRAINING PROGRAM

## 2023-01-31 PROCEDURE — 76830 US PELVIS COMP WITH TRANSVAG NON-OB (XPD): ICD-10-PCS | Mod: 26,,, | Performed by: STUDENT IN AN ORGANIZED HEALTH CARE EDUCATION/TRAINING PROGRAM

## 2023-03-02 ENCOUNTER — PATIENT MESSAGE (OUTPATIENT)
Dept: FAMILY MEDICINE | Facility: CLINIC | Age: 38
End: 2023-03-02
Payer: COMMERCIAL

## 2023-03-07 ENCOUNTER — TELEPHONE (OUTPATIENT)
Dept: FAMILY MEDICINE | Facility: CLINIC | Age: 38
End: 2023-03-07
Payer: COMMERCIAL

## 2023-03-07 DIAGNOSIS — R79.89 ELEVATED TSH: Primary | ICD-10-CM

## 2023-03-15 ENCOUNTER — LAB VISIT (OUTPATIENT)
Dept: LAB | Facility: HOSPITAL | Age: 38
End: 2023-03-15
Attending: INTERNAL MEDICINE
Payer: COMMERCIAL

## 2023-03-15 DIAGNOSIS — R79.89 ELEVATED TSH: ICD-10-CM

## 2023-03-15 LAB
T3 SERPL-MCNC: 73 NG/DL (ref 60–180)
T4 FREE SERPL-MCNC: 0.84 NG/DL (ref 0.71–1.51)
TSH SERPL DL<=0.005 MIU/L-ACNC: 2.38 UIU/ML (ref 0.4–4)

## 2023-03-15 PROCEDURE — 36415 COLL VENOUS BLD VENIPUNCTURE: CPT | Mod: PN | Performed by: INTERNAL MEDICINE

## 2023-03-15 PROCEDURE — 84480 ASSAY TRIIODOTHYRONINE (T3): CPT | Performed by: INTERNAL MEDICINE

## 2023-03-15 PROCEDURE — 84443 ASSAY THYROID STIM HORMONE: CPT | Performed by: INTERNAL MEDICINE

## 2023-03-15 PROCEDURE — 84439 ASSAY OF FREE THYROXINE: CPT | Performed by: INTERNAL MEDICINE

## 2023-03-20 ENCOUNTER — OFFICE VISIT (OUTPATIENT)
Dept: FAMILY MEDICINE | Facility: CLINIC | Age: 38
End: 2023-03-20
Payer: COMMERCIAL

## 2023-03-20 VITALS
SYSTOLIC BLOOD PRESSURE: 120 MMHG | BODY MASS INDEX: 27.6 KG/M2 | DIASTOLIC BLOOD PRESSURE: 72 MMHG | OXYGEN SATURATION: 97 % | RESPIRATION RATE: 16 BRPM | TEMPERATURE: 98 F | WEIGHT: 171.75 LBS | HEART RATE: 75 BPM | HEIGHT: 66 IN

## 2023-03-20 DIAGNOSIS — K50.80 CROHN'S DISEASE OF BOTH SMALL AND LARGE INTESTINE WITHOUT COMPLICATIONS: ICD-10-CM

## 2023-03-20 DIAGNOSIS — Z00.00 ENCOUNTER FOR ANNUAL PHYSICAL EXAM: Primary | ICD-10-CM

## 2023-03-20 DIAGNOSIS — R79.89 ELEVATED TSH: ICD-10-CM

## 2023-03-20 PROBLEM — O02.1 MISSED ABORTION: Status: RESOLVED | Noted: 2018-12-18 | Resolved: 2023-03-20

## 2023-03-20 PROBLEM — Z98.890 STATUS POST DILATION AND CURETTAGE: Status: RESOLVED | Noted: 2018-12-18 | Resolved: 2023-03-20

## 2023-03-20 PROBLEM — Z98.891 S/P CESAREAN SECTION: Status: RESOLVED | Noted: 2021-12-01 | Resolved: 2023-03-20

## 2023-03-20 PROCEDURE — 1159F MED LIST DOCD IN RCRD: CPT | Mod: CPTII,S$GLB,, | Performed by: INTERNAL MEDICINE

## 2023-03-20 PROCEDURE — 3008F BODY MASS INDEX DOCD: CPT | Mod: CPTII,S$GLB,, | Performed by: INTERNAL MEDICINE

## 2023-03-20 PROCEDURE — 1160F RVW MEDS BY RX/DR IN RCRD: CPT | Mod: CPTII,S$GLB,, | Performed by: INTERNAL MEDICINE

## 2023-03-20 PROCEDURE — 3078F PR MOST RECENT DIASTOLIC BLOOD PRESSURE < 80 MM HG: ICD-10-PCS | Mod: CPTII,S$GLB,, | Performed by: INTERNAL MEDICINE

## 2023-03-20 PROCEDURE — 99395 PR PREVENTIVE VISIT,EST,18-39: ICD-10-PCS | Mod: S$GLB,,, | Performed by: INTERNAL MEDICINE

## 2023-03-20 PROCEDURE — 3008F PR BODY MASS INDEX (BMI) DOCUMENTED: ICD-10-PCS | Mod: CPTII,S$GLB,, | Performed by: INTERNAL MEDICINE

## 2023-03-20 PROCEDURE — 3074F SYST BP LT 130 MM HG: CPT | Mod: CPTII,S$GLB,, | Performed by: INTERNAL MEDICINE

## 2023-03-20 PROCEDURE — 3078F DIAST BP <80 MM HG: CPT | Mod: CPTII,S$GLB,, | Performed by: INTERNAL MEDICINE

## 2023-03-20 PROCEDURE — 1160F PR REVIEW ALL MEDS BY PRESCRIBER/CLIN PHARMACIST DOCUMENTED: ICD-10-PCS | Mod: CPTII,S$GLB,, | Performed by: INTERNAL MEDICINE

## 2023-03-20 PROCEDURE — 3074F PR MOST RECENT SYSTOLIC BLOOD PRESSURE < 130 MM HG: ICD-10-PCS | Mod: CPTII,S$GLB,, | Performed by: INTERNAL MEDICINE

## 2023-03-20 PROCEDURE — 99999 PR PBB SHADOW E&M-EST. PATIENT-LVL IV: CPT | Mod: PBBFAC,,, | Performed by: INTERNAL MEDICINE

## 2023-03-20 PROCEDURE — 99999 PR PBB SHADOW E&M-EST. PATIENT-LVL IV: ICD-10-PCS | Mod: PBBFAC,,, | Performed by: INTERNAL MEDICINE

## 2023-03-20 PROCEDURE — 1159F PR MEDICATION LIST DOCUMENTED IN MEDICAL RECORD: ICD-10-PCS | Mod: CPTII,S$GLB,, | Performed by: INTERNAL MEDICINE

## 2023-03-20 PROCEDURE — 99395 PREV VISIT EST AGE 18-39: CPT | Mod: S$GLB,,, | Performed by: INTERNAL MEDICINE

## 2023-03-20 RX ORDER — KETOCONAZOLE 20 MG/ML
SHAMPOO, SUSPENSION TOPICAL
COMMUNITY
Start: 2022-11-17 | End: 2023-09-20

## 2023-03-20 RX ORDER — CYCLOBENZAPRINE HCL 10 MG
10 TABLET ORAL NIGHTLY
COMMUNITY
Start: 2022-09-23

## 2023-03-20 RX ORDER — CLOBETASOL PROPIONATE 0.46 MG/ML
SOLUTION TOPICAL 2 TIMES DAILY
COMMUNITY
Start: 2022-11-17 | End: 2023-09-20

## 2023-03-20 RX ORDER — NICOTINE POLACRILEX 2 MG
GUM BUCCAL
COMMUNITY
Start: 2022-06-27

## 2023-03-20 RX ORDER — L.ACID,GASS/B.ANIM,BIFID,INFAN 50B CELL
CAPSULE,DELAYED RELEASE (ENTERIC COATED) ORAL
COMMUNITY
Start: 2020-04-26

## 2023-03-20 NOTE — PROGRESS NOTES
Subjective:       Patient ID: Alisa Herrera is a pleasant 37 y.o. White female patient    Chief Complaint: Annual Exam      Patient is a pt I saw last on 11/18/2022, see my last notes and the list of problems below.    HPI     Pt with PMH as per list of problems below coming today for an annual physical exam.  Also wanted to recheck thyroid function as when she was seen in November 2021 by the GI specialist, TSH was slightly above goal.  She reports feeling globally fine, they have a 3 and 2 year-old baby girls that are doing well.    She reports that she has no issue regarding Crohn's disease.    On the 31 st of January 2023, she had a pelvic ultrasound done due to abdominal pain with no major finding.  No more pain from then.    Patient Active Problem List   Diagnosis    Colitis    Crohn's disease of both small and large intestine without complications          ACTIVE MEDICAL ISSUES:  Documented in Problem List     PAST MEDICAL HISTORY  Documented     PAST SURGICAL HISTORY:  Documented     SOCIAL HISTORY:  Documented     FAMILY HISTORY:  Documented     ALLERGIES AND MEDICATIONS: updated and reviewed.  Documented    Review of Systems   Constitutional:  Negative for activity change and unexpected weight change.   HENT:  Negative for hearing loss and trouble swallowing.    Eyes:  Negative for discharge and visual disturbance.   Respiratory:  Negative for chest tightness and wheezing.    Cardiovascular:  Negative for chest pain and palpitations.   Gastrointestinal:  Negative for blood in stool, constipation, diarrhea and vomiting.   Endocrine: Negative for polydipsia and polyuria.   Genitourinary:  Negative for difficulty urinating, dysuria, hematuria and menstrual problem.   Musculoskeletal:  Negative for arthralgias, joint swelling and neck pain.   Neurological:  Negative for weakness and headaches.   Psychiatric/Behavioral:  Negative for confusion and dysphoric mood.      Objective:      Physical  "Exam    Vitals:    03/20/23 1503   BP: 120/72   BP Location: Right arm   Patient Position: Sitting   BP Method: Large (Manual)   Pulse: 75   Resp: 16   Temp: 98.2 °F (36.8 °C)   TempSrc: Oral   SpO2: 97%   Weight: 77.9 kg (171 lb 11.8 oz)   Height: 5' 6" (1.676 m)     Body mass index is 27.72 kg/m².    RESULTS: Reviewed labs from last 12 months    Last Lab Results:     Lab Results   Component Value Date    WBC 7.18 04/08/2022    HGB 12.4 04/08/2022    HCT 38.0 04/08/2022     04/08/2022     04/08/2022    K 4.3 04/08/2022     04/08/2022    CO2 26 04/08/2022    BUN 17 04/08/2022    CREATININE 0.8 04/08/2022    CALCIUM 9.7 04/08/2022    ALBUMIN 3.9 04/08/2022    AST 25 04/08/2022    ALT 22 04/08/2022    CHOL 159 04/08/2022    TRIG 53 04/08/2022    HDL 55 04/08/2022    LDLCALC 93.4 04/08/2022    HGBA1C 4.7 04/08/2022    TSH 2.383 03/15/2023     11/2021: TSH 5.39.    US PELVIS COMP WITH TRANSVAG NON-OB (XPD) 01/31/2023:     CLINICAL HISTORY:  Pelvic and perineal pain     TECHNIQUE:  Transabdominal sonography of the pelvis was performed, followed by transvaginal sonography to better evaluate the uterus and ovaries.     COMPARISON:  Ultrasound 07/23/2018     FINDINGS:  Uterus:     Size: 8.3 x 3.9 x 5.9 cm     Masses: None     Endometrium: Normal thickness in this pre menopausal patient, measuring 9 mm.  Two punctate 3 mm cystic foci adjacent to the endometrium, similar to prior exam.     Right ovary:     Size: 2.2 x 2.2 x 1.4 cm     Appearance: Normal     Vascular flow: Normal.     Left ovary:     Size: 3.7 x 2.1 x 3.1 cm     Appearance: Normal.  Dominant follicle noted.     Vascular Flow: Normal.     Free Fluid:     None.     Impression:     No acute sonographic abnormality.     Two punctate cystic foci adjacent to the endometrium, similar prior exam.  Finding is nonspecific however of doubtful clinical significance.    Assessment:       1. Encounter for annual physical exam    2. Crohn's disease of both " small and large intestine without complications    3. Elevated TSH        Plan:   Alisa was seen today for annual exam.    Diagnoses and all orders for this visit:    Encounter for annual physical exam    Reviewed blood work with her. Discussed preventative measures, discussed lifestyle.    Crohn's disease of both small and large intestine without complications    F-up GI, no issue presently.    Elevated TSH    Only once. Repeated TSH dosage with T4 and T3, all in the range.    No follow-ups on file.    This note was created by combination of typed  and M-Modal dictation.  Transcription errors may be present.  If there are any questions, please contact me.

## 2023-03-23 ENCOUNTER — PATIENT MESSAGE (OUTPATIENT)
Dept: FAMILY MEDICINE | Facility: CLINIC | Age: 38
End: 2023-03-23
Payer: COMMERCIAL

## 2023-04-26 ENCOUNTER — PATIENT MESSAGE (OUTPATIENT)
Dept: FAMILY MEDICINE | Facility: CLINIC | Age: 38
End: 2023-04-26
Payer: COMMERCIAL

## 2023-07-01 NOTE — PROGRESS NOTES
Cardiology Daily Progress Note      Patient Name: Jese Ye  MRN: [id number]      Subjective:  Patient is seen sitting in the chair.  He appears dyspneic.  He denies complaints of chest pain, palpitations, lightheadedness, or dizziness.  He expresses concern for taking diuretic prior to possible cardioversion today.    Objective:  Vital signs in last 24 hours:  Temp:  [97.4 °F (36.3 °C)-97.7 °F (36.5 °C)] 97.4 °F (36.3 °C)  Heart Rate:  [] 118  Resp:  [20-23] 20  BP: (105-144)/(54-80) 144/80    Intake/Output:    Intake/Output Summary (Last 24 hours) at 2021 1043  Last data filed at 2021 0930  Gross per 24 hour   Intake 972 ml   Output 2200 ml   Net -1228 ml        Physical Exam:  Physical Exam  Vitals and nursing note reviewed.   Constitutional:       Appearance: He is obese. He is ill-appearing.   HENT:      Head: Normocephalic and atraumatic.      Right Ear: External ear normal.      Left Ear: External ear normal.   Eyes:      Conjunctiva/sclera: Conjunctivae normal.   Cardiovascular:      Rate and Rhythm: Normal rate and regular rhythm.      Pulses:           Radial pulses are 2+ on the right side and 2+ on the left side.      Heart sounds: Normal heart sounds, S1 normal and S2 normal.   Pulmonary:      Effort: Pulmonary effort is normal. Tachypnea present.      Breath sounds: Wheezing present.   Abdominal:      General: Bowel sounds are normal.      Palpations: Abdomen is soft.   Musculoskeletal:         General: Normal range of motion.      Cervical back: Normal range of motion and neck supple.      Right lower le+ Pitting Edema present.      Left lower le+ Pitting Edema present.   Skin:     General: Skin is warm and dry.      Capillary Refill: Capillary refill takes less than 2 seconds.   Neurological:      General: No focal deficit present.      Mental Status: He is alert and oriented to person, place, and time.   Psychiatric:         Mood and Affect: Mood normal.          Health Maintenance Due   Topic     Hepatitis C Screening           Behavior: Behavior normal. Behavior is cooperative.          Medications:  Current Facility-Administered Medications   Medication Dose Route Frequency Provider Last Rate Last Admin   • furosemide (LASIX INJECT) injection 40 mg  40 mg Intravenous Once Benoit Vargas MD       • AMIODarone (CORDARONE) 450 mg/250 mL dextrose 5% infusion  0.5 mg/min Intravenous Continuous Benoit Vargas MD 16.7 mL/hr at 05/03/21 1942 0.5 mg/min at 05/03/21 1942   • methylPREDNISolone (SOLU-Medrol) PF injection 40 mg  40 mg Intravenous 2 times per day Silverio Hector MD   40 mg at 05/04/21 0902   • dilTIAZem (CARDIZEM) 125 mg/125 mL in sodium chloride 0.9 % infusion Solution  0-20 mg/hr Intravenous Continuous Benoit Vargas MD 15 mL/hr at 05/04/21 0625 15 mg/hr at 05/04/21 0625   • pantoprazole (PROTONIX) EC tablet 40 mg  40 mg Oral QAM AC Silverio Hector MD   40 mg at 05/04/21 0642   • insulin glargine (LANTUS) injection 40 Units  40 Units Subcutaneous BID Silverio Hector MD   40 Units at 05/03/21 2107   • insulin lispro (ADMELOG, HumaLOG) injection 18 Units  18 Units Subcutaneous TID AC Silverio Hector MD   18 Units at 05/03/21 1145   • digoxin (LANOXIN) tablet 125 mcg  125 mcg Oral Daily CARI Sebastian   125 mcg at 05/04/21 0901   • roflumilast (DALIRESP) tablet 250 mcg  250 mcg Oral Daily NILS Mcdonnell   250 mcg at 05/04/21 0901   • dilTIAZem (CARDIZEM) bolus from bag 24.75 mg  0.25 mg/kg (Dosing Weight) Intravenous Once Benoit Vargas MD       • ipratropium-albuterol (DUONEB) 0.5-2.5 (3) MG/3ML nebulizer solution 3 mL  3 mL Nebulization Q4H Resp Silverio Hector MD   3 mL at 05/04/21 0710   • diphenhydrAMINE (BENADRYL) capsule 25 mg  25 mg Oral Nightly PRN Silverio Hector MD   25 mg at 05/03/21 2322   • polyethylene glycol (MIRALAX) packet 17 g  17 g Oral Daily Silverio Hector MD   17 g at 05/03/21 0813   • furosemide (LASIX INJECT) injection 40 mg  40 mg Intravenous BID Teddy Walter MD   40 mg at 05/04/21 0912   •  acetaminophen (TYLENOL) tablet 650 mg  650 mg Oral Q4H PRN Silverio Hector MD   650 mg at 05/04/21 0900   • acetaminophen-codeine (TYLENOL NO.3) 300-30 MG per tablet 1 tablet  1 tablet Oral Q4H PRN Silverio Hector MD   1 tablet at 04/25/21 2043   • dextrose 50 % injection 25 g  25 g Intravenous PRN Silverio Hector MD       • dextrose 50 % injection 12.5 g  12.5 g Intravenous PRN Silverio Hector MD       • glucagon (GLUCAGEN) injection 1 mg  1 mg Intramuscular PRN Silverio Hector MD       • dextrose (GLUTOSE) 40 % gel 15 g  15 g Oral PRN Silverio Hector MD       • dextrose (GLUTOSE) 40 % gel 30 g  30 g Oral PRN Silverio Hector MD       • albuterol inhaler 2 puff  2 puff Inhalation Q4H Resp PRN Silverio Hector MD       • allopurinol (ZYLOPRIM) tablet 200 mg  200 mg Oral Q Evening Silverio Hector MD   200 mg at 05/03/21 1725   • apixaBAN (ELIQUIS) tablet 5 mg  5 mg Oral BID Silverio Hector MD   5 mg at 05/04/21 0900   • aspirin (ECOTRIN) enteric coated tablet 81 mg  81 mg Oral Daily Silverio Hector MD   81 mg at 05/04/21 0901   • atorvastatin (LIPITOR) tablet 20 mg  20 mg Oral Daily Silverio Hector MD   20 mg at 05/04/21 0901   • budesonide-formoterol (SYMBICORT) 160-4.5 MCG/ACT inhaler 2 puff  2 puff Inhalation BID Resp Silverio Hector MD   2 puff at 05/04/21 0722   • ferrous sulfate (65 mg Fe per 325 mg) tablet 325 mg  325 mg Oral Daily Silverio Hector MD   325 mg at 05/04/21 0901   • guaiFENesin (MUCINEX) ER tablet 600 mg  600 mg Oral BID Silverio Hector MD   600 mg at 05/04/21 0901   • hydrALAZINE (APRESOLINE) tablet 50 mg  50 mg Oral 3 times per day Silverio Hector MD   50 mg at 05/04/21 0642   • isosorbide mononitrate (IMDUR) ER tablet 30 mg  30 mg Oral Daily Silverio Hector MD   30 mg at 05/04/21 0901   • lisinopril (ZESTRIL) tablet 40 mg  40 mg Oral Daily Silverio Hector MD   40 mg at 05/04/21 0900   • magnesium oxide (MAG-OX) tablet 400 mg  400 mg Oral Daily Silverio Hector MD   400 mg at 05/04/21 0901   • doxazosin (CARDURA) tablet 4 mg  4 mg  Oral Nightly Silverio Hector MD   4 mg at 05/03/21 2106   • [Held by provider] umeclidinium bromide (INCRUSE ELLIPTA) 62.5 MCG/INH inhaler 1 puff  1 puff Inhalation Daily Resp Silverio Hector MD   Stopped at 04/30/21 0900        Laboratory:  Recent Results (from the past 24 hour(s))   GLUCOSE, BEDSIDE - POINT OF CARE    Collection Time: 05/03/21 11:06 AM   Result Value Ref Range    GLUCOSE, BEDSIDE - POINT OF CARE 186 (H) 70 - 99 mg/dL   GLUCOSE, BEDSIDE - POINT OF CARE    Collection Time: 05/03/21  4:37 PM   Result Value Ref Range    GLUCOSE, BEDSIDE - POINT OF CARE 118 (H) 70 - 99 mg/dL   GLUCOSE, BEDSIDE - POINT OF CARE    Collection Time: 05/03/21  8:53 PM   Result Value Ref Range    GLUCOSE, BEDSIDE - POINT OF CARE 239 (H) 70 - 99 mg/dL   GLUCOSE, BEDSIDE - POINT OF CARE    Collection Time: 05/04/21  6:45 AM   Result Value Ref Range    GLUCOSE, BEDSIDE - POINT OF CARE 154 (H) 70 - 99 mg/dL   NT proBNP    Collection Time: 05/04/21  8:17 AM   Result Value Ref Range    NT-proBNP 1,048 (H) <=450 pg/mL       Lab Results   Component Value Date     (H) 11/03/2020     (H) 11/03/2020       Radiology:  LAST ECHO (11/2/2020):  1. Left ventricle: The cavity size is normal. There is concentric hypertrophy. The ejection fraction was measured by visual estimation.  Hypokinesis of the basalinferior and inferoseptal myocardium. The ejection fraction is 50%.   2. Aortic valve: Mild regurgitation.   3. Mitral valve: Mild to moderate regurgitation.   4. Left atrium: The atrium is dilated.   5. Right ventricle: The estimated peak pressure is 60mm Hg. Mod PHTN.   6. Tricuspid valve: Moderate regurgitation.     Assessment and Plan:  78-year-old male presented with complaints of worsening dyspnea.  1.  Dyspnea, multifactorial, patient with more dyspnea today.  2.  Paroxysmal atrial fibrillation, remains in atrial fibrillation, ventricular rate remains elevated, with diltiazem and amiodarone drips and oral digoxin. Management  as per electrophysiologist, Dr. Vargas.  Plan for cardioversion today.  3.  Chronic diastolic heart failure, continue therapy with lisinopril and furosemide.  4.  Coronary artery disease with previous CABG and PCI, stable no anginal symptoms.  5.  Peripheral vascular disease with previous PTCA, stable.  6.  Hypertension, adequately well controlled.  7.  Dyslipidemia, patient is on statin therapy with atorvastatin.  8.  Continue current medication.        Alta Kat, CNP  5/4/2021 10:43 AM                 Statement Selected

## 2023-07-13 ENCOUNTER — OFFICE VISIT (OUTPATIENT)
Dept: OBSTETRICS AND GYNECOLOGY | Facility: CLINIC | Age: 38
End: 2023-07-13
Payer: COMMERCIAL

## 2023-07-13 VITALS
SYSTOLIC BLOOD PRESSURE: 122 MMHG | WEIGHT: 168.63 LBS | BODY MASS INDEX: 27.1 KG/M2 | HEIGHT: 66 IN | DIASTOLIC BLOOD PRESSURE: 72 MMHG

## 2023-07-13 DIAGNOSIS — Z12.31 ENCOUNTER FOR SCREENING MAMMOGRAM FOR MALIGNANT NEOPLASM OF BREAST: ICD-10-CM

## 2023-07-13 DIAGNOSIS — Z80.3 FAMILY HISTORY OF BREAST CANCER: ICD-10-CM

## 2023-07-13 DIAGNOSIS — Z12.4 PAP SMEAR FOR CERVICAL CANCER SCREENING: ICD-10-CM

## 2023-07-13 DIAGNOSIS — Z01.419 ENCOUNTER FOR GYNECOLOGICAL EXAMINATION: Primary | ICD-10-CM

## 2023-07-13 DIAGNOSIS — Z11.51 ENCOUNTER FOR SCREENING FOR HUMAN PAPILLOMAVIRUS (HPV): ICD-10-CM

## 2023-07-13 PROCEDURE — 1159F PR MEDICATION LIST DOCUMENTED IN MEDICAL RECORD: ICD-10-PCS | Mod: CPTII,S$GLB,, | Performed by: OBSTETRICS & GYNECOLOGY

## 2023-07-13 PROCEDURE — 99999 PR PBB SHADOW E&M-EST. PATIENT-LVL III: ICD-10-PCS | Mod: PBBFAC,,, | Performed by: OBSTETRICS & GYNECOLOGY

## 2023-07-13 PROCEDURE — 3074F SYST BP LT 130 MM HG: CPT | Mod: CPTII,S$GLB,, | Performed by: OBSTETRICS & GYNECOLOGY

## 2023-07-13 PROCEDURE — 3074F PR MOST RECENT SYSTOLIC BLOOD PRESSURE < 130 MM HG: ICD-10-PCS | Mod: CPTII,S$GLB,, | Performed by: OBSTETRICS & GYNECOLOGY

## 2023-07-13 PROCEDURE — 99395 PREV VISIT EST AGE 18-39: CPT | Mod: S$GLB,,, | Performed by: OBSTETRICS & GYNECOLOGY

## 2023-07-13 PROCEDURE — 1159F MED LIST DOCD IN RCRD: CPT | Mod: CPTII,S$GLB,, | Performed by: OBSTETRICS & GYNECOLOGY

## 2023-07-13 PROCEDURE — 87624 HPV HI-RISK TYP POOLED RSLT: CPT | Performed by: OBSTETRICS & GYNECOLOGY

## 2023-07-13 PROCEDURE — 88175 CYTOPATH C/V AUTO FLUID REDO: CPT | Performed by: PATHOLOGY

## 2023-07-13 PROCEDURE — 3008F PR BODY MASS INDEX (BMI) DOCUMENTED: ICD-10-PCS | Mod: CPTII,S$GLB,, | Performed by: OBSTETRICS & GYNECOLOGY

## 2023-07-13 PROCEDURE — 99395 PR PREVENTIVE VISIT,EST,18-39: ICD-10-PCS | Mod: S$GLB,,, | Performed by: OBSTETRICS & GYNECOLOGY

## 2023-07-13 PROCEDURE — 88141 CYTOPATH C/V INTERPRET: CPT | Mod: ,,, | Performed by: PATHOLOGY

## 2023-07-13 PROCEDURE — 3078F DIAST BP <80 MM HG: CPT | Mod: CPTII,S$GLB,, | Performed by: OBSTETRICS & GYNECOLOGY

## 2023-07-13 PROCEDURE — 99999 PR PBB SHADOW E&M-EST. PATIENT-LVL III: CPT | Mod: PBBFAC,,, | Performed by: OBSTETRICS & GYNECOLOGY

## 2023-07-13 PROCEDURE — 88141 PR  CYTOPATH CERV/VAG INTERPRET: ICD-10-PCS | Mod: ,,, | Performed by: PATHOLOGY

## 2023-07-13 PROCEDURE — 3008F BODY MASS INDEX DOCD: CPT | Mod: CPTII,S$GLB,, | Performed by: OBSTETRICS & GYNECOLOGY

## 2023-07-13 PROCEDURE — 3078F PR MOST RECENT DIASTOLIC BLOOD PRESSURE < 80 MM HG: ICD-10-PCS | Mod: CPTII,S$GLB,, | Performed by: OBSTETRICS & GYNECOLOGY

## 2023-07-13 RX ORDER — TINIDAZOLE 500 MG/1
2 TABLET ORAL DAILY
Qty: 8 TABLET | Refills: 0 | Status: SHIPPED | OUTPATIENT
Start: 2023-07-13 | End: 2023-07-15

## 2023-07-13 NOTE — PROGRESS NOTES
HPI: Pt is a 38 y.o.  female who presents for routine annual exam. She uses nothing for contraception. She does not want STD screening. Considering 3rd child but wouldn't be until after [place] 2024.    Last pap/HPV 9/21/2020 NORMAL    Recently self treated for yeast infection after she took antibiotics for strep throat.     Would like to get MMG. Does have family hx of breast cancer in paternal GM and her mother has has breast cysts.       ROS:  GENERAL: Feeling well overall. Denies fever or chills.   SKIN: Denies rash or lesions.   HEAD: Denies head injury or headache.   NODES: Denies enlarged lymph nodes.   CHEST: Denies chest pain or shortness of breath.   CARDIOVASCULAR: Denies palpitations or left sided chest pain.   ABDOMEN: No abdominal pain, constipation, diarrhea, nausea or vomiting   URINARY: No dysuria, hematuria, or burning on urination.  REPRODUCTIVE: See HPI.   BREASTS: Denies pain, lumps, or nipple discharge.   HEMATOLOGIC: No easy bruisability or excessive bleeding.   MUSCULOSKELETAL: Denies joint pain or swelling.   NEUROLOGIC: Denies syncope or weakness.   PSYCHIATRIC: Denies acute depression or anxiety     PE:   APPEARANCE: Well nourished, well developed, friendly White female in no acute distress.  NODES: no inguinal lymphadenopathy  BREASTS: Symmetrical, no skin changes or visible lesions. No palpable masses, nipple discharge or adenopathy bilaterally.  ABDOMEN: Soft. No tenderness or masses. No distention.   VULVA: No lesions. Normal external female genitalia.  URETHRAL MEATUS: Normal size and location, no lesions, no prolapse.  URETHRA: No masses, tenderness, or prolapse.  VAGINA: Moist. No lesions or lacerations noted. Milky white discharge with mild odor present.   CERVIX: No lesions or discharge. No cervical motion tenderness.   UTERUS: Normal size, regular shape, mobile, non-tender.  ADNEXA: No tenderness. No fullness or masses palpated in the adnexal regions.   ANUS PERINEUM:  Normal.      Diagnosis:  1. Encounter for gynecological examination    2. Pap smear for cervical cancer screening    3. Encounter for screening for human papillomavirus (HPV)    4. Encounter for screening mammogram for malignant neoplasm of breast    5. Family history of breast cancer        Plan:     Orders Placed This Encounter    HPV High Risk Genotypes, PCR    Mammo Digital Screening Bilat w/ Edilberto    Liquid-Based Pap Smear, Screening    tinidazole (TINDAMAX) 500 MG tablet     - 2 month supply of Lo Loestrin given in case she would like to try that for birth control. Will let me know if she does use it and likes it.     Patient was counseled today on the new ACS guidelines for cervical cytology screening as well as the current recommendations for breast cancer screening. She was counseled to follow up with her PCP for other routine health maintenance.     Follow-up with me in 1 year for routine exam; pap in 3 years.

## 2023-07-19 ENCOUNTER — PATIENT MESSAGE (OUTPATIENT)
Dept: OBSTETRICS AND GYNECOLOGY | Facility: CLINIC | Age: 38
End: 2023-07-19
Payer: COMMERCIAL

## 2023-07-21 LAB
FINAL PATHOLOGIC DIAGNOSIS: NORMAL
Lab: NORMAL

## 2023-08-10 ENCOUNTER — HOSPITAL ENCOUNTER (OUTPATIENT)
Dept: RADIOLOGY | Facility: OTHER | Age: 38
Discharge: HOME | End: 2023-08-10
Attending: OBSTETRICS & GYNECOLOGY
Payer: COMMERCIAL

## 2023-08-10 DIAGNOSIS — Z12.31 ENCOUNTER FOR SCREENING MAMMOGRAM FOR MALIGNANT NEOPLASM OF BREAST: ICD-10-CM

## 2023-08-10 DIAGNOSIS — Z80.3 FAMILY HISTORY OF BREAST CANCER: ICD-10-CM

## 2023-08-10 PROCEDURE — 77063 MAMMO DIGITAL SCREENING BILAT WITH TOMO: ICD-10-PCS | Mod: 26,,, | Performed by: RADIOLOGY

## 2023-08-10 PROCEDURE — 77067 SCR MAMMO BI INCL CAD: CPT | Mod: TC

## 2023-08-10 PROCEDURE — 77067 SCR MAMMO BI INCL CAD: CPT | Mod: 26,,, | Performed by: RADIOLOGY

## 2023-08-10 PROCEDURE — 77063 BREAST TOMOSYNTHESIS BI: CPT | Mod: 26,,, | Performed by: RADIOLOGY

## 2023-08-10 PROCEDURE — 77067 MAMMO DIGITAL SCREENING BILAT WITH TOMO: ICD-10-PCS | Mod: 26,,, | Performed by: RADIOLOGY

## 2023-09-07 ENCOUNTER — PATIENT MESSAGE (OUTPATIENT)
Dept: OBSTETRICS AND GYNECOLOGY | Facility: CLINIC | Age: 38
End: 2023-09-07
Payer: COMMERCIAL

## 2023-09-07 RX ORDER — NORETHINDRONE ACETATE AND ETHINYL ESTRADIOL, ETHINYL ESTRADIOL AND FERROUS FUMARATE 1MG-10(24)
1 KIT ORAL DAILY
Qty: 28 TABLET | Refills: 11 | Status: SHIPPED | OUTPATIENT
Start: 2023-09-07 | End: 2024-09-06

## 2023-09-15 DIAGNOSIS — J06.9 UPPER RESPIRATORY TRACT INFECTION, UNSPECIFIED TYPE: ICD-10-CM

## 2023-09-15 RX ORDER — FLUTICASONE PROPIONATE 50 MCG
SPRAY, SUSPENSION (ML) NASAL
Qty: 32 ML | Refills: 1 | Status: SHIPPED | OUTPATIENT
Start: 2023-09-15 | End: 2023-09-20

## 2023-09-15 NOTE — TELEPHONE ENCOUNTER
Refill Decision Note   Alisa Herrera  is requesting a refill authorization.  Brief Assessment and Rationale for Refill:  Approve     Medication Therapy Plan:         Pharmacist review requested: Yes   Extended chart review required: Yes   Comments:     Note composed:11:14 AM 09/15/2023             Appointments     Last Visit   3/20/2023 Faye Rizzo MD   Next Visit   Visit date not found Faye Rizzo MD

## 2023-09-15 NOTE — TELEPHONE ENCOUNTER
No care due was identified.  Health [facility] Embedded Care Due Messages. Reference number: [id number].   9/15/2023 12:50:09 AM CDT

## 2023-09-15 NOTE — TELEPHONE ENCOUNTER
Refill Routing Note   Medication(s) are not appropriate for processing by [facility] for the following reason(s):      Allergy or intolerance    [facility] action(s):  Defer Care Due:  None identified     Medication Therapy Plan: Allergy/Contraindication: fluticasone propionate    Pharmacist review requested: Yes     Appointments  past 12m or future 3m with PCP    Date Provider   Last Visit   3/20/2023 Faye Rizzo MD   Next Visit   Visit date not found Faye Rizzo MD   ED visits in past 90 days: 0        Note composed:7:58 AM 09/15/2023

## 2023-09-18 ENCOUNTER — PATIENT MESSAGE (OUTPATIENT)
Dept: OBSTETRICS AND GYNECOLOGY | Facility: CLINIC | Age: 38
End: 2023-09-18
Payer: COMMERCIAL

## 2023-09-20 ENCOUNTER — OFFICE VISIT (OUTPATIENT)
Dept: OBSTETRICS AND GYNECOLOGY | Facility: CLINIC | Age: 38
End: 2023-09-20
Payer: COMMERCIAL

## 2023-09-20 VITALS
BODY MASS INDEX: 27.17 KG/M2 | DIASTOLIC BLOOD PRESSURE: 86 MMHG | HEIGHT: 66 IN | SYSTOLIC BLOOD PRESSURE: 124 MMHG | WEIGHT: 169.06 LBS

## 2023-09-20 DIAGNOSIS — N89.8 VAGINAL ITCHING: Primary | ICD-10-CM

## 2023-09-20 DIAGNOSIS — N76.0 ACUTE VAGINITIS: ICD-10-CM

## 2023-09-20 PROCEDURE — 99999 PR PBB SHADOW E&M-EST. PATIENT-LVL III: ICD-10-PCS | Mod: PBBFAC,,,

## 2023-09-20 PROCEDURE — 1160F RVW MEDS BY RX/DR IN RCRD: CPT | Mod: CPTII,S$GLB,, | Performed by: OBSTETRICS & GYNECOLOGY

## 2023-09-20 PROCEDURE — 3079F DIAST BP 80-89 MM HG: CPT | Mod: CPTII,S$GLB,, | Performed by: OBSTETRICS & GYNECOLOGY

## 2023-09-20 PROCEDURE — 87510 GARDNER VAG DNA DIR PROBE: CPT

## 2023-09-20 PROCEDURE — 3008F PR BODY MASS INDEX (BMI) DOCUMENTED: ICD-10-PCS | Mod: CPTII,S$GLB,, | Performed by: OBSTETRICS & GYNECOLOGY

## 2023-09-20 PROCEDURE — 1159F MED LIST DOCD IN RCRD: CPT | Mod: CPTII,S$GLB,, | Performed by: OBSTETRICS & GYNECOLOGY

## 2023-09-20 PROCEDURE — 99212 PR OFFICE/OUTPT VISIT, EST, LEVL II, 10-19 MIN: ICD-10-PCS | Mod: S$GLB,,, | Performed by: OBSTETRICS & GYNECOLOGY

## 2023-09-20 PROCEDURE — 3074F SYST BP LT 130 MM HG: CPT | Mod: CPTII,S$GLB,, | Performed by: OBSTETRICS & GYNECOLOGY

## 2023-09-20 PROCEDURE — 3079F PR MOST RECENT DIASTOLIC BLOOD PRESSURE 80-89 MM HG: ICD-10-PCS | Mod: CPTII,S$GLB,, | Performed by: OBSTETRICS & GYNECOLOGY

## 2023-09-20 PROCEDURE — 87480 CANDIDA DNA DIR PROBE: CPT

## 2023-09-20 PROCEDURE — 3008F BODY MASS INDEX DOCD: CPT | Mod: CPTII,S$GLB,, | Performed by: OBSTETRICS & GYNECOLOGY

## 2023-09-20 PROCEDURE — 1160F PR REVIEW ALL MEDS BY PRESCRIBER/CLIN PHARMACIST DOCUMENTED: ICD-10-PCS | Mod: CPTII,S$GLB,, | Performed by: OBSTETRICS & GYNECOLOGY

## 2023-09-20 PROCEDURE — 1159F PR MEDICATION LIST DOCUMENTED IN MEDICAL RECORD: ICD-10-PCS | Mod: CPTII,S$GLB,, | Performed by: OBSTETRICS & GYNECOLOGY

## 2023-09-20 PROCEDURE — 99999 PR PBB SHADOW E&M-EST. PATIENT-LVL III: CPT | Mod: PBBFAC,,,

## 2023-09-20 PROCEDURE — 99212 OFFICE O/P EST SF 10 MIN: CPT | Mod: S$GLB,,, | Performed by: OBSTETRICS & GYNECOLOGY

## 2023-09-20 PROCEDURE — 3074F PR MOST RECENT SYSTOLIC BLOOD PRESSURE < 130 MM HG: ICD-10-PCS | Mod: CPTII,S$GLB,, | Performed by: OBSTETRICS & GYNECOLOGY

## 2023-09-20 NOTE — PROGRESS NOTES
"CC: vaginal itching    HPI:  Alisa Herrera is a 38 y.o. female  presents to walk in clinic with complaint of vaginal itching since . Some discharge. Denies odor. Recently finishing antibiotics. Has multiple partners, recently had GC testing that was negative.     Past Medical History:   Diagnosis Date    Crohn disease     Missed  2018    S/P  section 2021    Status post dilation and curettage 2018    Ulcerative colitis, unspecified     15 year ago     Past Surgical History:   Procedure Laterality Date    APPENDECTOMY       SECTION N/A 2021    Procedure:  SECTION;  Surgeon: Consuelo Glover MD;  Location: [facility] L&D;  Service: OB/GYN;  Laterality: N/A;    DILATION AND CURETTAGE OF UTERUS  2018    SAB    DILATION AND CURETTAGE OF UTERUS USING SUCTION N/A 2018    Procedure: DILATION AND CURETTAGE, UTERUS, USING SUCTION;  Surgeon: Consuelo Glover MD;  Location: [facility] OR;  Service: OB/GYN;  Laterality: N/A;    EAR TUBE REMOVAL Bilateral     FOOT SURGERY Right     WISDOM TOOTH EXTRACTION       Social History     Tobacco Use    Smoking status: Never    Smokeless tobacco: Never   Substance Use Topics    Alcohol use: Not Currently     Comment: socially    Drug use: No     Family History   Problem Relation Age of Onset    Hypertension Father     Hyperlipidemia Father     Colon cancer Paternal Uncle     Breast cancer Neg Hx     Ovarian cancer Neg Hx      OB History    Para Term  AB Living   4 2 2 0 2 2   SAB IAB Ectopic Multiple Live Births   2 0 0 0 2      # Outcome Date GA Lbr De/2nd Weight Sex Delivery Anes PTL Lv   4 Term 21 39w4d  3.87 kg (8 lb 8.5 oz) F CS-LTranv Spinal N FRIDA   3 2020           2 Term 19 39w6d 07:57 / 01:04 3.6 kg (7 lb 15 oz) F Vag-Spont EPI N FRIDA   1 2018               /86   Ht 5' 6" (1.676 m)   Wt 76.7 kg (169 lb 1.5 oz)   Breastfeeding No   BMI 27.29 kg/m² "     ROS:  GENERAL: No fever, chills, fatigability or weight loss.  VULVAR: No pain, no lesions and no itching.  VAGINAL: No relaxation, no abnormal bleeding and no lesions. Discharge and itching   ABDOMEN: No abdominal pain. Denies nausea. Denies vomiting. No diarrhea. No constipation  BREAST: Denies pain. No lumps. No discharge.  URINARY: No incontinence, no nocturia, no frequency and no dysuria.  CARDIOVASCULAR: No chest pain. No shortness of breath. No leg cramps.  NEUROLOGICAL: No headaches. No vision changes.    PHYSICAL EXAM:  VULVA: normal appearing vulva with no masses, tenderness or lesions   VAGINA: normal appearing vagina with normal color. Thick white discharge, no lesions   CERVIX: normal appearing cervix without discharge or lesions   UTERUS: uterus is normal size, shape, consistency and nontender   ADNEXA: normal adnexa in size, nontender and no masses    ASSESSMENT and PLAN:    ICD-10-CM ICD-9-CM    1. Vaginal itching  N89.8 698.1 Vaginosis Screen by DNA Probe      2. Acute vaginitis  N76.0 616.10 Vaginosis Screen by DNA Probe        - AFFIRM collected  - If yeast, wants Diflucan, if BV- vaginal gel  - Discussed probiotics and boric acid use     Patient was counseled today on vaginitis prevention including :  a. avoiding feminine products such as deoderant soaps, body wash, bubble bath, douches, scented toilet paper, deoderant tampons or pads, feminine wipes, chronic pad use, etc.  b. avoiding other vulvovaginal irritants such as long hot baths, humidity, tight, synthetic clothing, chlorine and sitting around in wet bathing suits  c. wearing cotton underwear, avoiding thong underwear and no underwear to bed  d. taking showers instead of baths and use a hair dryer on cool setting afterwards to dry  e. wearing cotton to exercise and shower immediately after exercise and change clothes  f. using polyurethane condoms without spermicide if sexually active and symptoms are triggered by  intercourse    FOLLOW UP: PRN lack of improvement.    DAYSI [place]

## 2023-09-21 DIAGNOSIS — N76.0 BV (BACTERIAL VAGINOSIS): Primary | ICD-10-CM

## 2023-09-21 DIAGNOSIS — B96.89 BV (BACTERIAL VAGINOSIS): Primary | ICD-10-CM

## 2023-09-21 RX ORDER — METRONIDAZOLE 7.5 MG/G
1 GEL VAGINAL NIGHTLY
Qty: 70 G | Refills: 0 | Status: SHIPPED | OUTPATIENT
Start: 2023-09-21 | End: 2023-09-26

## 2023-09-25 ENCOUNTER — PATIENT MESSAGE (OUTPATIENT)
Dept: FAMILY MEDICINE | Facility: CLINIC | Age: 38
End: 2023-09-25
Payer: COMMERCIAL

## 2023-10-02 ENCOUNTER — PATIENT MESSAGE (OUTPATIENT)
Dept: OBSTETRICS AND GYNECOLOGY | Facility: CLINIC | Age: 38
End: 2023-10-02
Payer: COMMERCIAL

## 2023-10-17 RX ORDER — SERTRALINE HYDROCHLORIDE 50 MG/1
50 TABLET, FILM COATED ORAL DAILY
Qty: 30 TABLET | Refills: 11 | Status: SHIPPED | OUTPATIENT
Start: 2023-10-17 | End: 2024-10-16

## 2024-12-26 NOTE — PROGRESS NOTES
Covid Test Completed Pt/ Tolerated Well   Alert-The patient is alert, awake and responds to voice. The patient is oriented to time, place, and person. The triage nurse is able to obtain subjective information.

## 2025-04-07 ENCOUNTER — TELEPHONE (OUTPATIENT)
Dept: OBSTETRICS AND GYNECOLOGY | Facility: CLINIC | Age: 40
End: 2025-04-07

## 2025-04-07 DIAGNOSIS — N91.2 AMENORRHEA: Primary | ICD-10-CM

## 2025-04-08 ENCOUNTER — LAB VISIT (OUTPATIENT)
Dept: LAB | Facility: HOSPITAL | Age: 40
End: 2025-04-08
Attending: OBSTETRICS & GYNECOLOGY
Payer: COMMERCIAL

## 2025-04-08 DIAGNOSIS — N91.2 AMENORRHEA: ICD-10-CM

## 2025-04-08 LAB
HCG INTACT+B SERPL-ACNC: 778.1 MIU/ML
PROGEST SERPL-MCNC: 14.9 NG/ML

## 2025-04-08 PROCEDURE — 84144 ASSAY OF PROGESTERONE: CPT

## 2025-04-08 PROCEDURE — 36415 COLL VENOUS BLD VENIPUNCTURE: CPT | Mod: PN

## 2025-04-08 PROCEDURE — 84702 CHORIONIC GONADOTROPIN TEST: CPT

## 2025-04-10 ENCOUNTER — CLINICAL SUPPORT (OUTPATIENT)
Dept: OBSTETRICS AND GYNECOLOGY | Facility: CLINIC | Age: 40
End: 2025-04-10
Payer: COMMERCIAL

## 2025-04-10 ENCOUNTER — PATIENT MESSAGE (OUTPATIENT)
Dept: OBSTETRICS AND GYNECOLOGY | Facility: CLINIC | Age: 40
End: 2025-04-10

## 2025-04-10 ENCOUNTER — LAB VISIT (OUTPATIENT)
Dept: LAB | Facility: HOSPITAL | Age: 40
End: 2025-04-10
Attending: OBSTETRICS & GYNECOLOGY
Payer: COMMERCIAL

## 2025-04-10 DIAGNOSIS — N91.2 AMENORRHEA: ICD-10-CM

## 2025-04-10 DIAGNOSIS — N91.2 AMENORRHEA: Primary | ICD-10-CM

## 2025-04-10 PROCEDURE — 84702 CHORIONIC GONADOTROPIN TEST: CPT

## 2025-04-10 PROCEDURE — 99999 PR PBB SHADOW E&M-EST. PATIENT-LVL II: CPT | Mod: PBBFAC,,,

## 2025-04-10 PROCEDURE — 36415 COLL VENOUS BLD VENIPUNCTURE: CPT | Mod: PN

## 2025-04-10 PROCEDURE — 84144 ASSAY OF PROGESTERONE: CPT

## 2025-04-10 NOTE — PROGRESS NOTES
Spoke with patient for approximately 30 minutes during OB navigator virtual visit.  Updated chart to reflect up to date patient demographics.  Allergies, medications, pharmacy, medical, surgical and family history updated. Substance and sexual activity, marital status, gender identity and OB/Gyn history updated.   Patient was guided through expectations of care during pregnancy. Pregnancy confirmation, dating u/s & first OB appts scheduled.  New pregnancy education provided & questions answered. Encouraged to send message or call office with any questions/concerns. Verbalized understanding.     Discussed with pt:    Lmp 3/3; currently 5w3d; MONA 12/8  AMA  Hx SAB x3 per pt  taking PNV daily  denies n/v:   common in 1st tri  denies cramping/has had light spotting x1:   may be normal to have mild cramping/light spotting, lola in 1st tri & with sexual activity  Precautions/warning signs discussed:   encouraged to go to ED for excessive vag d/c, saturating a pad, bright red bleeding, painful cramping worse than menstrual cramps/abd pain that is interrupting daily activity  Should avoid alcohol, smoking, vaping, drugs, herbal supplements  Should check with provider regarding safety of any prescription or OTC meds  Discussed normal breast changes during early pregnancy-breasts may feel full/tender   Discussed breastfeeding benefits/recommendations for mom & baby - plans to breastfeed  Baby: decreased risk of ear infections, allergies, resp infections, SIDS, obesity, diabetes, some cancers  Mom: decreased risk of PP bleeding, breast, ovarian & uterine cancer, type 2 diabetes, depression, osteoporosis; improved heart health  Rec: exlusive breastfeeding for the first 6 months, introduction of complementary foods at 6 months with continued breastfeeding for up to 2 yrs of age or longer as desired by mom/baby (AAP)     InBasket message to pt through pt portal with information regarding the [email or web address] for access  to the Pregnancy A to Z guide and Prenatal Class schedule. Informed of ConnectedMOM program & encouraged to participate, Get Ready to Meet Your Baby pamphlet, Coffective ramonita and how to obtain a breast pump through insurance toward end of pregnancy

## 2025-04-11 ENCOUNTER — RESULTS FOLLOW-UP (OUTPATIENT)
Dept: OBSTETRICS AND GYNECOLOGY | Facility: CLINIC | Age: 40
End: 2025-04-11

## 2025-04-11 LAB
HCG INTACT+B SERPL-ACNC: 2240.6 MIU/ML
PROGEST SERPL-MCNC: 18.7 NG/ML

## 2025-04-21 ENCOUNTER — RESULTS FOLLOW-UP (OUTPATIENT)
Dept: OBSTETRICS AND GYNECOLOGY | Facility: CLINIC | Age: 40
End: 2025-04-21

## 2025-04-25 ENCOUNTER — HOSPITAL ENCOUNTER (OUTPATIENT)
Dept: PERINATAL CARE | Facility: OTHER | Age: 40
Discharge: HOME | End: 2025-04-25
Attending: OBSTETRICS & GYNECOLOGY
Payer: COMMERCIAL

## 2025-04-25 ENCOUNTER — OFFICE VISIT (OUTPATIENT)
Dept: OBSTETRICS AND GYNECOLOGY | Facility: CLINIC | Age: 40
End: 2025-04-25
Payer: COMMERCIAL

## 2025-04-25 VITALS — BODY MASS INDEX: 26.79 KG/M2 | DIASTOLIC BLOOD PRESSURE: 80 MMHG | SYSTOLIC BLOOD PRESSURE: 119 MMHG | WEIGHT: 166 LBS

## 2025-04-25 DIAGNOSIS — N91.1 SECONDARY AMENORRHEA: Primary | ICD-10-CM

## 2025-04-25 DIAGNOSIS — N91.2 AMENORRHEA: ICD-10-CM

## 2025-04-25 DIAGNOSIS — O20.9 BLEEDING IN EARLY PREGNANCY: ICD-10-CM

## 2025-04-25 DIAGNOSIS — Z3A.01 7 WEEKS GESTATION OF PREGNANCY: ICD-10-CM

## 2025-04-25 DIAGNOSIS — Z11.3 ENCNTR SCREEN FOR INFECTIONS W SEXL MODE OF TRANSMISS: ICD-10-CM

## 2025-04-25 PROCEDURE — 76801 OB US < 14 WKS SINGLE FETUS: CPT

## 2025-04-25 PROCEDURE — 76801 OB US < 14 WKS SINGLE FETUS: CPT | Mod: 26,,, | Performed by: STUDENT IN AN ORGANIZED HEALTH CARE EDUCATION/TRAINING PROGRAM

## 2025-04-25 PROCEDURE — 99999 PR PBB SHADOW E&M-EST. PATIENT-LVL III: CPT | Mod: PBBFAC,,, | Performed by: NURSE PRACTITIONER

## 2025-04-25 PROCEDURE — 87086 URINE CULTURE/COLONY COUNT: CPT | Performed by: NURSE PRACTITIONER

## 2025-04-25 RX ORDER — GLUCOSAM/CHONDRO/HERB 149/HYAL 750-100 MG
TABLET ORAL
COMMUNITY

## 2025-04-25 RX ORDER — LACTOBACILLUS COMBINATION NO.4 3B CELL
CAPSULE ORAL
COMMUNITY

## 2025-04-25 RX ORDER — MECOBALAMIN 5000 MCG
TABLET,DISINTEGRATING ORAL
COMMUNITY

## 2025-04-25 RX ORDER — ACETAMINOPHEN 500 MG
30 TABLET ORAL
COMMUNITY

## 2025-04-25 NOTE — PROGRESS NOTES
"39 y.o. female  at Unknown   Doing well. Pt reports moderate bleeding that started Thursday afternoon; denies cramping. Hx of 3 miscarriages, so pt called office and had beta hcg levels done over a few days- increased appropriately. Not feeling flutters/FM yet, denies leaking of fluid or cramping.     Reviewed medical history- hx of migraines, Crohn's dx and UC & previous pregnancy OB history- 3 early miscarriages (third was a chemical preg), 1 vag delivery, 1 c/s delivery. LMP 25. U/S not released yet- pt was told GA 7w 1d and subchronic hemorrhage noted, cardiac activity present.     BP: 119/80, Ht: 5'6", Wt: 75.3 kg    ROS:  GENERAL: No weight changes. No swelling. No fatigue. No fever.   CARDIOVASCULAR: No chest pain. No shortness of breath. No leg cramps.   NEUROLOGICAL: No headaches. No vision changes.  BREASTS: No pain. No lumps. No discharge.  ABDOMEN: No pain. No diarrhea. No constipation.  REPRODUCTIVE: Bleeding in early pregnancy  VULVA: No pain. No lesions. No itching.  VAGINA: No relaxation. No itching. No odor. No discharge. No lesions.  URINARY: No incontinence. No nocturia. No frequency. No dysuria.    PE:  APPEARANCE: Well nourished, well developed, obese, in no acute distress.  AFFECT: WNL, alert and oriented x 3.  SKIN: No acne or hirsutism.  NODES: No inguinal, cervical, axillary or femoral lymph node enlargement.  CHEST: Good respiratory effort.   PELVIC: External female genitalia without lesions.  Female hair distribution. Adequate perineal body, Normal urethral meatus. Vagina moist and well rugated without lesions or discharge.  No significant cystocele or rectocele present. Cervix closed. Adnexa without masses or tenderness.  EXTREMITIES: No edema    Plan:  Prenatal labs ordered today. Last Pap smear performed 2023- NILM/HPV negative and GC/CT cultures collected today. Urine sent for culture. Cervix closed.   Discussed starting prenatal vitamins with folic acid now and low " dose aspirin for pre-e prevention starting 12-16 wks. Discussed genetic testing options: NIPT- [id number] after 10 weeks (pt desires). Pt desires to opt in for connected mom to monitor BPs at home (needs order at [facility]). Questions answered. Pt verbalized understanding.    1st TRIMESTER COUNSELING:   Common complaints of pregnancy  HIV and other routine prenatal tests including  genetic screening  Risk factors identified by prenatal history  Sexual activity and exercise  Tobacco, alcohol and drug use  Use of any medications (Including supplements, Vitamins, Herbs, or OTC Drugs)     TERATOLOGY COUNSELING:   Discussed indications and options for aneuploidy screening    -  Pt desires MT21 (to be ordered at [facility] appt)    Discussed precautions- vaginal bleeding/cramping and how/when to call the office or JA department. RTC  with Dr. Glover for [facility] appt, call or present sooner prn.     Time spent over 30 minutes   This includes face to face time and non-face to face time preparing to see the patient (eg, review of tests), obtaining and/or reviewing separately obtained history, documenting clinical information in the electronic or other health record, independently interpreting results and communicating results to the patient/family/caregiver, or care coordinator.     Kendrick Rich, DNP, RN, APRN, WHNP-BC [facility]

## 2025-04-28 ENCOUNTER — RESULTS FOLLOW-UP (OUTPATIENT)
Dept: OBSTETRICS AND GYNECOLOGY | Facility: CLINIC | Age: 40
End: 2025-04-28

## 2025-04-29 ENCOUNTER — TELEPHONE (OUTPATIENT)
Dept: OBSTETRICS AND GYNECOLOGY | Facility: CLINIC | Age: 40
End: 2025-04-29
Payer: COMMERCIAL

## 2025-04-29 DIAGNOSIS — Z3A.11 11 WEEKS GESTATION OF PREGNANCY: ICD-10-CM

## 2025-04-29 DIAGNOSIS — Z3A.12 12 WEEKS GESTATION OF PREGNANCY: ICD-10-CM

## 2025-04-29 DIAGNOSIS — O20.9 BLEEDING IN EARLY PREGNANCY: Primary | ICD-10-CM

## 2025-05-21 ENCOUNTER — PATIENT MESSAGE (OUTPATIENT)
Dept: OBSTETRICS AND GYNECOLOGY | Facility: CLINIC | Age: 40
End: 2025-05-21
Payer: COMMERCIAL

## 2025-06-03 ENCOUNTER — ROUTINE PRENATAL (OUTPATIENT)
Dept: OBSTETRICS AND GYNECOLOGY | Facility: CLINIC | Age: 40
End: 2025-06-03
Payer: COMMERCIAL

## 2025-06-03 ENCOUNTER — PATIENT MESSAGE (OUTPATIENT)
Dept: OBSTETRICS AND GYNECOLOGY | Facility: CLINIC | Age: 40
End: 2025-06-03

## 2025-06-03 VITALS
DIASTOLIC BLOOD PRESSURE: 74 MMHG | BODY MASS INDEX: 27.75 KG/M2 | SYSTOLIC BLOOD PRESSURE: 122 MMHG | WEIGHT: 171.94 LBS

## 2025-06-03 DIAGNOSIS — Z34.80 SUPERVISION OF OTHER NORMAL PREGNANCY, ANTEPARTUM: Primary | ICD-10-CM

## 2025-06-03 PROCEDURE — 99999 PR PBB SHADOW E&M-EST. PATIENT-LVL III: CPT | Mod: PBBFAC,,, | Performed by: OBSTETRICS & GYNECOLOGY

## 2025-06-03 PROCEDURE — 0500F INITIAL PRENATAL CARE VISIT: CPT | Mod: CPTII,S$GLB,, | Performed by: OBSTETRICS & GYNECOLOGY

## 2025-06-05 ENCOUNTER — RESULTS FOLLOW-UP (OUTPATIENT)
Dept: OBSTETRICS AND GYNECOLOGY | Facility: CLINIC | Age: 40
End: 2025-06-05

## 2025-06-06 ENCOUNTER — PATIENT MESSAGE (OUTPATIENT)
Dept: OBSTETRICS AND GYNECOLOGY | Facility: CLINIC | Age: 40
End: 2025-06-06
Payer: COMMERCIAL

## 2025-06-26 ENCOUNTER — PATIENT MESSAGE (OUTPATIENT)
Dept: OTHER | Facility: OTHER | Age: 40
End: 2025-06-26
Payer: COMMERCIAL

## 2025-07-01 ENCOUNTER — ROUTINE PRENATAL (OUTPATIENT)
Dept: OBSTETRICS AND GYNECOLOGY | Facility: CLINIC | Age: 40
End: 2025-07-01
Payer: COMMERCIAL

## 2025-07-01 VITALS
BODY MASS INDEX: 28.29 KG/M2 | DIASTOLIC BLOOD PRESSURE: 76 MMHG | WEIGHT: 175.25 LBS | SYSTOLIC BLOOD PRESSURE: 116 MMHG

## 2025-07-01 DIAGNOSIS — Z34.80 SUPERVISION OF OTHER NORMAL PREGNANCY, ANTEPARTUM: Primary | ICD-10-CM

## 2025-07-01 PROCEDURE — 0502F SUBSEQUENT PRENATAL CARE: CPT | Mod: CPTII,S$GLB,, | Performed by: OBSTETRICS & GYNECOLOGY

## 2025-07-01 PROCEDURE — 99999 PR PBB SHADOW E&M-EST. PATIENT-LVL III: CPT | Mod: PBBFAC,,, | Performed by: OBSTETRICS & GYNECOLOGY

## 2025-07-01 NOTE — PROGRESS NOTES
Doing well. No complaints. Anatomy scheduled. Rx breast pump given.     DM screen, CBC next visit.     RTC 8 weeks.

## 2025-07-02 ENCOUNTER — TELEPHONE (OUTPATIENT)
Dept: OBSTETRICS AND GYNECOLOGY | Facility: CLINIC | Age: 40
End: 2025-07-02
Payer: COMMERCIAL

## 2025-07-02 NOTE — TELEPHONE ENCOUNTER
----- Message from Melanie Purdyn sent at 7/2/2025  3:08 PM CDT -----  Dentist office need clearance for this patient to get a fillings. Erin will be the  to fax this information over to the dentist office In regards to this patient.      Dentist Office Contact#   [phone]    Fax#   [phone]                                                                        Thank You!!!!

## 2025-07-03 ENCOUNTER — PATIENT MESSAGE (OUTPATIENT)
Dept: OTHER | Facility: OTHER | Age: 40
End: 2025-07-03
Payer: COMMERCIAL

## 2025-07-21 ENCOUNTER — PROCEDURE VISIT (OUTPATIENT)
Dept: MATERNAL FETAL MEDICINE | Facility: CLINIC | Age: 40
End: 2025-07-21
Payer: COMMERCIAL

## 2025-07-21 DIAGNOSIS — Z36.89 ENCOUNTER FOR ULTRASOUND TO ASSESS FETAL GROWTH: Primary | ICD-10-CM

## 2025-07-21 DIAGNOSIS — Z34.80 SUPERVISION OF OTHER NORMAL PREGNANCY, ANTEPARTUM: ICD-10-CM

## 2025-07-21 PROCEDURE — 76811 OB US DETAILED SNGL FETUS: CPT | Mod: S$GLB,,, | Performed by: OBSTETRICS & GYNECOLOGY

## 2025-07-24 ENCOUNTER — PATIENT MESSAGE (OUTPATIENT)
Dept: OTHER | Facility: OTHER | Age: 40
End: 2025-07-24
Payer: COMMERCIAL

## 2025-08-21 ENCOUNTER — PATIENT MESSAGE (OUTPATIENT)
Dept: OTHER | Facility: OTHER | Age: 40
End: 2025-08-21
Payer: COMMERCIAL

## 2025-08-26 ENCOUNTER — ROUTINE PRENATAL (OUTPATIENT)
Dept: OBSTETRICS AND GYNECOLOGY | Facility: CLINIC | Age: 40
End: 2025-08-26
Payer: COMMERCIAL

## 2025-08-26 ENCOUNTER — LAB VISIT (OUTPATIENT)
Dept: LAB | Facility: OTHER | Age: 40
End: 2025-08-26
Attending: OBSTETRICS & GYNECOLOGY
Payer: COMMERCIAL

## 2025-08-26 VITALS
SYSTOLIC BLOOD PRESSURE: 113 MMHG | WEIGHT: 188.06 LBS | DIASTOLIC BLOOD PRESSURE: 71 MMHG | BODY MASS INDEX: 30.35 KG/M2

## 2025-08-26 DIAGNOSIS — Z34.80 SUPERVISION OF OTHER NORMAL PREGNANCY, ANTEPARTUM: Primary | ICD-10-CM

## 2025-08-26 PROCEDURE — 99999 PR PBB SHADOW E&M-EST. PATIENT-LVL III: CPT | Mod: PBBFAC,,, | Performed by: ADVANCED PRACTICE MIDWIFE

## 2025-08-26 PROCEDURE — 0502F SUBSEQUENT PRENATAL CARE: CPT | Mod: CPTII,S$GLB,, | Performed by: ADVANCED PRACTICE MIDWIFE

## 2025-08-28 ENCOUNTER — PATIENT MESSAGE (OUTPATIENT)
Dept: OBSTETRICS AND GYNECOLOGY | Facility: CLINIC | Age: 40
End: 2025-08-28
Payer: COMMERCIAL

## (undated) DEVICE — SOL IRR NACL .9% 3000ML

## (undated) DEVICE — Device

## (undated) DEVICE — SOL 9P NACL IRR PIC IL

## (undated) DEVICE — VACURETTE 9MM CURVED

## (undated) DEVICE — GLOVE BIOGEL SKINSENSE PI 6.5

## (undated) DEVICE — UNDERGLOVE BIOGEL PI SZ 6.5 LF